# Patient Record
Sex: MALE | Race: WHITE | NOT HISPANIC OR LATINO | URBAN - METROPOLITAN AREA
[De-identification: names, ages, dates, MRNs, and addresses within clinical notes are randomized per-mention and may not be internally consistent; named-entity substitution may affect disease eponyms.]

---

## 2019-07-03 ENCOUNTER — EMERGENCY (EMERGENCY)
Facility: HOSPITAL | Age: 74
LOS: 1 days | Discharge: HOME | End: 2019-07-03
Attending: STUDENT IN AN ORGANIZED HEALTH CARE EDUCATION/TRAINING PROGRAM
Payer: MEDICARE

## 2019-07-03 VITALS
RESPIRATION RATE: 17 BRPM | TEMPERATURE: 98 F | WEIGHT: 195.99 LBS | DIASTOLIC BLOOD PRESSURE: 95 MMHG | OXYGEN SATURATION: 99 % | SYSTOLIC BLOOD PRESSURE: 173 MMHG | HEIGHT: 64 IN | HEART RATE: 51 BPM

## 2019-07-03 DIAGNOSIS — Z98.890 OTHER SPECIFIED POSTPROCEDURAL STATES: Chronic | ICD-10-CM

## 2019-07-03 LAB
ALBUMIN SERPL ELPH-MCNC: 4.8 G/DL — SIGNIFICANT CHANGE UP (ref 3.3–5)
ALP SERPL-CCNC: 74 U/L — SIGNIFICANT CHANGE UP (ref 40–120)
ALT FLD-CCNC: 12 U/L — SIGNIFICANT CHANGE UP (ref 10–45)
ANION GAP SERPL CALC-SCNC: 12 MMOL/L — SIGNIFICANT CHANGE UP (ref 5–17)
ANION GAP SERPL CALC-SCNC: 16 MMOL/L — SIGNIFICANT CHANGE UP (ref 5–17)
APPEARANCE UR: CLEAR — SIGNIFICANT CHANGE UP
AST SERPL-CCNC: 17 U/L — SIGNIFICANT CHANGE UP (ref 10–40)
BACTERIA # UR AUTO: NEGATIVE — SIGNIFICANT CHANGE UP
BASE EXCESS BLDV CALC-SCNC: -0.6 MMOL/L — SIGNIFICANT CHANGE UP (ref -2–2)
BASOPHILS # BLD AUTO: 0 K/UL — SIGNIFICANT CHANGE UP (ref 0–0.2)
BASOPHILS NFR BLD AUTO: 0.3 % — SIGNIFICANT CHANGE UP (ref 0–2)
BILIRUB SERPL-MCNC: 0.4 MG/DL — SIGNIFICANT CHANGE UP (ref 0.2–1.2)
BILIRUB UR-MCNC: NEGATIVE — SIGNIFICANT CHANGE UP
BUN SERPL-MCNC: 23 MG/DL — SIGNIFICANT CHANGE UP (ref 7–23)
BUN SERPL-MCNC: 26 MG/DL — HIGH (ref 7–23)
CA-I SERPL-SCNC: 1.2 MMOL/L — SIGNIFICANT CHANGE UP (ref 1.12–1.3)
CALCIUM SERPL-MCNC: 11 MG/DL — HIGH (ref 8.4–10.5)
CALCIUM SERPL-MCNC: 9.2 MG/DL — SIGNIFICANT CHANGE UP (ref 8.4–10.5)
CHLORIDE BLDV-SCNC: 101 MMOL/L — SIGNIFICANT CHANGE UP (ref 96–108)
CHLORIDE SERPL-SCNC: 94 MMOL/L — LOW (ref 96–108)
CHLORIDE SERPL-SCNC: 99 MMOL/L — SIGNIFICANT CHANGE UP (ref 96–108)
CO2 BLDV-SCNC: 26 MMOL/L — SIGNIFICANT CHANGE UP (ref 22–30)
CO2 SERPL-SCNC: 22 MMOL/L — SIGNIFICANT CHANGE UP (ref 22–31)
CO2 SERPL-SCNC: 26 MMOL/L — SIGNIFICANT CHANGE UP (ref 22–31)
COLOR SPEC: SIGNIFICANT CHANGE UP
CREAT SERPL-MCNC: 1.27 MG/DL — SIGNIFICANT CHANGE UP (ref 0.5–1.3)
CREAT SERPL-MCNC: 1.33 MG/DL — HIGH (ref 0.5–1.3)
DIFF PNL FLD: ABNORMAL
EOSINOPHIL # BLD AUTO: 0 K/UL — SIGNIFICANT CHANGE UP (ref 0–0.5)
EOSINOPHIL NFR BLD AUTO: 0.3 % — SIGNIFICANT CHANGE UP (ref 0–6)
EPI CELLS # UR: 0 /HPF — SIGNIFICANT CHANGE UP
GAS PNL BLDV: 132 MMOL/L — LOW (ref 135–145)
GAS PNL BLDV: SIGNIFICANT CHANGE UP
GLUCOSE BLDC GLUCOMTR-MCNC: 183 MG/DL — HIGH (ref 70–99)
GLUCOSE BLDV-MCNC: 257 MG/DL — HIGH (ref 70–99)
GLUCOSE SERPL-MCNC: 270 MG/DL — HIGH (ref 70–99)
GLUCOSE SERPL-MCNC: 429 MG/DL — HIGH (ref 70–99)
GLUCOSE UR QL: ABNORMAL
HCO3 BLDV-SCNC: 25 MMOL/L — SIGNIFICANT CHANGE UP (ref 21–29)
HCT VFR BLD CALC: 49.6 % — SIGNIFICANT CHANGE UP (ref 39–50)
HCT VFR BLDA CALC: 46 % — SIGNIFICANT CHANGE UP (ref 39–50)
HGB BLD CALC-MCNC: 14.9 G/DL — SIGNIFICANT CHANGE UP (ref 13–17)
HGB BLD-MCNC: 15.1 G/DL — SIGNIFICANT CHANGE UP (ref 13–17)
HYALINE CASTS # UR AUTO: 0 /LPF — SIGNIFICANT CHANGE UP (ref 0–2)
KETONES UR-MCNC: NEGATIVE — SIGNIFICANT CHANGE UP
LACTATE BLDV-MCNC: 2.4 MMOL/L — HIGH (ref 0.7–2)
LEUKOCYTE ESTERASE UR-ACNC: NEGATIVE — SIGNIFICANT CHANGE UP
LIDOCAIN IGE QN: 80 U/L — HIGH (ref 7–60)
LYMPHOCYTES # BLD AUTO: 0.9 K/UL — LOW (ref 1–3.3)
LYMPHOCYTES # BLD AUTO: 9.1 % — LOW (ref 13–44)
MCHC RBC-ENTMCNC: 26.2 PG — LOW (ref 27–34)
MCHC RBC-ENTMCNC: 30.4 GM/DL — LOW (ref 32–36)
MCV RBC AUTO: 86.2 FL — SIGNIFICANT CHANGE UP (ref 80–100)
MONOCYTES # BLD AUTO: 0.5 K/UL — SIGNIFICANT CHANGE UP (ref 0–0.9)
MONOCYTES NFR BLD AUTO: 4.9 % — SIGNIFICANT CHANGE UP (ref 2–14)
NEUTROPHILS # BLD AUTO: 8.6 K/UL — HIGH (ref 1.8–7.4)
NEUTROPHILS NFR BLD AUTO: 85.4 % — HIGH (ref 43–77)
NITRITE UR-MCNC: NEGATIVE — SIGNIFICANT CHANGE UP
PCO2 BLDV: 46 MMHG — SIGNIFICANT CHANGE UP (ref 35–50)
PH BLDV: 7.35 — SIGNIFICANT CHANGE UP (ref 7.35–7.45)
PH UR: 6.5 — SIGNIFICANT CHANGE UP (ref 5–8)
PLATELET # BLD AUTO: 210 K/UL — SIGNIFICANT CHANGE UP (ref 150–400)
PO2 BLDV: 29 MMHG — SIGNIFICANT CHANGE UP (ref 25–45)
POTASSIUM BLDV-SCNC: 5.2 MMOL/L — SIGNIFICANT CHANGE UP (ref 3.5–5.3)
POTASSIUM SERPL-MCNC: 5.1 MMOL/L — SIGNIFICANT CHANGE UP (ref 3.5–5.3)
POTASSIUM SERPL-MCNC: 5.6 MMOL/L — HIGH (ref 3.5–5.3)
POTASSIUM SERPL-SCNC: 5.1 MMOL/L — SIGNIFICANT CHANGE UP (ref 3.5–5.3)
POTASSIUM SERPL-SCNC: 5.6 MMOL/L — HIGH (ref 3.5–5.3)
PROT SERPL-MCNC: 8.1 G/DL — SIGNIFICANT CHANGE UP (ref 6–8.3)
PROT UR-MCNC: ABNORMAL
RBC # BLD: 5.76 M/UL — SIGNIFICANT CHANGE UP (ref 4.2–5.8)
RBC # FLD: 12.3 % — SIGNIFICANT CHANGE UP (ref 10.3–14.5)
RBC CASTS # UR COMP ASSIST: 1 /HPF — SIGNIFICANT CHANGE UP (ref 0–4)
SAO2 % BLDV: 50 % — LOW (ref 67–88)
SODIUM SERPL-SCNC: 133 MMOL/L — LOW (ref 135–145)
SODIUM SERPL-SCNC: 136 MMOL/L — SIGNIFICANT CHANGE UP (ref 135–145)
SP GR SPEC: 1.02 — SIGNIFICANT CHANGE UP (ref 1.01–1.02)
UROBILINOGEN FLD QL: NEGATIVE — SIGNIFICANT CHANGE UP
WBC # BLD: 10.1 K/UL — SIGNIFICANT CHANGE UP (ref 3.8–10.5)
WBC # FLD AUTO: 10.1 K/UL — SIGNIFICANT CHANGE UP (ref 3.8–10.5)
WBC UR QL: 0 /HPF — SIGNIFICANT CHANGE UP (ref 0–5)

## 2019-07-03 PROCEDURE — 74177 CT ABD & PELVIS W/CONTRAST: CPT | Mod: 26

## 2019-07-03 PROCEDURE — 99285 EMERGENCY DEPT VISIT HI MDM: CPT | Mod: GC

## 2019-07-03 PROCEDURE — 99220: CPT

## 2019-07-03 RX ORDER — ACETAMINOPHEN 500 MG
975 TABLET ORAL ONCE
Refills: 0 | Status: COMPLETED | OUTPATIENT
Start: 2019-07-03 | End: 2019-07-03

## 2019-07-03 RX ORDER — INSULIN GLARGINE 100 [IU]/ML
16 INJECTION, SOLUTION SUBCUTANEOUS AT BEDTIME
Refills: 0 | Status: DISCONTINUED | OUTPATIENT
Start: 2019-07-03 | End: 2019-07-07

## 2019-07-03 RX ORDER — SODIUM CHLORIDE 9 MG/ML
1000 INJECTION INTRAMUSCULAR; INTRAVENOUS; SUBCUTANEOUS
Refills: 0 | Status: DISCONTINUED | OUTPATIENT
Start: 2019-07-03 | End: 2019-07-07

## 2019-07-03 RX ORDER — SODIUM CHLORIDE 9 MG/ML
1000 INJECTION INTRAMUSCULAR; INTRAVENOUS; SUBCUTANEOUS ONCE
Refills: 0 | Status: COMPLETED | OUTPATIENT
Start: 2019-07-03 | End: 2019-07-03

## 2019-07-03 RX ORDER — ONDANSETRON 8 MG/1
4 TABLET, FILM COATED ORAL ONCE
Refills: 0 | Status: COMPLETED | OUTPATIENT
Start: 2019-07-03 | End: 2019-07-03

## 2019-07-03 RX ORDER — DEXTROSE 50 % IN WATER 50 %
25 SYRINGE (ML) INTRAVENOUS ONCE
Refills: 0 | Status: DISCONTINUED | OUTPATIENT
Start: 2019-07-03 | End: 2019-07-07

## 2019-07-03 RX ORDER — INSULIN LISPRO 100/ML
5 VIAL (ML) SUBCUTANEOUS
Refills: 0 | Status: DISCONTINUED | OUTPATIENT
Start: 2019-07-03 | End: 2019-07-07

## 2019-07-03 RX ORDER — ONDANSETRON 8 MG/1
4 TABLET, FILM COATED ORAL EVERY 6 HOURS
Refills: 0 | Status: DISCONTINUED | OUTPATIENT
Start: 2019-07-03 | End: 2019-07-07

## 2019-07-03 RX ORDER — INSULIN LISPRO 100/ML
VIAL (ML) SUBCUTANEOUS
Refills: 0 | Status: DISCONTINUED | OUTPATIENT
Start: 2019-07-03 | End: 2019-07-07

## 2019-07-03 RX ORDER — SODIUM CHLORIDE 9 MG/ML
1000 INJECTION, SOLUTION INTRAVENOUS
Refills: 0 | Status: DISCONTINUED | OUTPATIENT
Start: 2019-07-03 | End: 2019-07-07

## 2019-07-03 RX ORDER — GLUCAGON INJECTION, SOLUTION 0.5 MG/.1ML
1 INJECTION, SOLUTION SUBCUTANEOUS ONCE
Refills: 0 | Status: DISCONTINUED | OUTPATIENT
Start: 2019-07-03 | End: 2019-07-07

## 2019-07-03 RX ORDER — DEXTROSE 50 % IN WATER 50 %
12.5 SYRINGE (ML) INTRAVENOUS ONCE
Refills: 0 | Status: DISCONTINUED | OUTPATIENT
Start: 2019-07-03 | End: 2019-07-07

## 2019-07-03 RX ORDER — DEXTROSE 50 % IN WATER 50 %
15 SYRINGE (ML) INTRAVENOUS ONCE
Refills: 0 | Status: DISCONTINUED | OUTPATIENT
Start: 2019-07-03 | End: 2019-07-07

## 2019-07-03 RX ORDER — FENOFIBRATE,MICRONIZED 130 MG
145 CAPSULE ORAL DAILY
Refills: 0 | Status: DISCONTINUED | OUTPATIENT
Start: 2019-07-03 | End: 2019-07-07

## 2019-07-03 RX ORDER — ATORVASTATIN CALCIUM 80 MG/1
80 TABLET, FILM COATED ORAL AT BEDTIME
Refills: 0 | Status: DISCONTINUED | OUTPATIENT
Start: 2019-07-03 | End: 2019-07-07

## 2019-07-03 RX ORDER — INSULIN LISPRO 100/ML
VIAL (ML) SUBCUTANEOUS AT BEDTIME
Refills: 0 | Status: DISCONTINUED | OUTPATIENT
Start: 2019-07-03 | End: 2019-07-07

## 2019-07-03 RX ORDER — LISINOPRIL 2.5 MG/1
20 TABLET ORAL DAILY
Refills: 0 | Status: DISCONTINUED | OUTPATIENT
Start: 2019-07-03 | End: 2019-07-07

## 2019-07-03 RX ORDER — TAMSULOSIN HYDROCHLORIDE 0.4 MG/1
0.4 CAPSULE ORAL DAILY
Refills: 0 | Status: DISCONTINUED | OUTPATIENT
Start: 2019-07-03 | End: 2019-07-07

## 2019-07-03 RX ORDER — FAMOTIDINE 10 MG/ML
20 INJECTION INTRAVENOUS ONCE
Refills: 0 | Status: COMPLETED | OUTPATIENT
Start: 2019-07-03 | End: 2019-07-03

## 2019-07-03 RX ADMIN — Medication 2: at 18:16

## 2019-07-03 RX ADMIN — SODIUM CHLORIDE 1000 MILLILITER(S): 9 INJECTION INTRAMUSCULAR; INTRAVENOUS; SUBCUTANEOUS at 13:37

## 2019-07-03 RX ADMIN — LISINOPRIL 20 MILLIGRAM(S): 2.5 TABLET ORAL at 18:19

## 2019-07-03 RX ADMIN — ATORVASTATIN CALCIUM 80 MILLIGRAM(S): 80 TABLET, FILM COATED ORAL at 22:11

## 2019-07-03 RX ADMIN — ONDANSETRON 4 MILLIGRAM(S): 8 TABLET, FILM COATED ORAL at 10:56

## 2019-07-03 RX ADMIN — INSULIN GLARGINE 16 UNIT(S): 100 INJECTION, SOLUTION SUBCUTANEOUS at 22:10

## 2019-07-03 RX ADMIN — SODIUM CHLORIDE 100 MILLILITER(S): 9 INJECTION INTRAMUSCULAR; INTRAVENOUS; SUBCUTANEOUS at 17:22

## 2019-07-03 RX ADMIN — Medication 975 MILLIGRAM(S): at 10:56

## 2019-07-03 RX ADMIN — FAMOTIDINE 20 MILLIGRAM(S): 10 INJECTION INTRAVENOUS at 10:56

## 2019-07-03 RX ADMIN — Medication 5 UNIT(S): at 18:16

## 2019-07-03 RX ADMIN — TAMSULOSIN HYDROCHLORIDE 0.4 MILLIGRAM(S): 0.4 CAPSULE ORAL at 17:21

## 2019-07-03 RX ADMIN — SODIUM CHLORIDE 1000 MILLILITER(S): 9 INJECTION INTRAMUSCULAR; INTRAVENOUS; SUBCUTANEOUS at 10:55

## 2019-07-03 NOTE — ED CDU PROVIDER INITIAL DAY NOTE - OBJECTIVE STATEMENT
75yo M with PMH CAD s/p CABG, pancreatitis x 2 (last 2 months ago), HTN, HLD, DM (on metformin), PSH hernia repair with mesh, presenting with nausea and sensation that he has to have a BM since this morning. Pt reports he felt well yesterday, woke up at 2AM and had large BM, normal color but stool was harder than usual. Since then has felt like he has to have another BM but can not. pt also Reports had mid lower back pain, felt he couldn't get into a comfortable position on the sofa. No abdominal pain. + dysuria noted today. Did not eat or drink today. pt does not check glucose regularly. right now feels well.    Denies any fever/chills, CP, SOB, vomiting, melena, BRBPR, hematuria, numbness/tingling, weakness.

## 2019-07-03 NOTE — ED PROVIDER NOTE - ATTENDING CONTRIBUTION TO CARE
74 YOM with PMH CAD s/p CABG, pancreatitis x 2 (last 2 months ago), HTN, HLD, DM (on metformin), PSH hernia repair with mesh, presenting with sensation that he has to have a BM and some lower back pain since this morning. Woke up at 2AM and had large BM, normal color but stool was harder than usual. Since then has felt like he has to have another BM but can not. No abdominal pain. + dysuria noted today. Denies any fever/chills, CP, SOB, vomiting, melena, BRBPR, hematuria, numbness/tingling, weakness.    PE: HD stable, well appearing, s1s2 normal, lungs clear, abd soft obese ntnd, no midline spinal tenderness, no CVAT, no obvious groin hernia, no LE swelling.    AP: evaluate for obstruction given multiple surgeries, evaluate for intraabdominal pathology. lower concern for 74 YOM with PMH CAD s/p CABG, pancreatitis x 2 (last 2 months ago), HTN, HLD, DM (on metformin), PSH hernia repair with mesh, presenting with sensation that he has to have a BM and some lower back pain since this morning. Woke up at 2AM and had large BM, normal color but stool was harder than usual. Since then has felt like he has to have another BM but can not. No abdominal pain. + dysuria noted today. Denies any fever/chills, CP, SOB, vomiting, melena, BRBPR, hematuria, numbness/tingling, weakness.    PE: HD stable, well appearing, s1s2 normal, lungs clear, abd soft obese ntnd, no midline spinal tenderness, no CVAT, no obvious groin hernia, no LE swelling.    AP: evaluate for obstruction given multiple surgeries and renal colic, evaluate for intraabdominal pathology. fsg elevated, will r/o DKA. fluids to hydrate. dispo pending

## 2019-07-03 NOTE — ED PROVIDER NOTE - RECTAL
non-tender/chaperoned by ED suzanne Driscoll; + nontender hemorrhoids, minimal brown stool in rectum

## 2019-07-03 NOTE — ED CDU PROVIDER INITIAL DAY NOTE - ATTENDING CONTRIBUTION TO CARE
75yo M with PMH CAD s/p CABG, pancreatitis x 2 (last 2 months ago), HTN, HLD, DM (on metformin), PSH hernia repair with mesh, presenting with mild lower back pain and sensation to have BM. Patient found to have a 3mm kidney stone and hyperglycemia without AG. HD stable. admit to CDU for pain control, IVF and endocrine consult for management of hyperglycemia.

## 2019-07-03 NOTE — ED ADULT NURSE NOTE - OBJECTIVE STATEMENT
75 y/o male with pmhx of pancreatitis c/o no stool with increased urge to move bowels post bm associated with nausea x 0200 today.  per pt, he had a bm and then felt like he had to go again but nothing has come out and he feels discomfort bc of the urge.  pt also c/o burining upon urination but denies any hematuria or melena at this time.  pt is awake, alert and responsive to all stimuli.  no sob or respiratory distress noted.  vss.  safety precautions in place.  family at bedside.  will continue to monitor.

## 2019-07-03 NOTE — ED PROVIDER NOTE - OBJECTIVE STATEMENT
73yo M with PMH CAD s/p CABG, pancreatitis x 2 (last 2 months ago), HTN, HLD, DM (on metformin), PSH hernia repair with mesh, presenting with nausea and sensation that he has to have a BM since this morning. Pt reports he felt well yesterday, woke up at 2AM and had large BM, normal color but stool was harder than usual. Since then has felt like he has to have another BM but can not. Reports he does not think he has passed gas since. Reports mid lower back pain. No abdominal pain. + dysuria noted today. Reports his stools were hard when he had pancreatitis in the past, but also had severe abd pain at that time. Did not eat or drink today. Denies any 73yo M with PMH CAD s/p CABG, pancreatitis x 2 (last 2 months ago), HTN, HLD, DM (on metformin), PSH hernia repair with mesh, presenting with nausea and sensation that he has to have a BM since this morning. Pt reports he felt well yesterday, woke up at 2AM and had large BM, normal color but stool was harder than usual. Since then has felt like he has to have another BM but can not. Reports he does not think he has passed gas since. Reports mid lower back pain. No abdominal pain. + dysuria noted today. Reports his stools were hard when he had pancreatitis in the past, but also had severe abd pain at that time. Did not eat or drink today. Denies any fever/chills, CP, SOB, vomiting, melena, BRBPR, hematuria, numbness/tingling, weakness.

## 2019-07-03 NOTE — ED CDU PROVIDER INITIAL DAY NOTE - PMH
Acute pancreatitis, unspecified complication status, unspecified pancreatitis type    Coronary artery disease involving coronary bypass graft without angina pectoris, unspecified whether native or transplanted heart    Essential hypertension    Hypercholesterolemia

## 2019-07-03 NOTE — ED ADULT NURSE REASSESSMENT NOTE - NS ED NURSE REASSESS COMMENT FT1
18.00  Received the p from main ED for the management of Kidney stones & hyperglycemia  .Pt is A&OX 4 Pt is oriented to CDU & plan of care was discussed.  Pt denies CP  SOB  N/V/D fever chills Renal colic dizziness  has stable vitals Neuro mentation Intact .Safety & comfort measures maintained. IV site looks clean & dry  no signs of infiltration noted pt denies pain @ IV site  . Pt advised to call for help if needed Call bell  with in  the  reach. medicated as per order for  blood sugar . meals provided Pt verbalized the understanding  Will continue to monitor.
pt provided with po contrast for ct abdomen per md's orders.  will continue to monitor.
Pt received from JESUS Thurman. Pt oriented to CDU & plan of care was discussed. Pt A&O x 4. Pt in CDU for IVF, pain control, flomax, anti-emetic PRN, and endocrine consult. Pt denies any burning with urination, abdominal pain, flank pain, nausea, vomiting, frequency or urgency. Pt aware to use strainer. Pt denies any tenderness when palpating abdominal or flank area. Pt IV fluids running as per MAR. Safety & comfort measures maintained. Call bell in reach. Will continue to monitor.

## 2019-07-04 VITALS
OXYGEN SATURATION: 99 % | RESPIRATION RATE: 18 BRPM | SYSTOLIC BLOOD PRESSURE: 148 MMHG | HEART RATE: 92 BPM | DIASTOLIC BLOOD PRESSURE: 75 MMHG | TEMPERATURE: 98 F

## 2019-07-04 DIAGNOSIS — E78.00 PURE HYPERCHOLESTEROLEMIA, UNSPECIFIED: ICD-10-CM

## 2019-07-04 DIAGNOSIS — I10 ESSENTIAL (PRIMARY) HYPERTENSION: ICD-10-CM

## 2019-07-04 DIAGNOSIS — E11.65 TYPE 2 DIABETES MELLITUS WITH HYPERGLYCEMIA: ICD-10-CM

## 2019-07-04 LAB
ANION GAP SERPL CALC-SCNC: 11 MMOL/L — SIGNIFICANT CHANGE UP (ref 5–17)
BASOPHILS # BLD AUTO: 0 K/UL — SIGNIFICANT CHANGE UP (ref 0–0.2)
BASOPHILS NFR BLD AUTO: 0.5 % — SIGNIFICANT CHANGE UP (ref 0–2)
BUN SERPL-MCNC: 23 MG/DL — SIGNIFICANT CHANGE UP (ref 7–23)
CALCIUM SERPL-MCNC: 9.2 MG/DL — SIGNIFICANT CHANGE UP (ref 8.4–10.5)
CHLORIDE SERPL-SCNC: 102 MMOL/L — SIGNIFICANT CHANGE UP (ref 96–108)
CO2 SERPL-SCNC: 24 MMOL/L — SIGNIFICANT CHANGE UP (ref 22–31)
CREAT SERPL-MCNC: 1.19 MG/DL — SIGNIFICANT CHANGE UP (ref 0.5–1.3)
CULTURE RESULTS: SIGNIFICANT CHANGE UP
EOSINOPHIL # BLD AUTO: 0.2 K/UL — SIGNIFICANT CHANGE UP (ref 0–0.5)
EOSINOPHIL NFR BLD AUTO: 4.1 % — SIGNIFICANT CHANGE UP (ref 0–6)
GAS PNL BLDV: SIGNIFICANT CHANGE UP
GLUCOSE BLDC GLUCOMTR-MCNC: 222 MG/DL — HIGH (ref 70–99)
GLUCOSE SERPL-MCNC: 225 MG/DL — HIGH (ref 70–99)
HCT VFR BLD CALC: 39.7 % — SIGNIFICANT CHANGE UP (ref 39–50)
HGB BLD-MCNC: 14 G/DL — SIGNIFICANT CHANGE UP (ref 13–17)
LYMPHOCYTES # BLD AUTO: 1.5 K/UL — SIGNIFICANT CHANGE UP (ref 1–3.3)
LYMPHOCYTES # BLD AUTO: 27.9 % — SIGNIFICANT CHANGE UP (ref 13–44)
MCHC RBC-ENTMCNC: 30.4 PG — SIGNIFICANT CHANGE UP (ref 27–34)
MCHC RBC-ENTMCNC: 35.3 GM/DL — SIGNIFICANT CHANGE UP (ref 32–36)
MCV RBC AUTO: 86.3 FL — SIGNIFICANT CHANGE UP (ref 80–100)
MONOCYTES # BLD AUTO: 0.4 K/UL — SIGNIFICANT CHANGE UP (ref 0–0.9)
MONOCYTES NFR BLD AUTO: 7.2 % — SIGNIFICANT CHANGE UP (ref 2–14)
NEUTROPHILS # BLD AUTO: 3.2 K/UL — SIGNIFICANT CHANGE UP (ref 1.8–7.4)
NEUTROPHILS NFR BLD AUTO: 60.3 % — SIGNIFICANT CHANGE UP (ref 43–77)
PLATELET # BLD AUTO: 157 K/UL — SIGNIFICANT CHANGE UP (ref 150–400)
POTASSIUM SERPL-MCNC: 4.2 MMOL/L — SIGNIFICANT CHANGE UP (ref 3.5–5.3)
POTASSIUM SERPL-SCNC: 4.2 MMOL/L — SIGNIFICANT CHANGE UP (ref 3.5–5.3)
RBC # BLD: 4.6 M/UL — SIGNIFICANT CHANGE UP (ref 4.2–5.8)
RBC # FLD: 12.4 % — SIGNIFICANT CHANGE UP (ref 10.3–14.5)
SODIUM SERPL-SCNC: 137 MMOL/L — SIGNIFICANT CHANGE UP (ref 135–145)
SPECIMEN SOURCE: SIGNIFICANT CHANGE UP
WBC # BLD: 5.4 K/UL — SIGNIFICANT CHANGE UP (ref 3.8–10.5)
WBC # FLD AUTO: 5.4 K/UL — SIGNIFICANT CHANGE UP (ref 3.8–10.5)

## 2019-07-04 PROCEDURE — 82435 ASSAY OF BLOOD CHLORIDE: CPT

## 2019-07-04 PROCEDURE — 84132 ASSAY OF SERUM POTASSIUM: CPT

## 2019-07-04 PROCEDURE — 82962 GLUCOSE BLOOD TEST: CPT

## 2019-07-04 PROCEDURE — 87086 URINE CULTURE/COLONY COUNT: CPT

## 2019-07-04 PROCEDURE — 99284 EMERGENCY DEPT VISIT MOD MDM: CPT | Mod: 25

## 2019-07-04 PROCEDURE — 82947 ASSAY GLUCOSE BLOOD QUANT: CPT

## 2019-07-04 PROCEDURE — 96375 TX/PRO/DX INJ NEW DRUG ADDON: CPT

## 2019-07-04 PROCEDURE — 83036 HEMOGLOBIN GLYCOSYLATED A1C: CPT

## 2019-07-04 PROCEDURE — 80048 BASIC METABOLIC PNL TOTAL CA: CPT

## 2019-07-04 PROCEDURE — 82330 ASSAY OF CALCIUM: CPT

## 2019-07-04 PROCEDURE — 85027 COMPLETE CBC AUTOMATED: CPT

## 2019-07-04 PROCEDURE — 83690 ASSAY OF LIPASE: CPT

## 2019-07-04 PROCEDURE — G0378: CPT

## 2019-07-04 PROCEDURE — 74177 CT ABD & PELVIS W/CONTRAST: CPT

## 2019-07-04 PROCEDURE — 99217: CPT

## 2019-07-04 PROCEDURE — 85014 HEMATOCRIT: CPT

## 2019-07-04 PROCEDURE — 84295 ASSAY OF SERUM SODIUM: CPT

## 2019-07-04 PROCEDURE — 81001 URINALYSIS AUTO W/SCOPE: CPT

## 2019-07-04 PROCEDURE — 83605 ASSAY OF LACTIC ACID: CPT

## 2019-07-04 PROCEDURE — 96374 THER/PROPH/DIAG INJ IV PUSH: CPT | Mod: XU

## 2019-07-04 PROCEDURE — 80053 COMPREHEN METABOLIC PANEL: CPT

## 2019-07-04 PROCEDURE — 82803 BLOOD GASES ANY COMBINATION: CPT

## 2019-07-04 RX ORDER — OXYCODONE HYDROCHLORIDE 5 MG/1
1 TABLET ORAL
Qty: 6 | Refills: 0
Start: 2019-07-04 | End: 2019-07-05

## 2019-07-04 RX ORDER — ENOXAPARIN SODIUM 100 MG/ML
20 INJECTION SUBCUTANEOUS
Qty: 2 | Refills: 0
Start: 2019-07-04

## 2019-07-04 RX ORDER — METFORMIN HYDROCHLORIDE 850 MG/1
1 TABLET ORAL
Qty: 60 | Refills: 0
Start: 2019-07-04 | End: 2019-08-02

## 2019-07-04 RX ORDER — ONDANSETRON 8 MG/1
1 TABLET, FILM COATED ORAL
Qty: 10 | Refills: 0
Start: 2019-07-04 | End: 2019-07-06

## 2019-07-04 RX ORDER — TAMSULOSIN HYDROCHLORIDE 0.4 MG/1
1 CAPSULE ORAL
Qty: 14 | Refills: 0
Start: 2019-07-04

## 2019-07-04 RX ORDER — ROSUVASTATIN CALCIUM 5 MG/1
1 TABLET ORAL
Qty: 30 | Refills: 0
Start: 2019-07-04

## 2019-07-04 RX ADMIN — Medication 5 UNIT(S): at 08:41

## 2019-07-04 RX ADMIN — LISINOPRIL 20 MILLIGRAM(S): 2.5 TABLET ORAL at 08:43

## 2019-07-04 RX ADMIN — Medication 2: at 08:45

## 2019-07-04 RX ADMIN — Medication 145 MILLIGRAM(S): at 08:44

## 2019-07-04 NOTE — ED CDU PROVIDER DISPOSITION NOTE - CARE PROVIDER_API CALL
Kirk Medel (MD)  EndocrinologyMetabDiabetes; Internal Medicine  [address] [facility], [address] [place] [address]  Phone: [phone]  Fax: [phone]  Follow Up Time:

## 2019-07-04 NOTE — ED CDU PROVIDER SUBSEQUENT DAY NOTE - PROGRESS NOTE DETAILS
CDU progress note DANI Singh: Patient sleeping comfortably. NAD. VSS. will continue monitoring. Patient seen at bedside in NAD.  VSS.  Patient resting comfortably without complaints. Patient reports that he is feeling much better.  Denies pain, nausea/vomiting.  Awaiting endocrine recs. -Garfield Barksdale PA-C Patient seen at bedside in NAD.  VSS.  Patient resting comfortably without complaints. Endo consult recommending Metformin 1000 BID and Lantus 20units at bedtime.  Will DC with pain medications and urology and endocrinology follow up. Strict return precautions provided.  -Garfield Barksdale PA-C

## 2019-07-04 NOTE — CONSULT NOTE ADULT - SUBJECTIVE AND OBJECTIVE BOX
HPI:   75yo M with PMH CAD s/p CABG, pancreatitis x 2 (last 2 months ago), HTN, HLD, DM2 A1C 10.9 (on metformin), PSH hernia repair with mesh, presenting with nausea and sensation that he has to have a BM since this morning. Pt reports he felt well yesterday, woke up at 2AM and had large BM, normal color but stool was harder than usual. Since then has felt like he has to have another BM but can not. Reports he does not think he has passed gas since. Reports mid lower back pain. No abdominal pain. + dysuria noted today. Reports his stools were hard when he had pancreatitis in the past, but also had severe abd pain at that time. Did not eat or drink today. Denies any fever/chills, CP, SOB, vomiting, melena, BRBPR, hematuria, numbness/tingling, weakness    Endocrine History:  Patient with Type 2 DM for > 10 years. Only on metformin 500mg BID which patient reports being adherent to. Has a glucometer but reports to checking BG 1X every few weeks. Last time checked in AM and BG was 300s-400s in AM. Admits to recent polyuria, polydipsia. Reports diet is poor and eats a lot of bread and pasta. Drinks only water. Denies any retinopathy, but has not followed with optho recently. Denies neuropathy. Of note, patient reports had 2 episodes of pancreatitis past few months. Reports doctors never told him what was etiology. No recent med changes. Has never been on any other meds, including insulin for his DM.      PAST MEDICAL & SURGICAL HISTORY:  Hypercholesterolemia  Essential hypertension  Acute pancreatitis, unspecified complication status, unspecified pancreatitis type  Coronary artery disease involving coronary bypass graft without angina pectoris, unspecified whether native or transplanted heart  H/O hernia repair      FAMILY HISTORY:  No family hx of DM      Social History: denies smoking. social alcohol use    Outpatient Medications:  · 	metFORMIN 500 mg oral tablet, extended release: Last Dose Taken:  , 1 tab(s) orally 2 times a day  · 	ramipril 5 mg oral capsule: Last Dose Taken:  , 1 cap(s) orally once a day  · 	fenofibrate 160 mg oral tablet: Last Dose Taken:  , 1 tab(s) orally once a day  · 	rosuvastatin 20 mg oral tablet: Last Dose Taken:  , 1 tab(s) orally once a day  · 	Ecotrin Adult Low Strength 81 mg oral delayed release tablet: Last Dose Taken:  , 1 tab(s) orally once a day      MEDICATIONS  (STANDING):  atorvastatin 80 milliGRAM(s) Oral at bedtime  dextrose 5%. 1000 milliLiter(s) (50 mL/Hr) IV Continuous <Continuous>  dextrose 50% Injectable 12.5 Gram(s) IV Push once  dextrose 50% Injectable 25 Gram(s) IV Push once  dextrose 50% Injectable 25 Gram(s) IV Push once  fenofibrate Tablet 145 milliGRAM(s) Oral daily  insulin glargine Injectable (LANTUS) 16 Unit(s) SubCutaneous at bedtime  insulin lispro (HumaLOG) corrective regimen sliding scale   SubCutaneous three times a day before meals  insulin lispro (HumaLOG) corrective regimen sliding scale   SubCutaneous at bedtime  insulin lispro Injectable (HumaLOG) 5 Unit(s) SubCutaneous three times a day before meals  lisinopril 20 milliGRAM(s) Oral daily  sodium chloride 0.9%. 1000 milliLiter(s) (100 mL/Hr) IV Continuous <Continuous>  tamsulosin 0.4 milliGRAM(s) Oral daily    MEDICATIONS  (PRN):  dextrose 40% Gel 15 Gram(s) Oral once PRN Blood Glucose LESS THAN 70 milliGRAM(s)/deciliter  glucagon  Injectable 1 milliGRAM(s) IntraMuscular once PRN Glucose LESS THAN 70 milligrams/deciliter  ondansetron Injectable 4 milliGRAM(s) IV Push every 6 hours PRN Nausea and/or Vomiting      Allergies    aspirin (Rash)  phenothiazines (Hives)  Plavix (Hives)    Intolerances      Review of Systems:  Constitutional: No fever  Eyes: No blurry vision  Neuro: No tremors  HEENT: No pain  Cardiovascular: No chest pain, palpitations  Respiratory: No SOB, no cough  GI: No nausea, vomiting, +abdominal pain  : No dysuria  Skin: no rash  Endocrine: + polyuria, polydipsia  Hem/lymph: no swelling  Osteoporosis: no fractures    ALL OTHER SYSTEMS REVIEWED AND NEGATIVE      PHYSICAL EXAM:  VITALS: T(C): 36.8 (07-04-19 @ 08:06)  T(F): 98.3 (07-04-19 @ 08:06), Max: 98.3 (07-04-19 @ 08:06)  HR: 70 (07-04-19 @ 08:06) (70 - 76)  BP: 106/65 (07-04-19 @ 08:06) (106/65 - 142/75)  RR:  (18 - 18)  SpO2:  (94% - 98%)  Wt(kg): --  GENERAL: NAD, well-groomed, well-developed, +overweight  EYES: No proptosis, no lid lag, anicteric  HEENT:  Atraumatic, Normocephalic, moist mucous membranes  THYROID: Normal size, no palpable nodules  RESPIRATORY: Clear to auscultation bilaterally; No rales, rhonchi, wheezing  CARDIOVASCULAR: Regular rate and rhythm; No murmurs; no peripheral edema  GI: Soft, nontender, non distended, normal bowel sounds  SKIN: Dry, intact, No rashes or lesions  MUSCULOSKELETAL: Full range of motion, normal strength  NEURO: sensation intact, extraocular movements intact, no tremor  PSYCH: Alert and oriented x 3, reactive affect  CUSHING'S SIGNS: no striae    POCT Blood Glucose.: 222 mg/dL (07-04-19 @ 08:38)  POCT Blood Glucose.: 183 mg/dL (07-03-19 @ 22:09)  POCT Blood Glucose.: 214 mg/dL (07-03-19 @ 18:10)                            14.0   5.4   )-----------( 157      ( 04 Jul 2019 05:42 )             39.7       07-04    137  |  102  |  23  ----------------------------<  225<H>  4.2   |  24  |  1.19    EGFR if : 70  EGFR if non : 61    Ca    9.2      07-04    TPro  8.1  /  Alb  4.8  /  TBili  0.4  /  DBili  x   /  AST  17  /  ALT  12  /  AlkPhos  74  07-03        Hemoglobin A1C, Whole Blood: 10.9 % <H> [4.0 - 5.6] (07-03-19 @ 21:44) HPI:   73yo M with PMH CAD s/p CABG, pancreatitis x 2 (last 2 months ago), HTN, HLD, DM2 A1C 10.9 (on metformin), PSH hernia repair with mesh, presenting with nausea and sensation that he has to have a BM since this morning. Pt reports he felt well yesterday, woke up at 2AM and had large BM, normal color but stool was harder than usual. Since then has felt like he has to have another BM but can not. Reports he does not think he has passed gas since. Reports mid lower back pain. No abdominal pain. + dysuria noted today. Reports his stools were hard when he had pancreatitis in the past, but also had severe abd pain at that time. Did not eat or drink today. Denies any fever/chills, CP, SOB, vomiting, melena, BRBPR, hematuria, numbness/tingling, weakness    Endocrine History:  Patient with Type 2 DM for > 10 years. Only on metformin 500mg BID which patient reports being adherent to. Has a glucometer but reports to checking BG 1X every few weeks. Last time checked in AM and BG was 300s-400s in AM. Admits to recent polyuria, polydipsia. Reports diet is poor and eats a lot of bread and pasta. Drinks only water. Denies any retinopathy, but has not followed with optho recently. Denies neuropathy. Of note, patient reports had 2 episodes of pancreatitis past few months. Reports doctors never told him what was etiology. No recent med changes. Has never been on any other meds, including insulin for his DM.      PAST MEDICAL & SURGICAL HISTORY:  Hypercholesterolemia  Essential hypertension  Acute pancreatitis, unspecified complication status, unspecified pancreatitis type  Coronary artery disease involving coronary bypass graft without angina pectoris, unspecified whether native or transplanted heart  H/O hernia repair      FAMILY HISTORY:  No family hx of DM      Social History: denies smoking. social alcohol use    Outpatient Medications:  · 	metFORMIN 500 mg oral tablet, extended release: Last Dose Taken:  , 1 tab(s) orally 2 times a day  · 	ramipril 5 mg oral capsule: Last Dose Taken:  , 1 cap(s) orally once a day  · 	fenofibrate 160 mg oral tablet: Last Dose Taken:  , 1 tab(s) orally once a day  · 	rosuvastatin 20 mg oral tablet: Last Dose Taken:  , 1 tab(s) orally once a day  · 	Ecotrin Adult Low Strength 81 mg oral delayed release tablet: Last Dose Taken:  , 1 tab(s) orally once a day      MEDICATIONS  (STANDING):  atorvastatin 80 milliGRAM(s) Oral at bedtime  dextrose 5%. 1000 milliLiter(s) (50 mL/Hr) IV Continuous <Continuous>  dextrose 50% Injectable 12.5 Gram(s) IV Push once  dextrose 50% Injectable 25 Gram(s) IV Push once  dextrose 50% Injectable 25 Gram(s) IV Push once  fenofibrate Tablet 145 milliGRAM(s) Oral daily  insulin glargine Injectable (LANTUS) 16 Unit(s) SubCutaneous at bedtime  insulin lispro (HumaLOG) corrective regimen sliding scale   SubCutaneous three times a day before meals  insulin lispro (HumaLOG) corrective regimen sliding scale   SubCutaneous at bedtime  insulin lispro Injectable (HumaLOG) 5 Unit(s) SubCutaneous three times a day before meals  lisinopril 20 milliGRAM(s) Oral daily  sodium chloride 0.9%. 1000 milliLiter(s) (100 mL/Hr) IV Continuous <Continuous>  tamsulosin 0.4 milliGRAM(s) Oral daily    MEDICATIONS  (PRN):  dextrose 40% Gel 15 Gram(s) Oral once PRN Blood Glucose LESS THAN 70 milliGRAM(s)/deciliter  glucagon  Injectable 1 milliGRAM(s) IntraMuscular once PRN Glucose LESS THAN 70 milligrams/deciliter  ondansetron Injectable 4 milliGRAM(s) IV Push every 6 hours PRN Nausea and/or Vomiting      Allergies    aspirin (Rash)  phenothiazines (Hives)  Plavix (Hives)        Review of Systems:  Constitutional: No fever  Eyes: No blurry vision  Neuro: No tremors  HEENT: No pain  Cardiovascular: No chest pain, palpitations  Respiratory: No SOB, no cough  GI: No nausea, vomiting, +abdominal pain  : No dysuria  Skin: no rash  Endocrine: + polyuria, polydipsia  ALL OTHER SYSTEMS REVIEWED AND NEGATIVE      PHYSICAL EXAM:  VITALS: T(C): 36.8 (07-04-19 @ 08:06)  T(F): 98.3 (07-04-19 @ 08:06), Max: 98.3 (07-04-19 @ 08:06)  HR: 70 (07-04-19 @ 08:06) (70 - 76)  BP: 106/65 (07-04-19 @ 08:06) (106/65 - 142/75)  RR:  (18 - 18)  SpO2:  (94% - 98%)  Wt(kg): --  GENERAL: NAD, well-groomed, well-developed, +overweight  EYES: No proptosis, no lid lag, anicteric  HEENT:  Atraumatic, Normocephalic, moist mucous membranes  THYROID: Normal size, no palpable nodules  RESPIRATORY: Clear to auscultation bilaterally; No rales, rhonchi, wheezing  CARDIOVASCULAR: Regular rate and rhythm; No murmurs; no peripheral edema  GI: Soft, nontender, non distended, normal bowel sounds  SKIN: Dry, intact, No rashes or lesions on feet b/l other than intact callus  PSYCH: Alert and oriented x 3, reactive affect      POCT Blood Glucose.: 222 mg/dL (07-04-19 @ 08:38)  POCT Blood Glucose.: 183 mg/dL (07-03-19 @ 22:09)  POCT Blood Glucose.: 214 mg/dL (07-03-19 @ 18:10)                            14.0   5.4   )-----------( 157      ( 04 Jul 2019 05:42 )             39.7       07-04    137  |  102  |  23  ----------------------------<  225<H>  4.2   |  24  |  1.19    EGFR if : 70  EGFR if non : 61    Ca    9.2      07-04    TPro  8.1  /  Alb  4.8  /  TBili  0.4  /  DBili  x   /  AST  17  /  ALT  12  /  AlkPhos  74  07-03        Hemoglobin A1C, Whole Blood: 10.9 % <H> [4.0 - 5.6] (07-03-19 @ 21:44)

## 2019-07-04 NOTE — CONSULT NOTE ADULT - PROBLEM SELECTOR RECOMMENDATION 9
-Patient with uncontrolled Type 2 DM, A1C 10.9. Only on metformin 500mg BID at home.  -Presented with BG 400s, no AG. Of note, also had increased lactate of 3.9 on admission, now resolved s/p IVF. Started on weight based regimen of lantus 16 units and humalog 5 units TID with meals.   -With pt's recent hx of pancreatitis and unclear if pt with component of pancreatic insufficiency now as well, would recommend basal insulin + PO meds.  -Patient needs education by bedside nurse on insulin pen and glucometer use.  -RD consult  -Extensive discussion with pt on insulin pen use, adminstration, as well as necessary dietary changes.  -For dc can do lantus 20 units qhs and metformin 1000mg ER BID.  -Patient can f/u with endocrinology at [address] [place] [address] [phone]. -Patient with uncontrolled Type 2 DM, A1C 10.9. Only on metformin 500mg BID at home.  -Presented with BG 400s, no AG. Of note, also had increased lactate of 3.9 on admission, now resolved s/p IVF. Started on weight based regimen of lantus 16 units and humalog 5 units TID with meals.   -With pt's recent hx of pancreatitis and unclear if pt with component of pancreatic insufficiency now as well, would recommend basal insulin + PO meds.  -Patient needs education by bedside nurse on insulin pen and glucometer use.  -RD consult  -Extensive discussion with pt on insulin pen use, adminstration, as well as necessary dietary changes.  -For dc can do lantus 20 units qhs and metformin 1000mg ER BID.  -Patient can f/u with endocrinologist Dr. Kirk Medel near pt's home. can call [phone]  D/W CDU team

## 2019-07-04 NOTE — ED CDU PROVIDER SUBSEQUENT DAY NOTE - MEDICAL DECISION MAKING DETAILS
Edie: Patient with kidney stone with flank pain, pain controlled. also to see endocrine. will reassess.

## 2019-07-04 NOTE — ED CDU PROVIDER DISPOSITION NOTE - CLINICAL COURSE
73 y/o M with PMH CAD s/p CABG, pancreatitis, HTN, HLD, DM, PSH hernia repair with mesh, presenting with nausea and sensation that he has to have a BM since this morning. Pt reports he felt well yesterday, woke up at 2AM and had large BM, normal color but stool was harder than usual. Since then has felt like he has to have another BM but can not. pt also Reports had mid lower back pain, felt he couldn't get into a comfortable position on the sofa. No abdominal pain. + dysuria noted today. Did not eat or drink today. pt does not check glucose regularly. right now feels well.   In ED, K5.6, Cl 94, glu 429, lipase 89, lactate 3.9, other labs unremarkable. pt given IVFs. repeat K and Cl normal, glu decreased 270, lactate 2.4, Na 133, lipase normal. CT a/p showed mild L hydro with 3mm L distal ureter stone and bladder stone. pt sent to CDU for continued monitoring and treatment.   In CDU, endo c/s and recs given. pain resolved. 73 y/o M with PMH CAD s/p CABG, pancreatitis, HTN, HLD, DM, PSH hernia repair with mesh, presenting with nausea and sensation that he has to have a BM since this morning. Pt reports he felt well yesterday, woke up at 2AM and had large BM, normal color but stool was harder than usual. Since then has felt like he has to have another BM but can not. pt also Reports had mid lower back pain, felt he couldn't get into a comfortable position on the sofa. No abdominal pain. + dysuria noted today. Did not eat or drink today. pt does not check glucose regularly. right now feels well.   In ED, K5.6, Cl 94, glu 429, lipase 89, lactate 3.9, other labs unremarkable. pt given IVFs. repeat K and Cl normal, glu decreased 270, lactate 2.4, Na 133, lipase normal. CT a/p showed mild L hydro with 3mm L distal ureter stone and bladder stone. pt sent to CDU for continued monitoring and treatment.   In CDU, endo c/s and recs given. pain resolved.  Endocrine recommending lantus 20mgs at bedtime and metformin 1000mgs BID.  Patient's pain resolved and he will follow up with urology and endocrinology upon discharge.  Strict return precautions provided.

## 2019-07-04 NOTE — ED CDU PROVIDER SUBSEQUENT DAY NOTE - ATTENDING CONTRIBUTION TO CARE
I have personally performed a face to face diagnostic evaluation on this patient.  I have reviewed the ACP note and agree with the history, exam, and plan of care, except as noted.  History and Exam by me shows  Patient with flank pain, pain controlled. endocrine followed up on patient and recommend metformin and lantus. will d/c home.

## 2019-07-04 NOTE — ED CDU PROVIDER SUBSEQUENT DAY NOTE - HISTORY
No interval changes since initial CDU provider note. Pt sleeping comfortably. NAD, VSS. will continue FS monitoring, ISS, pain control prn, repeat labs in AM, and monitoring. - DANI Singh

## 2019-07-04 NOTE — ED CDU PROVIDER DISPOSITION NOTE - NSFOLLOWUPINSTRUCTIONS_ED_ALL_ED_FT
1.  Stay well hydrated  2.  Start taking Metformin 1000mgs every 12 hrs       Start taking Lantus 20 units at bedtime  3.  Take Tylenol 1000mgs every 6 hrs as needed for mild to moderate pain       Take Oxycodone 5mgs every 6 hrs as needed for severe pain       Take Flomax 0.4mgs daily at bedtime       Take Zofran 4mgs every 6 hrs as needed for nausea  4.  Follow up with Endocrinology, Dr. Medel upon discharge       Follow up with urology at the [facility] of urology [address] [place] [address] [phone]  5.  Return to the ER for worsening pain, fevers/chills, persistent nausea/vomiting or any other concerning symptoms

## 2019-07-04 NOTE — CONSULT NOTE ADULT - ATTENDING COMMENTS
Patient seen and examined. Agree with note as above with addendum: patient with poor glycemic control in the setting of recent pancreatitis. Based on his fibrate use, perhaps due to hyper-TG. If that it the case then he should be on some insulin to improve both his bs and TG. His atorvastatin should be 40mg or less to avoid drug-drug interaction between statin and fibrate. F/u with endo closer to home: Dr. Kirk Meedl, -[address] [facility] as the patient lives in [place].   Angella Estrada MD  Pager: [phone]  Nights and Weekends: [phone]

## 2019-07-04 NOTE — CONSULT NOTE ADULT - PROBLEM SELECTOR RECOMMENDATION 3
-pt on atorvastatin 80mg and fenofibrate 145mg. Can c/w current regimen but cautious that can increase risks of myopathy and rhabdo with combination therapy. Pt currently tolerating well.

## 2019-07-04 NOTE — CONSULT NOTE ADULT - ASSESSMENT
73yo M with PMH CAD s/p CABG, pancreatitis x 2 (last 2 months ago), HTN, HLD, DM2 A1C 10.9 (on metformin), PSH hernia repair with mesh, presenting with nausea and abd pain. Found with kidney stones. Endocrine consulted for uncontrolled Type 2 DM.

## 2019-07-04 NOTE — ED POST DISCHARGE NOTE - ADDITIONAL DOCUMENTATION
contacted by Dr. Estrada who would like to decrease patients rosuvastatin to 10mgs.  Contacted patient and made him aware of the change.  Instructed him to stop taking 20mg tabs and to start taking 10mg tabs.  new Prescription was sent and patient expressed understanding.  -Garfield Barksdale PA-C

## 2019-07-08 PROBLEM — E78.00 PURE HYPERCHOLESTEROLEMIA, UNSPECIFIED: Chronic | Status: ACTIVE | Noted: 2019-07-03

## 2019-07-08 PROBLEM — I10 ESSENTIAL (PRIMARY) HYPERTENSION: Chronic | Status: ACTIVE | Noted: 2019-07-03

## 2019-07-08 RX ORDER — INSULIN DETEMIR 100/ML (3)
20 INSULIN PEN (ML) SUBCUTANEOUS
Qty: 2 | Refills: 0
Start: 2019-07-08

## 2019-11-04 ENCOUNTER — APPOINTMENT (OUTPATIENT)
Dept: ENDOCRINOLOGY | Facility: CLINIC | Age: 74
End: 2019-11-04

## 2020-01-02 NOTE — ED PROVIDER NOTE - CROS ED CARDIOVAS ALL NEG
76 yo male with pmhx of CABG 11/2019, c/b pleural effusion s/p CT drainage in 12/2019 presenting with SOB and cough. States he is having exertional dyspnea for past 2-3 days with productive yellow cough. States that it feels similar to when he previously had the pleural effusion which got drained one month ago. Endorses good PO.    Denies sick contacts, fevers, CP, LOC
negative...

## 2020-01-06 ENCOUNTER — APPOINTMENT (OUTPATIENT)
Dept: UROLOGY | Facility: CLINIC | Age: 75
End: 2020-01-06
Payer: MEDICARE

## 2020-01-06 DIAGNOSIS — E78.5 HYPERLIPIDEMIA, UNSPECIFIED: ICD-10-CM

## 2020-01-06 DIAGNOSIS — I25.10 ATHEROSCLEROTIC HEART DISEASE OF NATIVE CORONARY ARTERY W/OUT ANGINA PECTORIS: ICD-10-CM

## 2020-01-06 DIAGNOSIS — Z00.00 ENCOUNTER FOR GENERAL ADULT MEDICAL EXAMINATION W/OUT ABNORMAL FINDINGS: ICD-10-CM

## 2020-01-06 PROCEDURE — 99204 OFFICE O/P NEW MOD 45 MIN: CPT | Mod: 25

## 2020-01-06 PROCEDURE — 51798 US URINE CAPACITY MEASURE: CPT

## 2020-01-06 NOTE — LETTER BODY
[FreeTextEntry1] : Reason for visit: Urinary frequency. Incontinence.\par \par This is a 74 year-old man with history of arteriosclerosis s/p triple bypass surgery presenting with urinary frequency and urgency referred for evaluation. Patient reports urinary frequency, urgency every 1-2 hours, and urinary incontinence. He reports undergoing urodynamic testing about a year ago that demonstrated overactive bladder but cannot recall the performing physician. He reports previously taking Myrbetriq 50 mg without any improvement. He denies any gross hematuria, dysuria. The patient does not smoke. His symptoms are aggravated by hydration. He has occasional nocturia. The patient denies any relieving factors. The patient denies any interference of function. The patient is entirely asymptomatic. He denies any history of glaucoma. All other review of systems are negative. He has no cancer in his family medical history. He has no previous surgical history. Past medical history, family history and social history were inquired and were noncontributory to current condition. The patient does not use tobacco or drink alcohol. Medications and allergies were reviewed. He has no known allergies to medication. He is taking Metformin.\par \par On examination, the patient is a healthy-appearing man in no acute distress. He is alert and oriented follows commands. He  has normal mood and affect. He is normocephalic. Neck is supple. Respirations are unlabored. Abdomen is soft and nontender. Bladder is nonpalpable. No CVA tenderness. Neurologically he is grossly intact. No peripheral edema. Skin without gross abnormality.\par \par Post-void residual on bladder scan today was 22 cc. \par \par ASSESSMENT:  Urinary frequency. Urinary incontinence.\par \par I counseled the patient. I discussed the various etiologies of urinary frequency and incontinence. I discussed the management options of the will to the patient. Given no improvement on Myrbetriq 50 mg, I recommend the patient repeat UDS testing w/ cystoscopy to further evaluate.  I discussed the potential side effects of the medication. I counseled the patient on its use and side effects. If the patient develops any side effects, the patient will discontinue the medication and contact me. Patient will also obtain a PSA profile today to further evaluate. Risks and alternatives were discussed. I answered the patient questions. The patient will follow-up as directed and will contact me with any questions or concerns. Thank you for the opportunity to participate in the care of Mr. EAGLE. I will keep you updated on his progress. \par \par PLAN: UDS w/ cystoscopy. PSA. Followup as directed.

## 2020-01-06 NOTE — ADDENDUM
[FreeTextEntry1] : Entered by Galo Grullon, acting as scribe for Dr. Mason Fuentes.\par \par The documentation recorded by the scribe accurately reflects the service I personally performed and the decisions made by me.

## 2020-01-06 NOTE — PHYSICAL EXAM
[General Appearance - Well Developed] : well developed [General Appearance - Well Nourished] : well nourished [Normal Appearance] : normal appearance [Well Groomed] : well groomed [General Appearance - In No Acute Distress] : no acute distress [Edema] : no peripheral edema [Respiration, Rhythm And Depth] : normal respiratory rhythm and effort [Exaggerated Use Of Accessory Muscles For Inspiration] : no accessory muscle use [Abdomen Soft] : soft [Abdomen Tenderness] : non-tender [Costovertebral Angle Tenderness] : no ~M costovertebral angle tenderness [Urethral Meatus] : meatus normal [Urinary Bladder Findings] : the bladder was normal on palpation [Testes Mass (___cm)] : there were no testicular masses [Scrotum] : the scrotum was normal [No Prostate Nodules] : no prostate nodules [] : no rash [Normal Station and Gait] : the gait and station were normal for the patient's age [No Focal Deficits] : no focal deficits [Oriented To Time, Place, And Person] : oriented to person, place, and time [Mood] : the mood was normal [Affect] : the affect was normal [No Palpable Adenopathy] : no palpable adenopathy [Not Anxious] : not anxious

## 2020-01-07 LAB
ANION GAP SERPL CALC-SCNC: 16 MMOL/L
APPEARANCE: CLEAR
BACTERIA: NEGATIVE
BILIRUBIN URINE: NEGATIVE
BLOOD URINE: NEGATIVE
BUN SERPL-MCNC: 19 MG/DL
CALCIUM SERPL-MCNC: 10.2 MG/DL
CHLORIDE SERPL-SCNC: 98 MMOL/L
CO2 SERPL-SCNC: 25 MMOL/L
COLOR: YELLOW
CREAT SERPL-MCNC: 0.98 MG/DL
GLUCOSE QUALITATIVE U: ABNORMAL
GLUCOSE SERPL-MCNC: 253 MG/DL
HYALINE CASTS: 0 /LPF
KETONES URINE: NEGATIVE
LEUKOCYTE ESTERASE URINE: NEGATIVE
MICROSCOPIC-UA: NORMAL
NITRITE URINE: NEGATIVE
PH URINE: 6.5
POTASSIUM SERPL-SCNC: 4.8 MMOL/L
PROTEIN URINE: NORMAL
PSA FREE FLD-MCNC: 39 %
PSA FREE SERPL-MCNC: 0.07 NG/ML
PSA SERPL-MCNC: 0.19 NG/ML
RED BLOOD CELLS URINE: 2 /HPF
SODIUM SERPL-SCNC: 139 MMOL/L
SPECIFIC GRAVITY URINE: 1.02
SQUAMOUS EPITHELIAL CELLS: 0 /HPF
UROBILINOGEN URINE: NORMAL
WHITE BLOOD CELLS URINE: 0 /HPF

## 2020-02-06 ENCOUNTER — OUTPATIENT (OUTPATIENT)
Dept: OUTPATIENT SERVICES | Facility: HOSPITAL | Age: 75
LOS: 1 days | End: 2020-02-06
Payer: MEDICARE

## 2020-02-06 ENCOUNTER — APPOINTMENT (OUTPATIENT)
Dept: UROLOGY | Facility: CLINIC | Age: 75
End: 2020-02-06
Payer: MEDICARE

## 2020-02-06 DIAGNOSIS — R35.0 FREQUENCY OF MICTURITION: ICD-10-CM

## 2020-02-06 DIAGNOSIS — N40.1 BENIGN PROSTATIC HYPERPLASIA WITH LOWER URINARY TRACT SYMPTOMS: ICD-10-CM

## 2020-02-06 DIAGNOSIS — Z98.890 OTHER SPECIFIED POSTPROCEDURAL STATES: Chronic | ICD-10-CM

## 2020-02-06 PROCEDURE — 52000 CYSTOURETHROSCOPY: CPT

## 2020-02-06 PROCEDURE — 51798 US URINE CAPACITY MEASURE: CPT | Mod: 59

## 2020-02-06 PROCEDURE — 51784 ANAL/URINARY MUSCLE STUDY: CPT | Mod: 26

## 2020-02-06 PROCEDURE — 51728 CYSTOMETROGRAM W/VP: CPT | Mod: 26

## 2020-02-06 PROCEDURE — 51741 ELECTRO-UROFLOWMETRY FIRST: CPT

## 2020-02-06 PROCEDURE — 51797 INTRAABDOMINAL PRESSURE TEST: CPT | Mod: 26

## 2020-02-06 PROCEDURE — 99213 OFFICE O/P EST LOW 20 MIN: CPT | Mod: 25

## 2020-02-06 PROCEDURE — 51797 INTRAABDOMINAL PRESSURE TEST: CPT

## 2020-02-06 PROCEDURE — 51784 ANAL/URINARY MUSCLE STUDY: CPT

## 2020-02-06 PROCEDURE — 51728 CYSTOMETROGRAM W/VP: CPT

## 2020-02-06 PROCEDURE — 51741 ELECTRO-UROFLOWMETRY FIRST: CPT | Mod: 26

## 2020-02-06 NOTE — ADDENDUM
[FreeTextEntry1] : Entered by Jareth Reeves, acting as scribe for Dr. Mason Fuentes.\par \par The documentation recorded by the scribe accurately reflects the service I personally performed and the decisions made by me.

## 2020-02-06 NOTE — LETTER BODY
[FreeTextEntry1] : Rick Henley MD\par [address]\par [place] [address]\par [phone]\par \par Dear Dr. Henley,\par \par Reason for visit: Urinary frequency. Incontinence.\par \par This is a 75 year-old man with history of arteriosclerosis s/p triple bypass surgery presenting with urinary frequency and urgency. He previously underwent urodynamic testing about a year ago that demonstrated overactive bladder but cannot recall the performing physician. He reports previously taking Myrbetriq 50 mg without any improvement in his symptoms. Patient returns today for urodynamics testing with cystoscopy. He is accompanied by his wife. Since his last visit, the patient reports he has considered Botox injections and inquires about the treatment. He denies any gross hematuria or dysuria. The past medical history, family history and social history are unchanged. All other review of systems are negative. Patient denies any changes in medications. Medication list was reconciled. \par \par On examination, the patient is an older-appearing man in no acute distress. He is alert and oriented follows commands. He has normal mood and affect. He is normocephalic. Neck is supple. Respirations are unlabored. Abdomen is soft and nontender. Bladder is nonpalpable. No CVA tenderness. Neurologically he is grossly intact. No peripheral edema. Skin without gross abnormality.\par \par His cystoscopy today demonstrated mild bilobar hypertrophy. His urodynamics testing demonstrated decreased bladder capacity, detrusor instability, and incomplete bladder emptying.\par \par ASSESSMENT: Urinary frequency. Urinary incontinence.\par \par I counseled the patient. I reassured him and his wife. His UDS test with cystoscopy today demonstrated evidence of an overactive bladder. Given no improvement on Myrbetriq 50 mg, I recommend the patient first begin a trial of Oxybutynin 10 mg. He denies any history of glaucoma. If medical therapy with Oxybutynin fails, then he may consider Botox injections with my colleague, Dr Devonte Russell. I discussed the potential side effects of the medication. I counseled the patient on its use and side effects. If the patient develops any side effects, the patient will discontinue the medication and contact me. Risks and alternatives were discussed. I answered the patient questions. The patient will follow-up as directed and will contact me with any questions or concerns. Thank you for the opportunity to participate in the care of Mr. EAGLE. I will keep you updated on his progress. \par \par PLAN: Trial of Oxybutynin 10 mg. Botox injections with Dr. Russell.

## 2020-03-02 ENCOUNTER — APPOINTMENT (OUTPATIENT)
Dept: UROLOGY | Facility: CLINIC | Age: 75
End: 2020-03-02
Payer: MEDICARE

## 2020-03-02 DIAGNOSIS — R35.1 NOCTURIA: ICD-10-CM

## 2020-03-02 PROCEDURE — 99214 OFFICE O/P EST MOD 30 MIN: CPT

## 2020-03-02 NOTE — HISTORY OF PRESENT ILLNESS
[FreeTextEntry1] : 75 y.o gentleman with OAB - wet, refractory to medications- currently on Oxybutynin ER 10 mg , tried and failed Myrbetriq 50 mg po in the past. Voids with irritative urinary ss of frequency and urgency q 1 h day time , nocturia X 3-4 times .Reports UUI - wears protective pull ups when travelling out and uses  depends, or  changing undergarments at home.  Fluids : 6  glasses of water, 2-3 cups of espresso.\par Options for management of the patient's overactive bladder and incontinence discussed at length. These included medical therapy, behavioral modification, bladder retraining, and surgical options. Surgical options reviewed included injection of botulinum toxin versus sacral nerve stimulation therapy. The patient has decided to move forward  with SNS.  The patient is aware of these risks and would like to move forward with the procedure.\par \par

## 2020-03-02 NOTE — PHYSICAL EXAM
[General Appearance - Well Developed] : well developed [General Appearance - Well Nourished] : well nourished [Normal Appearance] : normal appearance [Well Groomed] : well groomed [General Appearance - In No Acute Distress] : no acute distress [Abdomen Soft] : soft [Abdomen Tenderness] : non-tender [Costovertebral Angle Tenderness] : no ~M costovertebral angle tenderness [Edema] : no peripheral edema [] : no respiratory distress [Oriented To Time, Place, And Person] : oriented to person, place, and time [Exaggerated Use Of Accessory Muscles For Inspiration] : no accessory muscle use [Respiration, Rhythm And Depth] : normal respiratory rhythm and effort [Mood] : the mood was normal [Affect] : the affect was normal [Not Anxious] : not anxious [No Focal Deficits] : no focal deficits [Normal Station and Gait] : the gait and station were normal for the patient's age [No Palpable Adenopathy] : no palpable adenopathy

## 2020-03-02 NOTE — ASSESSMENT
[FreeTextEntry1] : \par Impression/Plan:75 y.o gentleman with OAB - wet, refractory to medications- currently on Oxybutynin ER 10 mg , tried and failed Myrbetriq 50 mg po in the past. Voids with irritative urinary ss of frequency and urgency q 1 h day time , nocturia X 3-4 times .Reports UUI - wears protective pull ups when travelling out and uses  depends, or  changing undergarments at home. Fluids : 6  glasses of water, 2-3 cups of espresso.\par 1.Options for management of the patient's overactive bladder and incontinence discussed at length. These included medical therapy, behavioral modification, bladder retraining, and surgical options. Surgical options reviewed included injection of botulinum toxin versus sacral nerve stimulation therapy. The patient has decided to move forward  with SNS.  The patient is aware of these risks and would like to move forward with the procedure.\par

## 2020-04-14 ENCOUNTER — APPOINTMENT (OUTPATIENT)
Dept: UROLOGY | Facility: AMBULATORY SURGERY CENTER | Age: 75
End: 2020-04-14

## 2020-04-21 ENCOUNTER — APPOINTMENT (OUTPATIENT)
Dept: UROLOGY | Facility: AMBULATORY SURGERY CENTER | Age: 75
End: 2020-04-21

## 2020-04-28 ENCOUNTER — RX RENEWAL (OUTPATIENT)
Age: 75
End: 2020-04-28

## 2020-05-30 ENCOUNTER — RX RENEWAL (OUTPATIENT)
Age: 75
End: 2020-05-30

## 2020-05-30 RX ORDER — OXYBUTYNIN CHLORIDE 10 MG/1
10 TABLET, EXTENDED RELEASE ORAL DAILY
Qty: 30 | Refills: 3 | Status: ACTIVE | COMMUNITY
Start: 2020-02-06 | End: 1900-01-01

## 2020-11-23 ENCOUNTER — APPOINTMENT (OUTPATIENT)
Dept: UROLOGY | Facility: CLINIC | Age: 75
End: 2020-11-23
Payer: MEDICARE

## 2020-11-23 VITALS
TEMPERATURE: 96.3 F | BODY MASS INDEX: 33.46 KG/M2 | HEART RATE: 65 BPM | HEIGHT: 64 IN | SYSTOLIC BLOOD PRESSURE: 131 MMHG | WEIGHT: 196 LBS | DIASTOLIC BLOOD PRESSURE: 75 MMHG

## 2020-11-23 DIAGNOSIS — N32.81 OVERACTIVE BLADDER: ICD-10-CM

## 2020-11-23 DIAGNOSIS — N39.41 URGE INCONTINENCE: ICD-10-CM

## 2020-11-23 DIAGNOSIS — N40.1 BENIGN PROSTATIC HYPERPLASIA WITH LOWER URINARY TRACT SYMPMS: ICD-10-CM

## 2020-11-23 DIAGNOSIS — E11.9 TYPE 2 DIABETES MELLITUS W/OUT COMPLICATIONS: ICD-10-CM

## 2020-11-23 PROCEDURE — 99215 OFFICE O/P EST HI 40 MIN: CPT

## 2020-11-23 NOTE — PHYSICAL EXAM
[General Appearance - Well Developed] : well developed [General Appearance - Well Nourished] : well nourished [Normal Appearance] : normal appearance [Well Groomed] : well groomed [General Appearance - In No Acute Distress] : no acute distress [Abdomen Soft] : soft [Abdomen Tenderness] : non-tender [Costovertebral Angle Tenderness] : no ~M costovertebral angle tenderness [Urinary Bladder Findings] : the bladder was normal on palpation [Edema] : no peripheral edema [] : no respiratory distress [Respiration, Rhythm And Depth] : normal respiratory rhythm and effort [Exaggerated Use Of Accessory Muscles For Inspiration] : no accessory muscle use [Affect] : the affect was normal [Mood] : the mood was normal [Not Anxious] : not anxious [Normal Station and Gait] : the gait and station were normal for the patient's age [No Focal Deficits] : no focal deficits [No Palpable Adenopathy] : no palpable adenopathy

## 2020-11-23 NOTE — ASSESSMENT
[FreeTextEntry1] : 75 year old M w OAB wet with UDS findings of no SADLER.  Diabetes without recent eval by endocrinology\par \par PLAN\par \par Hold all surgery until patient sees Endocrinology - sent message to Dr Felicitas Richardson\par \par We discussed once optimized from a diabetic standpoint, can consider SNM\par \par We discussed the r/b/a of Sacral Neuromodulation. The patient understands the procedure is done in 2 procedures or stages, and will only get the implanted battery if there is 50% improvement of the most bothersome symptom. The patient understands the non-rechargeable battery has an estimated life of 5-8 years, and the rechargeable battery has a life of 15 years but needs to be charged 1x/week for 20 minutes.  Risks including infection, bleeding, and rare allergy may lead to device removal. Need for revision surgery may be required.\par \par The patient understands that they won't be able to scuba dive with the device.\par \par RTC after endo eval\par

## 2020-11-23 NOTE — HISTORY OF PRESENT ILLNESS
[FreeTextEntry1] : 75 year old M w hx CAD, DM II (glucosuria Jan 2020), and severe urinary frequency and urgency incontinence. He trialed Myrbetriq 50 in past without success and oxybutynin.  He reports worsening urinary frequency q15 minutes and nocturia 3-4. He has severe urgency with leakage requiring pullups and depends.  He was scheduled for SNS in March 2020 but then COVID-19.\par \par Reports a strong normal urinary stream.  \par \par Denies constipation. \par \par RE his diabetes, he does not have an endocrinologist.  He does not check his blood sugars. He had glucosuria in Jan 2020.  Unsure of last HgBA1c\par \par Cysto (Feb 2020): mild BPH, 2 cm prostatic length\par UDS (Feb 2020): DO, capacity 253 ml, void qmax 10.4, pdet 33

## 2020-11-24 LAB
APPEARANCE: CLEAR
BACTERIA: NEGATIVE
BILIRUBIN URINE: NEGATIVE
BLOOD URINE: NEGATIVE
COLOR: YELLOW
GLUCOSE QUALITATIVE U: ABNORMAL
HYALINE CASTS: 1 /LPF
KETONES URINE: NEGATIVE
LEUKOCYTE ESTERASE URINE: NEGATIVE
MICROSCOPIC-UA: NORMAL
NITRITE URINE: NEGATIVE
PH URINE: 7.5
PROTEIN URINE: NORMAL
RED BLOOD CELLS URINE: 2 /HPF
SPECIFIC GRAVITY URINE: 1.02
SQUAMOUS EPITHELIAL CELLS: 1 /HPF
UROBILINOGEN URINE: NORMAL
WHITE BLOOD CELLS URINE: 0 /HPF

## 2020-11-25 LAB — BACTERIA UR CULT: NORMAL

## 2021-03-03 ENCOUNTER — APPOINTMENT (OUTPATIENT)
Dept: CARDIOLOGY | Facility: CLINIC | Age: 76
End: 2021-03-03

## 2021-03-06 ENCOUNTER — APPOINTMENT (OUTPATIENT)
Dept: DISASTER EMERGENCY | Facility: CLINIC | Age: 76
End: 2021-03-06

## 2021-03-09 ENCOUNTER — APPOINTMENT (OUTPATIENT)
Dept: UROLOGY | Facility: AMBULATORY SURGERY CENTER | Age: 76
End: 2021-03-09

## 2021-03-13 ENCOUNTER — APPOINTMENT (OUTPATIENT)
Dept: DISASTER EMERGENCY | Facility: CLINIC | Age: 76
End: 2021-03-13

## 2021-03-16 ENCOUNTER — APPOINTMENT (OUTPATIENT)
Dept: UROLOGY | Facility: AMBULATORY SURGERY CENTER | Age: 76
End: 2021-03-16

## 2021-12-03 NOTE — ED POST DISCHARGE NOTE - DETAILS
7/8/19: pt called stating lantus was too expensive. called pharm and lantus was $100 therefore changed Rx to levemir which cost $47 and made pt aware of above -Ruby Stevenson PA-C stretcher

## 2022-08-22 ENCOUNTER — OUTPATIENT (OUTPATIENT)
Dept: OUTPATIENT SERVICES | Facility: HOSPITAL | Age: 77
LOS: 1 days | End: 2022-08-22
Payer: MEDICARE

## 2022-08-22 ENCOUNTER — OUTPATIENT (OUTPATIENT)
Dept: OUTPATIENT SERVICES | Facility: HOSPITAL | Age: 77
LOS: 1 days | End: 2022-08-22

## 2022-08-22 VITALS
TEMPERATURE: 98 F | HEIGHT: 64 IN | HEART RATE: 85 BPM | DIASTOLIC BLOOD PRESSURE: 72 MMHG | RESPIRATION RATE: 17 BRPM | OXYGEN SATURATION: 97 % | SYSTOLIC BLOOD PRESSURE: 150 MMHG | WEIGHT: 186.07 LBS

## 2022-08-22 DIAGNOSIS — Z98.890 OTHER SPECIFIED POSTPROCEDURAL STATES: Chronic | ICD-10-CM

## 2022-08-22 DIAGNOSIS — Z95.1 PRESENCE OF AORTOCORONARY BYPASS GRAFT: Chronic | ICD-10-CM

## 2022-08-22 DIAGNOSIS — Z95.5 PRESENCE OF CORONARY ANGIOPLASTY IMPLANT AND GRAFT: Chronic | ICD-10-CM

## 2022-08-22 DIAGNOSIS — M20.12 HALLUX VALGUS (ACQUIRED), LEFT FOOT: ICD-10-CM

## 2022-08-22 DIAGNOSIS — Z11.52 ENCOUNTER FOR SCREENING FOR COVID-19: ICD-10-CM

## 2022-08-22 DIAGNOSIS — E11.9 TYPE 2 DIABETES MELLITUS WITHOUT COMPLICATIONS: ICD-10-CM

## 2022-08-22 DIAGNOSIS — Z01.818 ENCOUNTER FOR OTHER PREPROCEDURAL EXAMINATION: ICD-10-CM

## 2022-08-22 LAB — SARS-COV-2 RNA SPEC QL NAA+PROBE: SIGNIFICANT CHANGE UP

## 2022-08-22 PROCEDURE — C9803: CPT

## 2022-08-22 PROCEDURE — U0005: CPT

## 2022-08-22 PROCEDURE — 85027 COMPLETE CBC AUTOMATED: CPT

## 2022-08-22 PROCEDURE — 36415 COLL VENOUS BLD VENIPUNCTURE: CPT

## 2022-08-22 PROCEDURE — 83036 HEMOGLOBIN GLYCOSYLATED A1C: CPT

## 2022-08-22 PROCEDURE — 80048 BASIC METABOLIC PNL TOTAL CA: CPT

## 2022-08-22 PROCEDURE — G0463: CPT

## 2022-08-22 PROCEDURE — U0003: CPT

## 2022-08-22 RX ORDER — METFORMIN HYDROCHLORIDE 850 MG/1
1 TABLET ORAL
Qty: 0 | Refills: 0 | DISCHARGE

## 2022-08-22 RX ORDER — SODIUM CHLORIDE 9 MG/ML
3 INJECTION INTRAMUSCULAR; INTRAVENOUS; SUBCUTANEOUS EVERY 8 HOURS
Refills: 0 | Status: DISCONTINUED | OUTPATIENT
Start: 2022-08-25 | End: 2022-09-08

## 2022-08-22 RX ORDER — ROSUVASTATIN CALCIUM 5 MG/1
1 TABLET ORAL
Qty: 0 | Refills: 0 | DISCHARGE

## 2022-08-22 RX ORDER — ASPIRIN/CALCIUM CARB/MAGNESIUM 324 MG
1 TABLET ORAL
Qty: 0 | Refills: 0 | DISCHARGE

## 2022-08-22 RX ORDER — RAMIPRIL 5 MG
1 CAPSULE ORAL
Qty: 0 | Refills: 0 | DISCHARGE

## 2022-08-22 RX ORDER — FENOFIBRATE,MICRONIZED 130 MG
1 CAPSULE ORAL
Qty: 0 | Refills: 0 | DISCHARGE

## 2022-08-22 NOTE — H&P PST ADULT - MUSCULOSKELETAL
details… normal/ROM intact/normal gait/strength 5/5 bilateral upper extremities/strength 5/5 bilateral lower extremities/back exam/extremities exam

## 2022-08-22 NOTE — H&P PST ADULT - NSICDXPASTSURGICALHX_GEN_ALL_CORE_FT
PAST SURGICAL HISTORY:  H/O hernia repair bilateral    History of bunionectomy of right great toe 4 years a go    S/P CABG (coronary artery bypass graft) x 3  15 years ago    S/P coronary artery stent placement x 4    20  years ago    S/P LASIK surgery

## 2022-08-22 NOTE — H&P PST ADULT - NSICDXPASTMEDICALHX_GEN_ALL_CORE_FT
PAST MEDICAL HISTORY:  CAD (coronary artery disease)     Essential hypertension     History of pancreatitis years ago    [place] (hard of hearing)     Hypercholesterolemia     Type 2 diabetes mellitus

## 2022-08-22 NOTE — H&P PST ADULT - FALL HARM RISK - UNIVERSAL INTERVENTIONS
Bed in lowest position, wheels locked, appropriate side rails in place/Call bell, personal items and telephone in reach/Instruct patient to call for assistance before getting out of bed or chair/Non-slip footwear when patient is out of bed/[place] to call system/Purposeful Proactive Rounding/Room/bathroom lighting operational, light cord in reach

## 2022-08-22 NOTE — H&P PST ADULT - HISTORY OF PRESENT ILLNESS
77 yr old male with hx of CAD, Hypertension, Diabetes Mellitus type2, Bunions. Has left foot pain work up need bunion surgery.    **Poor historian did not have complete med list or MD name to call back with  info.    **Diabetes Mellitus  Metformin last  8/24/22 am   fs on arrival    COVID  denies s/s  denies infection  denies known exposure  swab 8/22  vaccine Moderna 7/23/21 8/20/21

## 2022-08-24 ENCOUNTER — TRANSCRIPTION ENCOUNTER (OUTPATIENT)
Age: 77
End: 2022-08-24

## 2022-08-25 ENCOUNTER — TRANSCRIPTION ENCOUNTER (OUTPATIENT)
Age: 77
End: 2022-08-25

## 2022-08-25 ENCOUNTER — OUTPATIENT (OUTPATIENT)
Dept: OUTPATIENT SERVICES | Facility: HOSPITAL | Age: 77
LOS: 1 days | End: 2022-08-25
Payer: MEDICARE

## 2022-08-25 VITALS
DIASTOLIC BLOOD PRESSURE: 77 MMHG | TEMPERATURE: 97 F | HEIGHT: 64 IN | WEIGHT: 186.07 LBS | RESPIRATION RATE: 16 BRPM | HEART RATE: 75 BPM | SYSTOLIC BLOOD PRESSURE: 146 MMHG

## 2022-08-25 VITALS
SYSTOLIC BLOOD PRESSURE: 140 MMHG | DIASTOLIC BLOOD PRESSURE: 71 MMHG | RESPIRATION RATE: 17 BRPM | OXYGEN SATURATION: 96 % | HEART RATE: 55 BPM

## 2022-08-25 DIAGNOSIS — Z95.1 PRESENCE OF AORTOCORONARY BYPASS GRAFT: Chronic | ICD-10-CM

## 2022-08-25 DIAGNOSIS — M20.12 HALLUX VALGUS (ACQUIRED), LEFT FOOT: ICD-10-CM

## 2022-08-25 DIAGNOSIS — Z98.890 OTHER SPECIFIED POSTPROCEDURAL STATES: Chronic | ICD-10-CM

## 2022-08-25 DIAGNOSIS — Z95.5 PRESENCE OF CORONARY ANGIOPLASTY IMPLANT AND GRAFT: Chronic | ICD-10-CM

## 2022-08-25 LAB — GLUCOSE BLDC GLUCOMTR-MCNC: 162 MG/DL — HIGH (ref 70–99)

## 2022-08-25 PROCEDURE — 28308 INCISION OF METATARSAL: CPT | Mod: LT

## 2022-08-25 PROCEDURE — 76000 FLUOROSCOPY <1 HR PHYS/QHP: CPT

## 2022-08-25 PROCEDURE — 82962 GLUCOSE BLOOD TEST: CPT

## 2022-08-25 RX ORDER — ONDANSETRON 8 MG/1
4 TABLET, FILM COATED ORAL ONCE
Refills: 0 | Status: DISCONTINUED | OUTPATIENT
Start: 2022-08-25 | End: 2022-09-08

## 2022-08-25 RX ORDER — LIDOCAINE HCL 20 MG/ML
0.2 VIAL (ML) INJECTION ONCE
Refills: 0 | Status: COMPLETED | OUTPATIENT
Start: 2022-08-25 | End: 2022-08-25

## 2022-08-25 RX ORDER — SODIUM CHLORIDE 9 MG/ML
1000 INJECTION, SOLUTION INTRAVENOUS
Refills: 0 | Status: DISCONTINUED | OUTPATIENT
Start: 2022-08-25 | End: 2022-09-08

## 2022-08-25 RX ORDER — FENTANYL CITRATE 50 UG/ML
25 INJECTION INTRAVENOUS
Refills: 0 | Status: DISCONTINUED | OUTPATIENT
Start: 2022-08-25 | End: 2022-08-25

## 2022-08-25 RX ORDER — CHLORHEXIDINE GLUCONATE 213 G/1000ML
1 SOLUTION TOPICAL ONCE
Refills: 0 | Status: COMPLETED | OUTPATIENT
Start: 2022-08-25 | End: 2022-08-25

## 2022-08-25 RX ORDER — CEFAZOLIN SODIUM 1 G
2000 VIAL (EA) INJECTION ONCE
Refills: 0 | Status: COMPLETED | OUTPATIENT
Start: 2022-08-25 | End: 2022-08-25

## 2022-08-25 RX ADMIN — SODIUM CHLORIDE 3 MILLILITER(S): 9 INJECTION INTRAMUSCULAR; INTRAVENOUS; SUBCUTANEOUS at 13:05

## 2022-08-25 RX ADMIN — CHLORHEXIDINE GLUCONATE 1 APPLICATION(S): 213 SOLUTION TOPICAL at 13:04

## 2022-08-25 NOTE — ASU PATIENT PROFILE, ADULT - NSICDXPASTMEDICALHX_GEN_ALL_CORE_FT
PAST MEDICAL HISTORY:  CAD (coronary artery disease)     Essential hypertension     History of pancreatitis years ago    [place] (hard of hearing)     Hypercholesterolemia     Type 2 diabetes mellitus

## 2022-08-25 NOTE — ASU DISCHARGE PLAN (ADULT/PEDIATRIC) - CARE PROVIDER_API CALL
Mason Henson (DPM)  Surgery  [address] [facility]  [place] [address]  Phone: [phone]  Fax: [phone]  Follow Up Time:

## 2022-08-25 NOTE — ASU DISCHARGE PLAN (ADULT/PEDIATRIC) - ASU DC SPECIAL INSTRUCTIONSFT
keep dressing clean dry and intact  weightbearing as tolerated in a surgical shoe  follow up in 1 week

## 2022-08-25 NOTE — ASU DISCHARGE PLAN (ADULT/PEDIATRIC) - CARE COORDINATION DISCHARGE PLANNING
Progress Note      Patient Name: Jada Butler   Patient ID: [id number]   Sex: Female   YOB: 1953    Primary Care Provider: Ellis Mendoza MD   Referring Provider: Ellis Mendoza MD    Visit Date: July 14, 2020    Provider: Samira Barnett PA-C   Location: [facility]   Location Address: [address] [place] [address]  [id number]   Location Phone: [phone]          Chief Complaint  · Follow-up     post op visit       History Of Present Illness  The patient is a 66 year old /White female who is in the office for followup appointment.      Here for first post op visit s/p right L5/S1 MID on 6/26/20 by Dr. Cueva.  Right leg pain has not improved since surgery.  Pain mostly posterior thigh and above the knee.  Pain is constant.  She's a diabetic on metformin only.       Past Medical History  Allergic rhinitis; Anemia, Unspecified; Arthritis; Degeneration of lumbar intervertebral disc; Diabetes; Diabetes Mellitus, Type II; Displacement of lumbar intervertebral disc; GERD; History of arthroscopy of left knee; Hyperlipemia; Hyperlipidemia; Hypertension; Impaired fasting glucose; Insomnia; Lateral meniscus tear, current, left; Left Knee Osteoarthritis ; Low back pain; Lower abdominal pain; Lumbago/low back pain; Lumbar Spinal Stenosis; Microscopic hematuria; Mood swings; Radiculopathy, lumbosacral; Reflux; Seasonal allergies; Ulcer         Past Surgical History  Artificial Joints/Limbs; breast reduction; Colonoscopy; Cystoscopy; Hernia; Hernia Repair; Hysterectomy; Joint Surgery; Lumbar laminectomy; Metal implants; Minimally invasive Discectomy; Sinus Surgery; Tubal ligation; Wrist Surgery         Medication List  Claritin oral; cyclobenzaprine 10 mg oral tablet; dicyclomine 10 mg oral capsule; dicyclomine 20 mg oral tablet; fluoxetine 20 mg oral capsule; glimepiride 2 mg oral tablet; losartan 100 mg oral tablet; metformin 500 mg oral tablet extended release 24 hr;  omeprazole 40 mg oral capsule,delayed release(DR/EC); Percocet 5-325 mg oral tablet; pravastatin 20 mg oral tablet; Prilosec oral; propranolol 80 mg oral capsule,extended release 24 hr; Prozac 20 mg oral capsule; Vitamin D2 50,000 unit oral capsule         Allergy List  Augmentin; Mobic       Allergies Reconciled  Family Medical History  Stroke; Heart Disease; Cancer, Unspecified; Diabetes, unspecified type; Family history of certain chronic disabling diseases; arthritis; Family history of Arthritis         Reproductive History   0 Para 0 0 0 0 & Postmenopausal       Social History  Alcohol Use (Current some day); Homemaker.; lives with spouse; .; Recreational Drug Use (Never); Retired.; Tobacco (Former); Unemployed.         Review of Systems  · Constitutional  o Denies  o : chills, excessive sweating, fatigue, fever, sycope/passing out, weight gain, weight loss  · Eyes  o Denies  o : changes in vision, blurry vision, double vision  · HENT  o Denies  o : loss of hearing, ringing in the ears, ear aches, sore throat, nasal congestion, sinus pain, nose bleeds, seasonal allergies  · Cardiovascular  o Denies  o : blood clots, swollen legs, anemia, easy burising or bleeding, transfusions  · Respiratory  o Denies  o : shortness of breath, dry cough, productive cough, pneumonia, COPD  · Gastrointestinal  o Denies  o : difficulty swallowing, reflux  · Genitourinary  o Denies  o : incontinence  · Neurologic  o Denies  o : headache, seizure, stroke, tremor, loss of balance, falls, dizziness/vertigo, difficulty with sleep, numbness/tingling/paresthesia , difficulty with coordination, difficulty with dexterity, weakness  · Musculoskeletal  o Admits  o : low back pain, right leg pain  o Denies  o : neck stiffness/pain, swollen lymph nodes, muscle aches, joint pain, weakness, spasms, pain radiating in arm  · Endocrine  o Denies  o : diabetes, thyroid disorder  · Psychiatric  o Denies  o : anxiety, depression  · All  "Others Negative      Vitals  Date Time BP Position Site L\R Cuff Size HR RR TEMP (F) WT  HT  BMI kg/m2 BSA m2 O2 Sat        07/14/2020 03:50 PM        97.6 173lbs 0oz 5'  3\" 30.65 1.87           Physical Examination  · Constitutional  o Appearance  o : well-nourished, well developed, alert, in no acute distress  · Respiratory  o Respiratory Effort  o : breathing unlabored  · Cardiovascular  o Peripheral Vascular System  o :   § Extremities  § : no cyanosis, clubbing or edema  · Neurologic  o Mental Status Examination  o :   § Orientation  § : grossly oriented to person, place and time  o Motor Examination  o :   § RLE Strength  § : strength normal  § RLE Motor Function  § : tone normal, no atrophy, no abnormal movements noted  § LLE Strength  § : strength normal  § LLE Motor Function  § : tone normal, no atrophy, no abnormal movements noted  o Sensation  o :   § Light Touch  § : sensation intact to light touch in extremities  o Gait and Station  o :   § Gait Screening  § : gait within normal limits  · Psychiatric  o Mood and Affect  o : mood normal, affect appropriate     lumbar incision has healed and no signs of infection           Assessment  · Degeneration of lumbar intervertebral disc     722.52/M51.36  · Lumbago/low back pain     724.3/M54.40  · Lumbar Spinal Stenosis     724.02/M48.06  · Lumbosacral disc herniation, L5-S1     722.10/M51.27    Problems Reconciled  Plan  · Medications  o prednisone 20 mg oral tablet   SIG: take 1 tablet (20 mg) by oral route twice a day for 5 days then one po qd x 3 days   DISP: (13) tablets with 0 refills  Prescribed on 07/14/2020     o Medications have been Reconciled  o Transition of Care or Provider Policy  · Instructions  o The ROS and the PFSH were reviewed at today's visit.  o Call or return to office if symptoms worsen or persist.   o I will start her on prednisone and f/u in two weeks. If not improving in one week call and I will consider ordering post op MRI. "   · Associate Tasks  o Task ID [id number] Rx Request: pain medication refill request            Electronically Signed by: Samira Barnett PA-C -Author on July 14, 2020 04:27:13 PM   No

## 2022-08-25 NOTE — ASU DISCHARGE PLAN (ADULT/PEDIATRIC) - NS MD DC FALL RISK RISK
For information on Fall & Injury Prevention, visit: [email or web address] OR  [email or web address] OR  https://www.cdc.gov/steadi/patient.html

## 2023-01-19 NOTE — ED CDU PROVIDER INITIAL DAY NOTE - DETAILS
BMI Counseling: Body mass index is 32 19 kg/m²  The BMI is above normal  Nutrition recommendations include reducing portion sizes  frequent reeval, vitals q 4hrs, IVF, pain control, flomax, anti-emetic PRN, ISS, endocrine consult  d/w attending

## 2023-02-22 NOTE — H&P PST ADULT - EXTREMITIES EXAM
normal/no clubbing/no cyanosis Staging Info: By selecting yes to the question above you will include information on AJCC 8 tumor staging in your Mohs note. Information on tumor staging will be automatically added for SCCs on the head and neck. AJCC 8 includes tumor size, tumor depth, perineural involvement and bone invasion.

## 2023-02-27 ENCOUNTER — INPATIENT (INPATIENT)
Facility: HOSPITAL | Age: 78
LOS: 6 days | Discharge: NURSING, REHAB OR LONG-TERM CARE | DRG: 418 | End: 2023-03-06
Attending: STUDENT IN AN ORGANIZED HEALTH CARE EDUCATION/TRAINING PROGRAM | Admitting: STUDENT IN AN ORGANIZED HEALTH CARE EDUCATION/TRAINING PROGRAM
Payer: MEDICARE

## 2023-02-27 VITALS
TEMPERATURE: 98 F | OXYGEN SATURATION: 98 % | HEIGHT: 65 IN | RESPIRATION RATE: 18 BRPM | WEIGHT: 171.96 LBS | SYSTOLIC BLOOD PRESSURE: 169 MMHG | HEART RATE: 56 BPM | DIASTOLIC BLOOD PRESSURE: 86 MMHG

## 2023-02-27 DIAGNOSIS — Z95.1 PRESENCE OF AORTOCORONARY BYPASS GRAFT: Chronic | ICD-10-CM

## 2023-02-27 DIAGNOSIS — K81.0 ACUTE CHOLECYSTITIS: ICD-10-CM

## 2023-02-27 DIAGNOSIS — Z98.890 OTHER SPECIFIED POSTPROCEDURAL STATES: Chronic | ICD-10-CM

## 2023-02-27 DIAGNOSIS — Z95.5 PRESENCE OF CORONARY ANGIOPLASTY IMPLANT AND GRAFT: Chronic | ICD-10-CM

## 2023-02-27 PROBLEM — Z78.9 OTHER SPECIFIED HEALTH STATUS: Chronic | Status: ACTIVE | Noted: 2022-08-22

## 2023-02-27 PROBLEM — E11.9 TYPE 2 DIABETES MELLITUS WITHOUT COMPLICATIONS: Chronic | Status: ACTIVE | Noted: 2022-08-22

## 2023-02-27 PROBLEM — H91.90 UNSPECIFIED HEARING LOSS, UNSPECIFIED EAR: Chronic | Status: ACTIVE | Noted: 2022-08-22

## 2023-02-27 PROBLEM — Z87.19 PERSONAL HISTORY OF OTHER DISEASES OF THE DIGESTIVE SYSTEM: Chronic | Status: ACTIVE | Noted: 2022-08-22

## 2023-02-27 PROBLEM — I25.10 ATHEROSCLEROTIC HEART DISEASE OF NATIVE CORONARY ARTERY WITHOUT ANGINA PECTORIS: Chronic | Status: ACTIVE | Noted: 2022-08-22

## 2023-02-27 LAB
ALBUMIN SERPL ELPH-MCNC: 3.9 G/DL — SIGNIFICANT CHANGE UP (ref 3.3–5)
ALP SERPL-CCNC: 61 U/L — SIGNIFICANT CHANGE UP (ref 40–120)
ALT FLD-CCNC: 9 U/L — LOW (ref 10–45)
ANION GAP SERPL CALC-SCNC: 15 MMOL/L — SIGNIFICANT CHANGE UP (ref 5–17)
AST SERPL-CCNC: 14 U/L — SIGNIFICANT CHANGE UP (ref 10–40)
BASOPHILS # BLD AUTO: 0.03 K/UL — SIGNIFICANT CHANGE UP (ref 0–0.2)
BASOPHILS NFR BLD AUTO: 0.3 % — SIGNIFICANT CHANGE UP (ref 0–2)
BILIRUB SERPL-MCNC: 0.3 MG/DL — SIGNIFICANT CHANGE UP (ref 0.2–1.2)
BUN SERPL-MCNC: 16 MG/DL — SIGNIFICANT CHANGE UP (ref 7–23)
CALCIUM SERPL-MCNC: 9.8 MG/DL — SIGNIFICANT CHANGE UP (ref 8.4–10.5)
CHLORIDE SERPL-SCNC: 99 MMOL/L — SIGNIFICANT CHANGE UP (ref 96–108)
CO2 SERPL-SCNC: 22 MMOL/L — SIGNIFICANT CHANGE UP (ref 22–31)
CREAT SERPL-MCNC: 0.88 MG/DL — SIGNIFICANT CHANGE UP (ref 0.5–1.3)
EGFR: 89 ML/MIN/1.73M2 — SIGNIFICANT CHANGE UP
EOSINOPHIL # BLD AUTO: 0.03 K/UL — SIGNIFICANT CHANGE UP (ref 0–0.5)
EOSINOPHIL NFR BLD AUTO: 0.3 % — SIGNIFICANT CHANGE UP (ref 0–6)
FLUAV AG NPH QL: SIGNIFICANT CHANGE UP
FLUBV AG NPH QL: SIGNIFICANT CHANGE UP
GLUCOSE SERPL-MCNC: 169 MG/DL — HIGH (ref 70–99)
HCT VFR BLD CALC: 44.6 % — SIGNIFICANT CHANGE UP (ref 39–50)
HGB BLD-MCNC: 15.6 G/DL — SIGNIFICANT CHANGE UP (ref 13–17)
IMM GRANULOCYTES NFR BLD AUTO: 0.8 % — SIGNIFICANT CHANGE UP (ref 0–0.9)
LACTATE BLDV-MCNC: 1.2 MMOL/L — SIGNIFICANT CHANGE UP (ref 0.5–2)
LIDOCAIN IGE QN: 14 U/L — SIGNIFICANT CHANGE UP (ref 7–60)
LYMPHOCYTES # BLD AUTO: 0.98 K/UL — LOW (ref 1–3.3)
LYMPHOCYTES # BLD AUTO: 8.2 % — LOW (ref 13–44)
MCHC RBC-ENTMCNC: 29.3 PG — SIGNIFICANT CHANGE UP (ref 27–34)
MCHC RBC-ENTMCNC: 35 GM/DL — SIGNIFICANT CHANGE UP (ref 32–36)
MCV RBC AUTO: 83.7 FL — SIGNIFICANT CHANGE UP (ref 80–100)
MONOCYTES # BLD AUTO: 0.71 K/UL — SIGNIFICANT CHANGE UP (ref 0–0.9)
MONOCYTES NFR BLD AUTO: 6 % — SIGNIFICANT CHANGE UP (ref 2–14)
NEUTROPHILS # BLD AUTO: 10.06 K/UL — HIGH (ref 1.8–7.4)
NEUTROPHILS NFR BLD AUTO: 84.4 % — HIGH (ref 43–77)
NRBC # BLD: 0 /100 WBCS — SIGNIFICANT CHANGE UP (ref 0–0)
PLATELET # BLD AUTO: 275 K/UL — SIGNIFICANT CHANGE UP (ref 150–400)
POTASSIUM SERPL-MCNC: 3.7 MMOL/L — SIGNIFICANT CHANGE UP (ref 3.5–5.3)
POTASSIUM SERPL-SCNC: 3.7 MMOL/L — SIGNIFICANT CHANGE UP (ref 3.5–5.3)
PROT SERPL-MCNC: 7.5 G/DL — SIGNIFICANT CHANGE UP (ref 6–8.3)
RBC # BLD: 5.33 M/UL — SIGNIFICANT CHANGE UP (ref 4.2–5.8)
RBC # FLD: 12.5 % — SIGNIFICANT CHANGE UP (ref 10.3–14.5)
RSV RNA NPH QL NAA+NON-PROBE: SIGNIFICANT CHANGE UP
SARS-COV-2 RNA SPEC QL NAA+PROBE: SIGNIFICANT CHANGE UP
SODIUM SERPL-SCNC: 136 MMOL/L — SIGNIFICANT CHANGE UP (ref 135–145)
WBC # BLD: 11.91 K/UL — HIGH (ref 3.8–10.5)
WBC # FLD AUTO: 11.91 K/UL — HIGH (ref 3.8–10.5)

## 2023-02-27 PROCEDURE — 99285 EMERGENCY DEPT VISIT HI MDM: CPT | Mod: GC

## 2023-02-27 PROCEDURE — 76775 US EXAM ABDO BACK WALL LIM: CPT | Mod: 26

## 2023-02-27 PROCEDURE — 76705 ECHO EXAM OF ABDOMEN: CPT | Mod: 26

## 2023-02-27 PROCEDURE — 74177 CT ABD & PELVIS W/CONTRAST: CPT | Mod: 26,MA

## 2023-02-27 RX ORDER — ACETAMINOPHEN 500 MG
650 TABLET ORAL ONCE
Refills: 0 | Status: COMPLETED | OUTPATIENT
Start: 2023-02-27 | End: 2023-02-27

## 2023-02-27 RX ORDER — PIPERACILLIN AND TAZOBACTAM 4; .5 G/20ML; G/20ML
3.38 INJECTION, POWDER, LYOPHILIZED, FOR SOLUTION INTRAVENOUS ONCE
Refills: 0 | Status: COMPLETED | OUTPATIENT
Start: 2023-02-27 | End: 2023-02-27

## 2023-02-27 RX ADMIN — PIPERACILLIN AND TAZOBACTAM 200 GRAM(S): 4; .5 INJECTION, POWDER, LYOPHILIZED, FOR SOLUTION INTRAVENOUS at 18:42

## 2023-02-27 RX ADMIN — Medication 650 MILLIGRAM(S): at 12:28

## 2023-02-27 RX ADMIN — Medication 650 MILLIGRAM(S): at 13:57

## 2023-02-27 NOTE — ED ADULT NURSE NOTE - OBJECTIVE STATEMENT
pt 78 year old male  here with wife,  for lower ab pain, with  diarrhea  for 3d.ays  Pt has hx of kidney stones and  diverticulitis.  Pt with mild td lower abdomen.  IVL placed and bloods sent as ordered Marcos

## 2023-02-27 NOTE — ED PROVIDER NOTE - RAPID ASSESSMENT
Dr. Otto Note: pt here with wife, lower ab pain, preceding diarrhea 3d.  Prior h/o ks and diverticultis.  Pt with mild td lower abdomen.     Patient was rapidly assessed via a telemedicine and/or role of Quick Triage Doctor; a limited history, physical exam and assessment was performed. The patient will be seen and further evaluated in the main emergency department. The remainder of care and evaluation will be conducted by the primary emergency medicine team. Receiving team will follow up on labs, imaging and serially reassess patient as indicated. All further decisions regarding patient care, evaluation and disposition are at the discretion of the receiving primary emergency department team.

## 2023-02-27 NOTE — ED PROVIDER NOTE - OBJECTIVE STATEMENT
78 year old male with PMH CABG, DM, HTN, HLD presents to the ED with 3 days of abdominal pain and diarrhea preceding, 1 episode of vomiting this morning. Denies fevers, chest pain, shortness of breath. Had bowel movement today. Hasn't taken anything for symptoms. Has history of abdominal hernia repair.

## 2023-02-27 NOTE — ED ADULT NURSE NOTE - NSICDXPASTMEDICALHX_GEN_ALL_CORE_FT
PAST MEDICAL HISTORY:  CAD (coronary artery disease)     Essential hypertension     History of pancreatitis years ago    [place] (hard of hearing)     Hypercholesterolemia     Poor historian     Type 2 diabetes mellitus

## 2023-02-27 NOTE — ED ADULT NURSE NOTE - NSIMPLEMENTINTERV_GEN_ALL_ED
Implemented All Universal Safety Interventions:  [place] to call system. Call bell, personal items and telephone within reach. Instruct patient to call for assistance. Room bathroom lighting operational. Non-slip footwear when patient is off stretcher. Physically safe environment: no spills, clutter or unnecessary equipment. Stretcher in lowest position, wheels locked, appropriate side rails in place.

## 2023-02-27 NOTE — ED PROVIDER NOTE - CLINICAL SUMMARY MEDICAL DECISION MAKING FREE TEXT BOX
phoebe 78 m with ho nephrolithiasis present with 1 day of abd pain to back on exam no rebound no guarding no fever pt had loose nonbloody stool - -on exam no cvat - no hematuria or dysuria - pain constant - pocus to eval for AAA - if neg ct to eval for colitis- lipase pending to exclude pancreatitis- no cp no sob-

## 2023-02-27 NOTE — ED ADULT NURSE REASSESSMENT NOTE - NS ED NURSE REASSESS COMMENT FT1
report received from JESUS BELTRAN. VSS. pt resting comfortably and no acute distress or pain noted. pending surgical consult. daughter at bedside

## 2023-02-27 NOTE — ED ADULT TRIAGE NOTE - HEIGHT IN INCHES
-- Message is from the [facility]--    Reason for Call: PATIENT IS CALLING TO ASK THE DOCTOR IF SHE SHOULD STOP TAKING TYLENOL 3 CODEINE DUE TO SHE WILL BE HAVING A DENTAL PROCEDURE. PLEASE CALL TO ADVISE        Alternative phone number: [phone]    Turnaround time given to caller:   \"This message will be sent to [state Provider's name]. The clinical team will fulfill your request as soon as they review your message.\"    
No need to hold Tylenol #3 prior dental procedures  
Spoke with pt- discussed Dr. SAEZ 's recommendations, pt verbalizes understanding.  
Spoke with pt- she is scheduled to have 5 teeth pulled tomorrow- pt previously spoke with Dr. Shetty and is holding her warfarin for 4 days prior.    Pt wasn't to know if she has to hold the Tylenol 3 prior to dental procedure- she stated that she needs it in order to walk around.    Please advise  
5

## 2023-02-27 NOTE — ED PROVIDER NOTE - PROGRESS NOTE DETAILS
pocus no aaa no hydro Becki Hunter, DO PGY-2  cholecystitis on CT, starting zosyn and consulted surgery.

## 2023-02-27 NOTE — ED PROVIDER NOTE - PHYSICAL EXAMINATION
PHYSICAL EXAM:  CONSTITUTIONAL: Uncomfortable appearing, awake, alert, oriented to person, place, time/situation and in no apparent distress.  HEAD: Atraumatic  EYES: Clear bilaterally, pupils equal, round and reactive to light.  ENMT: Airway patent, Nasal mucosa clear. Mouth with normal mucosa. Uvula is midline.   CARDIAC: Normal rate, regular rhythm. +S1/S2. No murmurs, rubs or gallops.  RESPIRATORY: Breathing unlabored. Breath sounds clear and equal bilaterally.  ABDOMEN:  Low abdominal tenderness to palpation. Abdomen soft, nondistended. No rebound tenderness or guarding.  NEUROLOGICAL: Alert and oriented, no focal deficits, no motor or sensory deficits.  MSK: No clubbing, cyanosis, or edema. Full range of motion of all extremities.   SKIN: Skin warm and dry. No evidence of rashes or lesions.

## 2023-02-27 NOTE — ED PROVIDER NOTE - NSICDXPASTMEDICALHX_GEN_ALL_CORE_FT
PAST MEDICAL HISTORY:  CAD (coronary artery disease)     Essential hypertension     History of pancreatitis years ago    [place] (hard of hearing)     Hypercholesterolemia     Poor historian     Type 2 diabetes mellitus

## 2023-02-28 ENCOUNTER — TRANSCRIPTION ENCOUNTER (OUTPATIENT)
Age: 78
End: 2023-02-28

## 2023-02-28 DIAGNOSIS — E78.00 PURE HYPERCHOLESTEROLEMIA, UNSPECIFIED: ICD-10-CM

## 2023-02-28 DIAGNOSIS — I10 ESSENTIAL (PRIMARY) HYPERTENSION: ICD-10-CM

## 2023-02-28 DIAGNOSIS — Z01.818 ENCOUNTER FOR OTHER PREPROCEDURAL EXAMINATION: ICD-10-CM

## 2023-02-28 DIAGNOSIS — I25.10 ATHEROSCLEROTIC HEART DISEASE OF NATIVE CORONARY ARTERY WITHOUT ANGINA PECTORIS: ICD-10-CM

## 2023-02-28 DIAGNOSIS — E11.9 TYPE 2 DIABETES MELLITUS WITHOUT COMPLICATIONS: ICD-10-CM

## 2023-02-28 DIAGNOSIS — F03.90 UNSPECIFIED DEMENTIA, UNSPECIFIED SEVERITY, WITHOUT BEHAVIORAL DISTURBANCE, PSYCHOTIC DISTURBANCE, MOOD DISTURBANCE, AND ANXIETY: ICD-10-CM

## 2023-02-28 LAB
ALBUMIN SERPL ELPH-MCNC: 3.7 G/DL — SIGNIFICANT CHANGE UP (ref 3.3–5)
ALP SERPL-CCNC: 52 U/L — SIGNIFICANT CHANGE UP (ref 40–120)
ALT FLD-CCNC: 8 U/L — LOW (ref 10–45)
ANION GAP SERPL CALC-SCNC: 15 MMOL/L — SIGNIFICANT CHANGE UP (ref 5–17)
APPEARANCE UR: CLEAR — SIGNIFICANT CHANGE UP
AST SERPL-CCNC: 9 U/L — LOW (ref 10–40)
BACTERIA # UR AUTO: NEGATIVE — SIGNIFICANT CHANGE UP
BILIRUB DIRECT SERPL-MCNC: 0.1 MG/DL — SIGNIFICANT CHANGE UP (ref 0–0.3)
BILIRUB INDIRECT FLD-MCNC: 0.3 MG/DL — SIGNIFICANT CHANGE UP (ref 0.2–1)
BILIRUB SERPL-MCNC: 0.4 MG/DL — SIGNIFICANT CHANGE UP (ref 0.2–1.2)
BILIRUB UR-MCNC: NEGATIVE — SIGNIFICANT CHANGE UP
BUN SERPL-MCNC: 12 MG/DL — SIGNIFICANT CHANGE UP (ref 7–23)
CALCIUM SERPL-MCNC: 9.5 MG/DL — SIGNIFICANT CHANGE UP (ref 8.4–10.5)
CHLORIDE SERPL-SCNC: 97 MMOL/L — SIGNIFICANT CHANGE UP (ref 96–108)
CO2 SERPL-SCNC: 22 MMOL/L — SIGNIFICANT CHANGE UP (ref 22–31)
COLOR SPEC: YELLOW — SIGNIFICANT CHANGE UP
CREAT SERPL-MCNC: 0.78 MG/DL — SIGNIFICANT CHANGE UP (ref 0.5–1.3)
DIFF PNL FLD: NEGATIVE — SIGNIFICANT CHANGE UP
EGFR: 92 ML/MIN/1.73M2 — SIGNIFICANT CHANGE UP
EPI CELLS # UR: 1 /HPF — SIGNIFICANT CHANGE UP
GLUCOSE BLDC GLUCOMTR-MCNC: 158 MG/DL — HIGH (ref 70–99)
GLUCOSE BLDC GLUCOMTR-MCNC: 168 MG/DL — HIGH (ref 70–99)
GLUCOSE BLDC GLUCOMTR-MCNC: 178 MG/DL — HIGH (ref 70–99)
GLUCOSE SERPL-MCNC: 180 MG/DL — HIGH (ref 70–99)
GLUCOSE UR QL: ABNORMAL
HCT VFR BLD CALC: 42.9 % — SIGNIFICANT CHANGE UP (ref 39–50)
HGB BLD-MCNC: 14.9 G/DL — SIGNIFICANT CHANGE UP (ref 13–17)
HYALINE CASTS # UR AUTO: 1 /LPF — SIGNIFICANT CHANGE UP (ref 0–2)
KETONES UR-MCNC: SIGNIFICANT CHANGE UP
LEUKOCYTE ESTERASE UR-ACNC: NEGATIVE — SIGNIFICANT CHANGE UP
MAGNESIUM SERPL-MCNC: 1.2 MG/DL — LOW (ref 1.6–2.6)
MCHC RBC-ENTMCNC: 28.8 PG — SIGNIFICANT CHANGE UP (ref 27–34)
MCHC RBC-ENTMCNC: 34.7 GM/DL — SIGNIFICANT CHANGE UP (ref 32–36)
MCV RBC AUTO: 83 FL — SIGNIFICANT CHANGE UP (ref 80–100)
NITRITE UR-MCNC: NEGATIVE — SIGNIFICANT CHANGE UP
NRBC # BLD: 0 /100 WBCS — SIGNIFICANT CHANGE UP (ref 0–0)
PH UR: 6 — SIGNIFICANT CHANGE UP (ref 5–8)
PHOSPHATE SERPL-MCNC: 2.9 MG/DL — SIGNIFICANT CHANGE UP (ref 2.5–4.5)
PLATELET # BLD AUTO: 287 K/UL — SIGNIFICANT CHANGE UP (ref 150–400)
POTASSIUM SERPL-MCNC: 3.2 MMOL/L — LOW (ref 3.5–5.3)
POTASSIUM SERPL-SCNC: 3.2 MMOL/L — LOW (ref 3.5–5.3)
PROT SERPL-MCNC: 7.2 G/DL — SIGNIFICANT CHANGE UP (ref 6–8.3)
PROT UR-MCNC: ABNORMAL
RBC # BLD: 5.17 M/UL — SIGNIFICANT CHANGE UP (ref 4.2–5.8)
RBC # FLD: 12.9 % — SIGNIFICANT CHANGE UP (ref 10.3–14.5)
RBC CASTS # UR COMP ASSIST: 7 /HPF — HIGH (ref 0–4)
SODIUM SERPL-SCNC: 134 MMOL/L — LOW (ref 135–145)
SP GR SPEC: 1.04 — HIGH (ref 1.01–1.02)
UROBILINOGEN FLD QL: NEGATIVE — SIGNIFICANT CHANGE UP
WBC # BLD: 16.37 K/UL — HIGH (ref 3.8–10.5)
WBC # FLD AUTO: 16.37 K/UL — HIGH (ref 3.8–10.5)
WBC UR QL: 1 /HPF — SIGNIFICANT CHANGE UP (ref 0–5)

## 2023-02-28 PROCEDURE — 93306 TTE W/DOPPLER COMPLETE: CPT | Mod: 26

## 2023-02-28 PROCEDURE — 93010 ELECTROCARDIOGRAM REPORT: CPT

## 2023-02-28 RX ORDER — SODIUM CHLORIDE 9 MG/ML
1000 INJECTION, SOLUTION INTRAVENOUS
Refills: 0 | Status: DISCONTINUED | OUTPATIENT
Start: 2023-02-28 | End: 2023-03-01

## 2023-02-28 RX ORDER — DULAGLUTIDE 4.5 MG/.5ML
1 INJECTION, SOLUTION SUBCUTANEOUS
Qty: 0 | Refills: 0 | DISCHARGE

## 2023-02-28 RX ORDER — DEXTROSE 50 % IN WATER 50 %
12.5 SYRINGE (ML) INTRAVENOUS ONCE
Refills: 0 | Status: DISCONTINUED | OUTPATIENT
Start: 2023-02-28 | End: 2023-03-01

## 2023-02-28 RX ORDER — PIPERACILLIN AND TAZOBACTAM 4; .5 G/20ML; G/20ML
3.38 INJECTION, POWDER, LYOPHILIZED, FOR SOLUTION INTRAVENOUS ONCE
Refills: 0 | Status: COMPLETED | OUTPATIENT
Start: 2023-02-28 | End: 2023-02-28

## 2023-02-28 RX ORDER — MEMANTINE HYDROCHLORIDE 10 MG/1
1 TABLET ORAL
Qty: 0 | Refills: 0 | DISCHARGE

## 2023-02-28 RX ORDER — OXYCODONE HYDROCHLORIDE 5 MG/1
2.5 TABLET ORAL EVERY 4 HOURS
Refills: 0 | Status: DISCONTINUED | OUTPATIENT
Start: 2023-02-28 | End: 2023-03-01

## 2023-02-28 RX ORDER — ACETAMINOPHEN 500 MG
1000 TABLET ORAL EVERY 6 HOURS
Refills: 0 | Status: COMPLETED | OUTPATIENT
Start: 2023-02-28 | End: 2023-03-01

## 2023-02-28 RX ORDER — POTASSIUM CHLORIDE 20 MEQ
10 PACKET (EA) ORAL
Refills: 0 | Status: COMPLETED | OUTPATIENT
Start: 2023-02-28 | End: 2023-02-28

## 2023-02-28 RX ORDER — METRONIDAZOLE 500 MG
500 TABLET ORAL ONCE
Refills: 0 | Status: COMPLETED | OUTPATIENT
Start: 2023-02-28 | End: 2023-02-28

## 2023-02-28 RX ORDER — ENOXAPARIN SODIUM 100 MG/ML
40 INJECTION SUBCUTANEOUS EVERY 24 HOURS
Refills: 0 | Status: DISCONTINUED | OUTPATIENT
Start: 2023-02-28 | End: 2023-03-01

## 2023-02-28 RX ORDER — MAGNESIUM SULFATE 500 MG/ML
2 VIAL (ML) INJECTION ONCE
Refills: 0 | Status: COMPLETED | OUTPATIENT
Start: 2023-02-28 | End: 2023-02-28

## 2023-02-28 RX ORDER — FENOFIBRATE,MICRONIZED 130 MG
1 CAPSULE ORAL
Qty: 0 | Refills: 0 | DISCHARGE

## 2023-02-28 RX ORDER — ASPIRIN/CALCIUM CARB/MAGNESIUM 324 MG
81 TABLET ORAL DAILY
Refills: 0 | Status: DISCONTINUED | OUTPATIENT
Start: 2023-02-28 | End: 2023-03-01

## 2023-02-28 RX ORDER — DEXTROSE 50 % IN WATER 50 %
25 SYRINGE (ML) INTRAVENOUS ONCE
Refills: 0 | Status: DISCONTINUED | OUTPATIENT
Start: 2023-02-28 | End: 2023-03-01

## 2023-02-28 RX ORDER — OXYBUTYNIN CHLORIDE 5 MG
1 TABLET ORAL
Qty: 0 | Refills: 0 | DISCHARGE

## 2023-02-28 RX ORDER — PIPERACILLIN AND TAZOBACTAM 4; .5 G/20ML; G/20ML
3.38 INJECTION, POWDER, LYOPHILIZED, FOR SOLUTION INTRAVENOUS EVERY 8 HOURS
Refills: 0 | Status: DISCONTINUED | OUTPATIENT
Start: 2023-02-28 | End: 2023-03-01

## 2023-02-28 RX ORDER — METRONIDAZOLE 500 MG
TABLET ORAL
Refills: 0 | Status: DISCONTINUED | OUTPATIENT
Start: 2023-02-28 | End: 2023-02-28

## 2023-02-28 RX ORDER — ROSUVASTATIN CALCIUM 5 MG/1
1 TABLET ORAL
Qty: 0 | Refills: 0 | DISCHARGE

## 2023-02-28 RX ORDER — GLUCAGON INJECTION, SOLUTION 0.5 MG/.1ML
1 INJECTION, SOLUTION SUBCUTANEOUS ONCE
Refills: 0 | Status: DISCONTINUED | OUTPATIENT
Start: 2023-02-28 | End: 2023-03-01

## 2023-02-28 RX ORDER — METFORMIN HYDROCHLORIDE 850 MG/1
1 TABLET ORAL
Qty: 0 | Refills: 0 | DISCHARGE

## 2023-02-28 RX ORDER — RAMIPRIL 5 MG
1 CAPSULE ORAL
Qty: 0 | Refills: 0 | DISCHARGE

## 2023-02-28 RX ORDER — DEXTROSE 50 % IN WATER 50 %
15 SYRINGE (ML) INTRAVENOUS ONCE
Refills: 0 | Status: DISCONTINUED | OUTPATIENT
Start: 2023-02-28 | End: 2023-03-01

## 2023-02-28 RX ORDER — CEFTRIAXONE 500 MG/1
1000 INJECTION, POWDER, FOR SOLUTION INTRAMUSCULAR; INTRAVENOUS EVERY 24 HOURS
Refills: 0 | Status: DISCONTINUED | OUTPATIENT
Start: 2023-02-28 | End: 2023-02-28

## 2023-02-28 RX ORDER — INSULIN LISPRO 100/ML
VIAL (ML) SUBCUTANEOUS EVERY 6 HOURS
Refills: 0 | Status: DISCONTINUED | OUTPATIENT
Start: 2023-02-28 | End: 2023-03-01

## 2023-02-28 RX ORDER — OXYBUTYNIN CHLORIDE 5 MG
5 TABLET ORAL
Refills: 0 | Status: DISCONTINUED | OUTPATIENT
Start: 2023-02-28 | End: 2023-03-01

## 2023-02-28 RX ORDER — METRONIDAZOLE 500 MG
500 TABLET ORAL EVERY 8 HOURS
Refills: 0 | Status: DISCONTINUED | OUTPATIENT
Start: 2023-02-28 | End: 2023-02-28

## 2023-02-28 RX ORDER — OXYBUTYNIN CHLORIDE 5 MG
10 TABLET ORAL DAILY
Refills: 0 | Status: DISCONTINUED | OUTPATIENT
Start: 2023-02-28 | End: 2023-02-28

## 2023-02-28 RX ORDER — ASPIRIN/CALCIUM CARB/MAGNESIUM 324 MG
1 TABLET ORAL
Qty: 0 | Refills: 0 | DISCHARGE

## 2023-02-28 RX ORDER — MEMANTINE HYDROCHLORIDE 10 MG/1
5 TABLET ORAL DAILY
Refills: 0 | Status: DISCONTINUED | OUTPATIENT
Start: 2023-02-28 | End: 2023-03-01

## 2023-02-28 RX ORDER — ATORVASTATIN CALCIUM 80 MG/1
80 TABLET, FILM COATED ORAL AT BEDTIME
Refills: 0 | Status: DISCONTINUED | OUTPATIENT
Start: 2023-02-28 | End: 2023-03-01

## 2023-02-28 RX ORDER — LISINOPRIL 2.5 MG/1
10 TABLET ORAL DAILY
Refills: 0 | Status: DISCONTINUED | OUTPATIENT
Start: 2023-02-28 | End: 2023-02-28

## 2023-02-28 RX ADMIN — Medication 25 GRAM(S): at 16:19

## 2023-02-28 RX ADMIN — Medication 81 MILLIGRAM(S): at 13:17

## 2023-02-28 RX ADMIN — PIPERACILLIN AND TAZOBACTAM 25 GRAM(S): 4; .5 INJECTION, POWDER, LYOPHILIZED, FOR SOLUTION INTRAVENOUS at 22:11

## 2023-02-28 RX ADMIN — CEFTRIAXONE 100 MILLIGRAM(S): 500 INJECTION, POWDER, FOR SOLUTION INTRAMUSCULAR; INTRAVENOUS at 01:36

## 2023-02-28 RX ADMIN — Medication 100 MILLIEQUIVALENT(S): at 09:27

## 2023-02-28 RX ADMIN — Medication 100 MILLIEQUIVALENT(S): at 15:16

## 2023-02-28 RX ADMIN — Medication 1000 MILLIGRAM(S): at 06:11

## 2023-02-28 RX ADMIN — SODIUM CHLORIDE 75 MILLILITER(S): 9 INJECTION, SOLUTION INTRAVENOUS at 01:34

## 2023-02-28 RX ADMIN — PIPERACILLIN AND TAZOBACTAM 25 GRAM(S): 4; .5 INJECTION, POWDER, LYOPHILIZED, FOR SOLUTION INTRAVENOUS at 16:14

## 2023-02-28 RX ADMIN — Medication 1: at 05:44

## 2023-02-28 RX ADMIN — ATORVASTATIN CALCIUM 80 MILLIGRAM(S): 80 TABLET, FILM COATED ORAL at 22:11

## 2023-02-28 RX ADMIN — Medication 1000 MILLIGRAM(S): at 13:50

## 2023-02-28 RX ADMIN — Medication 400 MILLIGRAM(S): at 05:41

## 2023-02-28 RX ADMIN — PIPERACILLIN AND TAZOBACTAM 200 GRAM(S): 4; .5 INJECTION, POWDER, LYOPHILIZED, FOR SOLUTION INTRAVENOUS at 14:10

## 2023-02-28 RX ADMIN — Medication 400 MILLIGRAM(S): at 13:17

## 2023-02-28 RX ADMIN — SODIUM CHLORIDE 75 MILLILITER(S): 9 INJECTION, SOLUTION INTRAVENOUS at 22:11

## 2023-02-28 RX ADMIN — Medication 100 MILLIEQUIVALENT(S): at 12:41

## 2023-02-28 RX ADMIN — Medication 100 MILLIGRAM(S): at 01:39

## 2023-02-28 RX ADMIN — Medication 5 MILLIGRAM(S): at 05:58

## 2023-02-28 RX ADMIN — LISINOPRIL 10 MILLIGRAM(S): 2.5 TABLET ORAL at 05:30

## 2023-02-28 RX ADMIN — Medication 1: at 13:18

## 2023-02-28 RX ADMIN — ENOXAPARIN SODIUM 40 MILLIGRAM(S): 100 INJECTION SUBCUTANEOUS at 05:31

## 2023-02-28 NOTE — H&P ADULT - HISTORY OF PRESENT ILLNESS
78 year old male with PMH CABG, umbilical hernia repair with mesh, DM, HTN, HLD presents to the ED with 3 days of abdominal pain and diarrhea preceding, 1 episode of vomiting this morning. Denies fevers, chest pain, shortness of breath.     In ER, patient is HDS, abd ruq tenderness, WBC 11/9, Tbil/LFTs/lipase wnl, US showed cholelithiasis with wall thicken and perichole fluid collection.  78 year old male with PMH CABG, umbilical hernia repair with mesh, DM, HTN, HLD presents to the ED with 3 days of abdominal pain and diarrhea preceding, 1 episode of vomiting this morning. Denies fevers, chest pain, shortness of breath.     In ER, patient is HDS, abd ruq tenderness, WBC 11.9, Tbil/LFTs/lipase wnl, US showed cholelithiasis with wall thicken and perichole fluid collection.  78 year old male with PMH CABG, umbilical hernia repair with mesh, DM, HTN, HLD presents to the ED with 3 days of abdominal pain and diarrhea preceding, 1 episode of vomiting this morning. Denies fevers, chest pain, shortness of breath.     In ER, patient is HDS, abd ruq tenderness, WBC 11.9, Tbil/LFTs/lipase wnl, US showed cholelithiasis with wall thicken and perichole fluid.

## 2023-02-28 NOTE — H&P ADULT - ATTENDING COMMENTS
DATE OF SERVICE: 02-28-23 @ 13:40    Seen and examined. 78M with PMHx as listed and PSHx of ventral hernia w mesh who presents with abdominal pain for 3d. Pain is diffuse per the pt and on the right side. Had similar episode in the past. Known to have gallstones.    Vitals reviewed  WBC 12 on admission, now 16; LFTs WNL   US: distended GB with 5mm wall, 6mm cbd    Abdomen soft +RUQ TTP, no rebound/guarding    Admit  Plan for lap adams on Wed 3/1  NPO for now  Zosyn for abx  medical consult for pre op optimization

## 2023-02-28 NOTE — H&P ADULT - NSHPLABSRESULTS_GEN_ALL_CORE
< from: US Abdomen Upper Quadrant Right (02.27.23 @ 21:13) >    ACC: [id number] EXAM:  US ABDOMEN RT UPR QUADRANT   ORDERED BY: ANTONIA ATWOOD     PROCEDURE DATE:  02/27/2023          INTERPRETATION:  CLINICAL INFORMATION: Right upper quadrant pain, nausea,   and vomiting.    COMPARISON: Same day CT abdomen pelvis and plan of care abdominal   ultrasound    TECHNIQUE: Sonography of the right upper quadrant.    FINDINGS:  Liver: Within normal limits.  Bile ducts: Minimal intrahepatic biliary ductal dilatation. Common bile   duct measures 6 mm  Gallbladder:Distended gallbladder containing stones and echogenic sludge   along with wall thickening measuring 5 mm. Negative sonographic Pan   sign per technologist report.  Pancreas: Visualized portions are within normal limits.  Right kidney: 11.4 cm. No hydronephrosis. Right lower pole cyst measuring   1.9 cm. Nonobstructing right lower pole renal calculus measuring 0.4 cm.  Ascites: None.  IVC: Visualized portions are within normal limits.    IMPRESSION:    Distended gallbladder with wall thickeningand echogenic stones/sludge   concerning for acute cholecystitis.    Minimal intrahepatic biliary ductal dilatation. Normal common bile duct   diameter.    --- End of Report ---    < end of copied text >

## 2023-02-28 NOTE — H&P ADULT - NSICDXPASTMEDICALHX_GEN_ALL_CORE_FT
PAST MEDICAL HISTORY:  CAD (coronary artery disease)     Essential hypertension     History of pancreatitis years ago    [place] (hard of hearing)     Hypercholesterolemia     Poor historian     Type 2 diabetes mellitus

## 2023-02-28 NOTE — CONSULT NOTE ADULT - PROBLEM SELECTOR RECOMMENDATION 2
finger sticks acc  no oral meds  continue finger sticks with short acting insulin sliding scale  HbA1C in AM

## 2023-02-28 NOTE — DISCHARGE NOTE NURSING/CASE MANAGEMENT/SOCIAL WORK - PATIENT PORTAL LINK FT
You can access the FollowMyHealth Patient Portal offered by [facility] by registering at the following website: [email or web address]. By joining [facility]’s FollowMyHealth portal, you will also be able to view your health information using other applications (apps) compatible with our system.

## 2023-02-28 NOTE — CONSULT NOTE ADULT - PROBLEM SELECTOR RECOMMENDATION 9
the patient is euvolemic and as per family has absolutely no h/o exertional chest pain  he has no crackles or leg edema  his EKG is nonspecific with no significant abnormalities poor R wave progression but no acute changes NSR 75 BPM  echocardiogram done and shows normal LV function with no major valvular abnormalities  patient is optimized for proposed procedure

## 2023-02-28 NOTE — H&P ADULT - ASSESSMENT
78 year old male with PMH CABG, umbilical hernia repair with mesh, DM, HTN, HLD  w/ acute cholecystitis    Plan:  - admit to surgery under  , floor bed  - suggest urgent cholecystectomy, talked to patient and family, patient and family want to hold surgery and try conservative treatment first, if symptoms do not improve, will consider surgery   - pain control prn  - NPO  - IVF   - abx, CTX and flagyl   - plan discussed with Raymundo Bro PGY-2  [facility]  [id number]  78 year old male with PMH CABG, umbilical hernia repair with mesh, DM, HTN, HLD  w/ acute cholecystitis    Plan:  - admit to surgery under  , floor bed  - suggest cholecystectomy, talked to patient and family, patient and family want to hold surgery and try conservative treatment first, if symptoms do not improve, will consider surgery   - pain control prn  - NPO  - IVF   - abx, CTX and flagyl   - plan discussed with Raymundo Bro PGY-2  [facility]  [id number]

## 2023-02-28 NOTE — H&P ADULT - NSHPPHYSICALEXAM_GEN_ALL_CORE
PHYSICAL EXAM: ***  General: NAD, Lying in bed comfortably  Neuro: A+Ox3  HEENT: NC/AT, EOMI  Neck: Soft, supple  Cardio: RRR, nml S1/S2  Resp: Good effort, CTA b/l  Thorax: No chest wall tenderness  Breast: No lesions/masses, no drainage  GI/Abd: Soft, moderate RUQ tenderness, ND, no rebound/guarding, no masses palpated  Vascular: All 4 extremities warm.  Skin: Intact, no breakdown  Lymphatic/Nodes: No palpable lymphadenopathy  Musculoskeletal: All 4 extremities moving spontaneously, no limitations

## 2023-02-28 NOTE — PATIENT PROFILE ADULT - FALL HARM RISK - HARM RISK INTERVENTIONS
Assistance with ambulation/Assistance OOB with selected safe patient handling equipment/Communicate Risk of Fall with Harm to all staff/Discuss with provider need for PT consult/Monitor for mental status changes/Monitor gait and stability/Provide patient with walking aids - walker, cane, crutches/Reinforce activity limits and safety measures with patient and family/Tailored Fall Risk Interventions/Visual Cue: Yellow wristband and red socks/Bed in lowest position, wheels locked, appropriate side rails in place/Call bell, personal items and telephone in reach/Instruct patient to call for assistance before getting out of bed or chair/Non-slip footwear when patient is out of bed/[place] to call system/Physically safe environment - no spills, clutter or unnecessary equipment/Purposeful Proactive Rounding/Room/bathroom lighting operational, light cord in reach

## 2023-02-28 NOTE — CONSULT NOTE ADULT - SUBJECTIVE AND OBJECTIVE BOX
DATE OF SERVICE: 02-28-23 @ 12:29    Patient is a 78y old  Male who presents with a chief complaint of acute cholecystitis (28 Feb 2023 00:23)  history from daughter and kids at home     HPI:  78 year old male with PMH CABG, umbilical hernia repair with mesh, DM, HTN, HLD presents to the ED with 3 days of abdominal pain and diarrhea preceding, 1 episode of vomiting this morning. Denies fevers, chest pain, shortness of breath. The patient is of limited capacity to answer secondary to dementia per family at bedside. The patient initially opted for conservative management but now amenable for surgery       PAST MEDICAL & SURGICAL HISTORY:    Essential hypertension      Hypercholesterolemia      CAD (coronary artery disease)      History of pancreatitis  years ago      Type 2 diabetes mellitus      [place] (hard of hearing)      Poor historian      H/O hernia repair  bilateral      S/P CABG (coronary artery bypass graft)  x 3  15 years ago      History of bunionectomy of right great toe  4 years a go      S/P coronary artery stent placement  x 4    20  years ago      S/P LASIK surgery          Review of Systems:   CONSTITUTIONAL: No fever, weight loss, or fatigue  NECK: No pain or stiffness  RESPIRATORY: No cough, wheezing noted by family  CARDIOVASCULAR: No chest pain  GASTROINTESTINAL: see above HPI   GENITOURINARY: No dysuria, frequency, hematuria  NEUROLOGICAL: No headaches  MUSCULOSKELETAL: No joint pain or swelling    Allergies    phenothiazines (Hives)  Plavix (Hives)    Social History:  non smoker  no IVDA  no ETOH abuse   lives with family     FAMILY HISTORY:  No pertinent family history in first degree relatives        MEDICATIONS  (STANDING):  acetaminophen   IVPB .. 1000 milliGRAM(s) IV Intermittent every 6 hours  aspirin enteric coated 81 milliGRAM(s) Oral daily  atorvastatin 80 milliGRAM(s) Oral at bedtime  cefTRIAXone   IVPB 1000 milliGRAM(s) IV Intermittent every 24 hours  dextrose 5%. 1000 milliLiter(s) (50 mL/Hr) IV Continuous <Continuous>  dextrose 5%. 1000 milliLiter(s) (100 mL/Hr) IV Continuous <Continuous>  dextrose 50% Injectable 25 Gram(s) IV Push once  dextrose 50% Injectable 12.5 Gram(s) IV Push once  dextrose 50% Injectable 25 Gram(s) IV Push once  enoxaparin Injectable 40 milliGRAM(s) SubCutaneous every 24 hours  glucagon  Injectable 1 milliGRAM(s) IntraMuscular once  insulin lispro (ADMELOG) corrective regimen sliding scale   SubCutaneous every 6 hours  lactated ringers. 1000 milliLiter(s) (75 mL/Hr) IV Continuous <Continuous>  lisinopril 10 milliGRAM(s) Oral daily  magnesium sulfate  IVPB 2 Gram(s) IV Intermittent once  memantine 5 milliGRAM(s) Oral daily  metroNIDAZOLE  IVPB      metroNIDAZOLE  IVPB 500 milliGRAM(s) IV Intermittent every 8 hours  oxybutynin 5 milliGRAM(s) Oral two times a day  potassium chloride  10 mEq/100 mL IVPB 10 milliEquivalent(s) IV Intermittent every 1 hour    MEDICATIONS  (PRN):  dextrose Oral Gel 15 Gram(s) Oral once PRN Blood Glucose LESS THAN 70 milliGRAM(s)/deciliter  oxyCODONE    IR 2.5 milliGRAM(s) Oral every 4 hours PRN moderate and severe      CAPILLARY BLOOD GLUCOSE      POCT Blood Glucose.: 178 mg/dL (28 Feb 2023 05:29)    I&O's Summary    27 Feb 2023 07:01  -  28 Feb 2023 07:00  --------------------------------------------------------  IN: 525 mL / OUT: 150 mL / NET: 375 mL        24hrs Vital:  T(C): 36.9 (02-28-23 @ 10:00), Max: 37.2 (02-27-23 @ 14:31)  HR: 67 (02-28-23 @ 10:00) (67 - 85)  BP: 121/66 (02-28-23 @ 10:00) (121/66 - 187/89)  RR: 18 (02-28-23 @ 10:00) (18 - 20)  SpO2: 96% (02-28-23 @ 10:00) (92% - 100%)    PHYSICAL EXAM:  GENERAL: NAD, well-developed  HEAD:  Atraumatic, Normocephalic  EYES: EOMI, PERRLA,  NECK: Supple, No JVD  CHEST/LUNG: Clear No wheeze  HEART: S1S2; No rubs, or gallops, no murmurs  ABDOMEN: Soft, minimal RUQ tenderness   EXTREMITIES: no edema  PSYCH: alert at baseline   NEUROLOGY: no focal motor  SKIN: No rashes or lesions    LABS:                        14.9   16.37 )-----------( 287      ( 28 Feb 2023 07:06 )             42.9     02-28    134<L>  |  97  |  12  ----------------------------<  180<H>  3.2<L>   |  22  |  0.78    Ca    9.5      28 Feb 2023 07:07  Phos  2.9     02-28  Mg     1.2     02-28    TPro  7.2  /  Alb  3.7  /  TBili  0.4  /  DBili  0.1  /  AST  9<L>  /  ALT  8<L>  /  AlkPhos  52  02-28              RADIOLOGY & ADDITIONAL TESTS:    Consultant(s) Notes Reviewed:      Care Discussed with Consultants/Other Providers:

## 2023-03-01 ENCOUNTER — TRANSCRIPTION ENCOUNTER (OUTPATIENT)
Age: 78
End: 2023-03-01

## 2023-03-01 LAB
A1C WITH ESTIMATED AVERAGE GLUCOSE RESULT: 7.8 % — HIGH (ref 4–5.6)
ALBUMIN SERPL ELPH-MCNC: 3.4 G/DL — SIGNIFICANT CHANGE UP (ref 3.3–5)
ALP SERPL-CCNC: 63 U/L — SIGNIFICANT CHANGE UP (ref 40–120)
ALT FLD-CCNC: 11 U/L — SIGNIFICANT CHANGE UP (ref 10–45)
ANION GAP SERPL CALC-SCNC: 16 MMOL/L — SIGNIFICANT CHANGE UP (ref 5–17)
APTT BLD: 29.2 SEC — SIGNIFICANT CHANGE UP (ref 27.5–35.5)
AST SERPL-CCNC: 18 U/L — SIGNIFICANT CHANGE UP (ref 10–40)
BILIRUB SERPL-MCNC: 0.4 MG/DL — SIGNIFICANT CHANGE UP (ref 0.2–1.2)
BLD GP AB SCN SERPL QL: NEGATIVE — SIGNIFICANT CHANGE UP
BUN SERPL-MCNC: 14 MG/DL — SIGNIFICANT CHANGE UP (ref 7–23)
CALCIUM SERPL-MCNC: 9.6 MG/DL — SIGNIFICANT CHANGE UP (ref 8.4–10.5)
CHLORIDE SERPL-SCNC: 98 MMOL/L — SIGNIFICANT CHANGE UP (ref 96–108)
CO2 SERPL-SCNC: 21 MMOL/L — LOW (ref 22–31)
CREAT SERPL-MCNC: 0.87 MG/DL — SIGNIFICANT CHANGE UP (ref 0.5–1.3)
CULTURE RESULTS: SIGNIFICANT CHANGE UP
EGFR: 89 ML/MIN/1.73M2 — SIGNIFICANT CHANGE UP
ESTIMATED AVERAGE GLUCOSE: 177 MG/DL — HIGH (ref 68–114)
GLUCOSE BLDC GLUCOMTR-MCNC: 155 MG/DL — HIGH (ref 70–99)
GLUCOSE BLDC GLUCOMTR-MCNC: 156 MG/DL — HIGH (ref 70–99)
GLUCOSE BLDC GLUCOMTR-MCNC: 176 MG/DL — HIGH (ref 70–99)
GLUCOSE BLDC GLUCOMTR-MCNC: 188 MG/DL — HIGH (ref 70–99)
GLUCOSE SERPL-MCNC: 163 MG/DL — HIGH (ref 70–99)
HCT VFR BLD CALC: 43.3 % — SIGNIFICANT CHANGE UP (ref 39–50)
HCV AB S/CO SERPL IA: 0.11 S/CO — SIGNIFICANT CHANGE UP (ref 0–0.99)
HCV AB SERPL-IMP: SIGNIFICANT CHANGE UP
HGB BLD-MCNC: 14.9 G/DL — SIGNIFICANT CHANGE UP (ref 13–17)
INR BLD: 1.6 RATIO — HIGH (ref 0.88–1.16)
MAGNESIUM SERPL-MCNC: 1.8 MG/DL — SIGNIFICANT CHANGE UP (ref 1.6–2.6)
MCHC RBC-ENTMCNC: 28.5 PG — SIGNIFICANT CHANGE UP (ref 27–34)
MCHC RBC-ENTMCNC: 34.4 GM/DL — SIGNIFICANT CHANGE UP (ref 32–36)
MCV RBC AUTO: 82.8 FL — SIGNIFICANT CHANGE UP (ref 80–100)
NRBC # BLD: 0 /100 WBCS — SIGNIFICANT CHANGE UP (ref 0–0)
PHOSPHATE SERPL-MCNC: 2.6 MG/DL — SIGNIFICANT CHANGE UP (ref 2.5–4.5)
PLATELET # BLD AUTO: 281 K/UL — SIGNIFICANT CHANGE UP (ref 150–400)
POTASSIUM SERPL-MCNC: 3.8 MMOL/L — SIGNIFICANT CHANGE UP (ref 3.5–5.3)
POTASSIUM SERPL-SCNC: 3.8 MMOL/L — SIGNIFICANT CHANGE UP (ref 3.5–5.3)
PROT SERPL-MCNC: 7.3 G/DL — SIGNIFICANT CHANGE UP (ref 6–8.3)
PROTHROM AB SERPL-ACNC: 18.5 SEC — HIGH (ref 10.5–13.4)
RBC # BLD: 5.23 M/UL — SIGNIFICANT CHANGE UP (ref 4.2–5.8)
RBC # FLD: 13.1 % — SIGNIFICANT CHANGE UP (ref 10.3–14.5)
RH IG SCN BLD-IMP: POSITIVE — SIGNIFICANT CHANGE UP
SODIUM SERPL-SCNC: 135 MMOL/L — SIGNIFICANT CHANGE UP (ref 135–145)
SPECIMEN SOURCE: SIGNIFICANT CHANGE UP
WBC # BLD: 20.84 K/UL — HIGH (ref 3.8–10.5)
WBC # FLD AUTO: 20.84 K/UL — HIGH (ref 3.8–10.5)

## 2023-03-01 PROCEDURE — 99223 1ST HOSP IP/OBS HIGH 75: CPT

## 2023-03-01 PROCEDURE — 88304 TISSUE EXAM BY PATHOLOGIST: CPT | Mod: 26

## 2023-03-01 PROCEDURE — 71045 X-RAY EXAM CHEST 1 VIEW: CPT | Mod: 26,76

## 2023-03-01 DEVICE — ARISTA 3GR: Type: IMPLANTABLE DEVICE | Status: FUNCTIONAL

## 2023-03-01 DEVICE — CLIP APPLIER COVIDIEN ENDOCLIP II 10MM MED/LG: Type: IMPLANTABLE DEVICE | Status: FUNCTIONAL

## 2023-03-01 DEVICE — SURGICEL 2 X 14": Type: IMPLANTABLE DEVICE | Status: FUNCTIONAL

## 2023-03-01 RX ORDER — GLUCAGON INJECTION, SOLUTION 0.5 MG/.1ML
1 INJECTION, SOLUTION SUBCUTANEOUS ONCE
Refills: 0 | Status: DISCONTINUED | OUTPATIENT
Start: 2023-03-01 | End: 2023-03-06

## 2023-03-01 RX ORDER — HYDROMORPHONE HYDROCHLORIDE 2 MG/ML
0.5 INJECTION INTRAMUSCULAR; INTRAVENOUS; SUBCUTANEOUS
Refills: 0 | Status: DISCONTINUED | OUTPATIENT
Start: 2023-03-01 | End: 2023-03-01

## 2023-03-01 RX ORDER — DEXTROSE 50 % IN WATER 50 %
12.5 SYRINGE (ML) INTRAVENOUS ONCE
Refills: 0 | Status: DISCONTINUED | OUTPATIENT
Start: 2023-03-01 | End: 2023-03-06

## 2023-03-01 RX ORDER — INSULIN LISPRO 100/ML
VIAL (ML) SUBCUTANEOUS EVERY 6 HOURS
Refills: 0 | Status: DISCONTINUED | OUTPATIENT
Start: 2023-03-01 | End: 2023-03-01

## 2023-03-01 RX ORDER — DEXTROSE 50 % IN WATER 50 %
25 SYRINGE (ML) INTRAVENOUS ONCE
Refills: 0 | Status: DISCONTINUED | OUTPATIENT
Start: 2023-03-01 | End: 2023-03-06

## 2023-03-01 RX ORDER — MAGNESIUM SULFATE 500 MG/ML
2 VIAL (ML) INJECTION ONCE
Refills: 0 | Status: COMPLETED | OUTPATIENT
Start: 2023-03-01 | End: 2023-03-01

## 2023-03-01 RX ORDER — ENOXAPARIN SODIUM 100 MG/ML
40 INJECTION SUBCUTANEOUS EVERY 24 HOURS
Refills: 0 | Status: DISCONTINUED | OUTPATIENT
Start: 2023-03-02 | End: 2023-03-06

## 2023-03-01 RX ORDER — OXYBUTYNIN CHLORIDE 5 MG
5 TABLET ORAL
Refills: 0 | Status: DISCONTINUED | OUTPATIENT
Start: 2023-03-01 | End: 2023-03-06

## 2023-03-01 RX ORDER — MEMANTINE HYDROCHLORIDE 10 MG/1
5 TABLET ORAL DAILY
Refills: 0 | Status: DISCONTINUED | OUTPATIENT
Start: 2023-03-01 | End: 2023-03-06

## 2023-03-01 RX ORDER — INSULIN LISPRO 100/ML
VIAL (ML) SUBCUTANEOUS
Refills: 0 | Status: DISCONTINUED | OUTPATIENT
Start: 2023-03-01 | End: 2023-03-06

## 2023-03-01 RX ORDER — ATORVASTATIN CALCIUM 80 MG/1
80 TABLET, FILM COATED ORAL AT BEDTIME
Refills: 0 | Status: DISCONTINUED | OUTPATIENT
Start: 2023-03-01 | End: 2023-03-06

## 2023-03-01 RX ORDER — PIPERACILLIN AND TAZOBACTAM 4; .5 G/20ML; G/20ML
3.38 INJECTION, POWDER, LYOPHILIZED, FOR SOLUTION INTRAVENOUS EVERY 8 HOURS
Refills: 0 | Status: DISCONTINUED | OUTPATIENT
Start: 2023-03-01 | End: 2023-03-04

## 2023-03-01 RX ORDER — ASPIRIN/CALCIUM CARB/MAGNESIUM 324 MG
81 TABLET ORAL DAILY
Refills: 0 | Status: DISCONTINUED | OUTPATIENT
Start: 2023-03-02 | End: 2023-03-06

## 2023-03-01 RX ORDER — ACETAMINOPHEN 500 MG
975 TABLET ORAL EVERY 6 HOURS
Refills: 0 | Status: DISCONTINUED | OUTPATIENT
Start: 2023-03-01 | End: 2023-03-06

## 2023-03-01 RX ORDER — ONDANSETRON 8 MG/1
4 TABLET, FILM COATED ORAL ONCE
Refills: 0 | Status: DISCONTINUED | OUTPATIENT
Start: 2023-03-01 | End: 2023-03-01

## 2023-03-01 RX ORDER — DEXTROSE 50 % IN WATER 50 %
15 SYRINGE (ML) INTRAVENOUS ONCE
Refills: 0 | Status: DISCONTINUED | OUTPATIENT
Start: 2023-03-01 | End: 2023-03-06

## 2023-03-01 RX ADMIN — Medication 1: at 00:25

## 2023-03-01 RX ADMIN — MEMANTINE HYDROCHLORIDE 5 MILLIGRAM(S): 10 TABLET ORAL at 12:14

## 2023-03-01 RX ADMIN — Medication 1: at 21:13

## 2023-03-01 RX ADMIN — Medication 5 MILLIGRAM(S): at 06:56

## 2023-03-01 RX ADMIN — Medication 63.75 MILLIMOLE(S): at 15:01

## 2023-03-01 RX ADMIN — Medication 1: at 06:56

## 2023-03-01 RX ADMIN — Medication 25 GRAM(S): at 12:14

## 2023-03-01 RX ADMIN — PIPERACILLIN AND TAZOBACTAM 25 GRAM(S): 4; .5 INJECTION, POWDER, LYOPHILIZED, FOR SOLUTION INTRAVENOUS at 23:28

## 2023-03-01 RX ADMIN — Medication 975 MILLIGRAM(S): at 23:28

## 2023-03-01 RX ADMIN — ENOXAPARIN SODIUM 40 MILLIGRAM(S): 100 INJECTION SUBCUTANEOUS at 06:54

## 2023-03-01 RX ADMIN — PIPERACILLIN AND TAZOBACTAM 25 GRAM(S): 4; .5 INJECTION, POWDER, LYOPHILIZED, FOR SOLUTION INTRAVENOUS at 06:54

## 2023-03-01 RX ADMIN — ATORVASTATIN CALCIUM 80 MILLIGRAM(S): 80 TABLET, FILM COATED ORAL at 23:28

## 2023-03-01 RX ADMIN — Medication 81 MILLIGRAM(S): at 12:14

## 2023-03-01 NOTE — DISCHARGE NOTE PROVIDER - NSDCHC_MEDRECSTATUS_GEN_ALL_CORE
Admission Reconciliation is Not Complete  Discharge Reconciliation is Not Complete Admission Reconciliation is Not Complete  Discharge Reconciliation is Completed Complex Repair And Graft Additional Text (Will Appearing After The Standard Complex Repair Text): The complex repair was not sufficient to completely close the primary defect. The remaining additional defect was repaired with the graft mentioned below.

## 2023-03-01 NOTE — DISCHARGE NOTE PROVIDER - HOSPITAL COURSE
HPI:  78 year old male with PMH CABG, umbilical hernia repair with mesh, DM, HTN, HLD presents to the ED with 3 days of abdominal pain and diarrhea preceding, 1 episode of vomiting this morning. Denies fevers, chest pain, shortness of breath.     In ER, patient is HDS, abd ruq tenderness, WBC 11.9, Tbil/LFTs/lipase wnl, US showed cholelithiasis with wall thicken and perichole fluid.  (28 Feb 2023 00:23)      Imaging (CT and US) show distended gallbladder with stones and wall thickening concerning for acute adams. Admitted to surgery and started on IV antibiotics. Kept NPO. Internal medicine was consulted for optimization and clearance for possible OR. Patient's family was concerned about surgery but eventually decided to move forward. TTE was obtained prior and showed Moderate concentric left ventricular hypertrophy, normal left ventricular systolic function, No segmental wall motion abnormalities, Normal right ventricular size and function.     Underwent _______ and was transferred to the PACU, then floor for observation. Postop, pain well controlled. Diet advanced as tolerated. Labs trended. DVT ppx with Lovenox. Incentive spirometry and ambulation encouraged.                  HPI:  78 year old male with PMH CABG, umbilical hernia repair with mesh, DM, HTN, HLD presents to the ED with 3 days of abdominal pain and diarrhea preceding, 1 episode of vomiting this morning. Denies fevers, chest pain, shortness of breath.     In ER, patient is HDS, abd ruq tenderness, WBC 11.9, Tbil/LFTs/lipase wnl, US showed cholelithiasis with wall thicken and perichole fluid.  (28 Feb 2023 00:23)      Imaging (CT and US) show distended gallbladder with stones and wall thickening concerning for acute adams. Admitted to surgery and started on IV antibiotics. Kept NPO. Internal medicine was consulted for optimization and clearance for possible OR. Patient's family was concerned about surgery but eventually decided to move forward. TTE was obtained prior and showed Moderate concentric left ventricular hypertrophy, normal left ventricular systolic function, No segmental wall motion abnormalities, Normal right ventricular size and function.     Underwent laparoscopic cholecystectomy and was transferred to the PACU, then floor for observation. Postop, pain well controlled. Diet advanced as tolerated. ID consulted for antibiotic regimen and Zosyn was changed to Ceftriaxone and Flagyl when cultures resulted. PHILLIP drain output monitored and recorded. Home meds restarted. Labs trended with resolution of leukocytosis. DVT ppx with Lovenox. Incentive spirometry and ambulation encouraged. PT evaluated patient and determined BRISEIDA upon discharge. At time of d/c, patient hemodynamically stable, pain well controlled and diet tolerated.

## 2023-03-01 NOTE — DISCHARGE NOTE PROVIDER - NSDCMRMEDTOKEN_GEN_ALL_CORE_FT
Ecotrin Adult Low Strength 81 mg oral delayed release tablet: 1 tab(s) orally once a day  fenofibrate 160 mg oral tablet: 1 tab(s) orally once a day  memantine 5 mg oral tablet: 1 tab(s) orally once a day  metFORMIN 500 mg oral tablet, extended release: 1 tab(s) orally 2 times a day  oxybutynin 10 mg/24 hr oral tablet, extended release: 1 tab(s) orally once a day  ramipril 2.5 mg oral tablet: 1 tab(s) orally once a day  Rolling Walker: Rolling Walker (1)  rosuvastatin 20 mg oral tablet: 1 tab(s) orally once a day  Trulicity Pen: 1 day(s) subcutaneous once a week   Ceftin 500 mg oral tablet: 1 tab(s) orally every 12 hours  Ecotrin Adult Low Strength 81 mg oral delayed release tablet: 1 tab(s) orally once a day  fenofibrate 160 mg oral tablet: 1 tab(s) orally once a day  Flagyl 500 mg oral tablet: 1 tab(s) orally 3 times a day  memantine 5 mg oral tablet: 1 tab(s) orally once a day  metFORMIN 500 mg oral tablet, extended release: 1 tab(s) orally 2 times a day  oxybutynin 10 mg/24 hr oral tablet, extended release: 1 tab(s) orally once a day  ramipril 2.5 mg oral tablet: 1 tab(s) orally once a day  [place]y Walker: Rolling Walker (1)  rosuvastatin 20 mg oral tablet: 1 tab(s) orally once a day  Trulicity Pen: 1 day(s) subcutaneous once a week

## 2023-03-01 NOTE — PRE-ANESTHESIA EVALUATION ADULT - NSANTHPMHFT_GEN_ALL_CORE
78M w/PMH of HTN. HLD, DM2, CABG, previous umbilical hernia repair with mesh, p/w acute cholecystitis. WBC 12k on admission, most recently 16k; LFTs WNL; ultrasound demonstrating distended GB with 5mm wall, 6mm CBD. Patient awaiting OR for laparoscopic cholecystectomy.

## 2023-03-01 NOTE — DISCHARGE NOTE PROVIDER - CARE PROVIDER_API CALL
Paul Sanchez (DO)  Surgery  [address], [address]  [place] [address]  Phone: [phone]  Fax: [phone]  Follow Up Time: 1 week

## 2023-03-01 NOTE — DISCHARGE NOTE PROVIDER - NSDCCPCAREPLAN_GEN_ALL_CORE_FT
PRINCIPAL DISCHARGE DIAGNOSIS  Diagnosis: Acute cholecystitis  Assessment and Plan of Treatment: WOUND CARE: Please remove any outer dressings in 24 hours. DO NOT remove steri-strips - they will fall off on their own.  BATHING: Please do not submerge wound underwater. No swimming pools, no hot tubs, no tub baths. You may shower and/or sponge bathe in 24 hours. Let soapy water run over incisions. DO NOT scrub or soak incision sites. Pat dry.   ACTIVITY: No heavy lifting more than 10-15lbs or straining. Otherwise, you may return to your usual level of physical activity. You may complete your daily activities. Take frequent walks. If you are taking narcotic pain medication (such as Oxycodone), do NOT drive a car, operate machinery or make important decisions.  DIET: Regular diet as tolerated  NOTIFY YOUR SURGEON IF: You have any bleeding that does not stop, any pus draining from your wound, any fever (over 100.4 F) or chills, persistent nausea/vomiting with inability to tolerate food or liquids, persistent diarrhea, or if your pain is not controlled on your discharge pain medications.  FOLLOW-UP:  1. Please call to make a follow-up appointment with your surgeon  within one week of discharge.  2. Please follow up with your primary care physician in one week regarding your hospitalization.       PRINCIPAL DISCHARGE DIAGNOSIS  Diagnosis: Acute cholecystitis  Assessment and Plan of Treatment: WOUND CARE: Please remove any outer dressings in 24 hours. DO NOT remove steri-strips - they will fall off on their own. Change dressing around drain as needed. Monitor and record PHILLIP drain outputs. DO NOT pin PHILLIP drain to outer clothing. Drain to be assessed and removed as outpatient.   BATHING: Please do not submerge wound underwater. No swimming pools, no hot tubs, no tub baths. You may shower and/or sponge bathe in 24 hours. Let soapy water run over incisions. DO NOT scrub or soak incision sites. Pat dry.   ACTIVITY: No heavy lifting more than 10-15lbs or straining. Otherwise, you may return to your usual level of physical activity. You may complete your daily activities. Take frequent walks. If you are taking narcotic pain medication (such as Oxycodone), do NOT drive a car, operate machinery or make important decisions.  DIET: Regular diet as tolerated  NOTIFY YOUR SURGEON IF: You have any bleeding that does not stop, any pus draining from your wound, any fever (over 100.4 F) or chills, persistent nausea/vomiting with inability to tolerate food or liquids, persistent diarrhea, or if your pain is not controlled on your discharge pain medications.  FOLLOW-UP:  1. Please call to make a follow-up appointment with your surgeon  within one week of discharge.  2. Please follow up with your primary care physician in one week regarding your hospitalization.

## 2023-03-01 NOTE — PROGRESS NOTE ADULT - SUBJECTIVE AND OBJECTIVE BOX
RED TEAM SURGERY PROGRESS NOTE:    SUBJECTIVE: Patient seen and examined at bedside with surgical team, patient with continued RUQ pain. Denies fever, chills, CP, SOB, nausea, vomiting.    OBJECTIVE:  Vital Signs Last 24 Hrs  T(C): 36.4 (01 Mar 2023 06:39), Max: 36.9 (2023 10:00)  T(F): 97.5 (01 Mar 2023 06:39), Max: 98.5 (2023 10:00)  HR: 78 (01 Mar 2023 06:39) (67 - 78)  BP: 134/73 (01 Mar 2023 06:39) (121/66 - 134/73)  RR: 18 (01 Mar 2023 06:39) (18 - 18)  SpO2: 93% (01 Mar 2023 06:39) (93% - 96%)  Parameters below as of 01 Mar 2023 06:39  Patient On (Oxygen Delivery Method): room air    I&O's Detail  2023 07:01  -  01 Mar 2023 07:00  IN:    Lactated Ringers: 900 mL    Oral Fluid: 60 mL  Total IN: 960 mL  OUT:    Voided (mL): 550 mL  Total OUT: 550 mL  Total NET: 410 mL    PHYSICAL EXAM:  Constitutional: A&Ox3, NAD, cooperative to examination.  Respiratory: CTA b/l, unlabored breathing, b/l equal chest rise.  Abdomen: Soft, non-distended, but moderately tender to palpation of RUQ. No rebound or guarding.  Extremities: WWP, EL spontaneously    LABS:             14.9   16.37 )-----------( 287      ( 2023 07:06 )             42.9         134<L>  |  97  |  12  ----------------------------<  180<H>  3.2<L>   |  22  |  0.78    Ca    9.5      2023 07:07  Phos  2.9       Mg     1.2         TPro  7.2  /  Alb  3.7  /  TBili  0.4  /  DBili  0.1  /  AST  9<L>  /  ALT  8<L>  /  AlkPhos  52      LIVER FUNCTIONS - ( 2023 07:07 )  Alb: 3.7 g/dL / Pro: 7.2 g/dL / ALK PHOS: 52 U/L / ALT: 8 U/L / AST: 9 U/L / GGT: x           Urinalysis Basic - ( 2023 20:13 )    Color: Yellow / Appearance: Clear / S.039 / pH: x  Gluc: x / Ketone: Trace  / Bili: Negative / Urobili: Negative   Blood: x / Protein: 100 mg/dl / Nitrite: Negative   Leuk Esterase: Negative / RBC: 7 /hpf / WBC 1 /HPF   Sq Epi: x / Non Sq Epi: 1 /hpf / Bacteria: Negative

## 2023-03-01 NOTE — BRIEF OPERATIVE NOTE - OPERATION/FINDINGS
Inflamed, distended, gangrenous gallbladder. Decompressed with needle, drained thick murky bile, sent for culture. +bile spillage  Critical view obtained  Small puncture in peritoneum/diaphragm, closed with v-loc suture  Intraoperative CXR shows no PTX

## 2023-03-01 NOTE — PROGRESS NOTE ADULT - SUBJECTIVE AND OBJECTIVE BOX
Patient is a 78y old  Male who presents with a chief complaint of acute cholecystitis (01 Mar 2023 12:45)      DATE OF SERVICE: 23 @ 14:25    SUBJECTIVE / OVERNIGHT EVENTS: overnight events noted  "I feel fine'     ROS:  Resp: No cough no sputum production  CVS: No chest pain no palpitations no orthopnea  GI: no N/V/D  : no dysuria, no hematuria          MEDICATIONS  (STANDING):  aspirin enteric coated 81 milliGRAM(s) Oral daily  atorvastatin 80 milliGRAM(s) Oral at bedtime  dextrose 5%. 1000 milliLiter(s) (50 mL/Hr) IV Continuous <Continuous>  dextrose 5%. 1000 milliLiter(s) (100 mL/Hr) IV Continuous <Continuous>  dextrose 50% Injectable 25 Gram(s) IV Push once  dextrose 50% Injectable 12.5 Gram(s) IV Push once  dextrose 50% Injectable 25 Gram(s) IV Push once  enoxaparin Injectable 40 milliGRAM(s) SubCutaneous every 24 hours  glucagon  Injectable 1 milliGRAM(s) IntraMuscular once  insulin lispro (ADMELOG) corrective regimen sliding scale   SubCutaneous every 6 hours  lactated ringers. 1000 milliLiter(s) (75 mL/Hr) IV Continuous <Continuous>  memantine 5 milliGRAM(s) Oral daily  oxybutynin 5 milliGRAM(s) Oral two times a day  piperacillin/tazobactam IVPB.. 3.375 Gram(s) IV Intermittent every 8 hours  sodium phosphate 15 milliMole(s)/250 mL IVPB 15 milliMole(s) IV Intermittent once    MEDICATIONS  (PRN):  dextrose Oral Gel 15 Gram(s) Oral once PRN Blood Glucose LESS THAN 70 milliGRAM(s)/deciliter  oxyCODONE    IR 2.5 milliGRAM(s) Oral every 4 hours PRN moderate and severe        CAPILLARY BLOOD GLUCOSE      POCT Blood Glucose.: 155 mg/dL (01 Mar 2023 12:11)  POCT Blood Glucose.: 156 mg/dL (01 Mar 2023 06:43)  POCT Blood Glucose.: 176 mg/dL (01 Mar 2023 00:12)  POCT Blood Glucose.: 158 mg/dL (2023 18:14)    I&O's Summary    2023 07:01  -  01 Mar 2023 07:00  --------------------------------------------------------  IN: 1820 mL / OUT: 550 mL / NET: 1270 mL        Vital Signs Last 24 Hrs  T(C): 36.9 (01 Mar 2023 14:12), Max: 36.9 (01 Mar 2023 14:12)  T(F): 98.4 (01 Mar 2023 14:12), Max: 98.4 (01 Mar 2023 14:12)  HR: 75 (01 Mar 2023 14:12) (69 - 78)  BP: 131/69 (01 Mar 2023 14:12) (121/69 - 147/69)  BP(mean): --  RR: 18 (01 Mar 2023 14:12) (18 - 18)  SpO2: 96% (01 Mar 2023 14:12) (93% - 96%)      PHYSICAL EXAM:  GENERAL: NAD  NECK: Supple  CHEST/LUNG: Clear  HEART: S1S2; no murmurs  ABDOMEN: Soft, positive RUQ tenderness   EXTREMITIES: no edema  PSYCH: alert at baseline   NEUROLOGY: no focal motor  SKIN: No rashes or lesions    LABS:                        14.9   20.84 )-----------( 281      ( 01 Mar 2023 07:40 )             43.3     03-01    135  |  98  |  14  ----------------------------<  163<H>  3.8   |  21<L>  |  0.87    Ca    9.6      01 Mar 2023 07:41  Phos  2.6     03-01  Mg     1.8     03-01    TPro  7.3  /  Alb  3.4  /  TBili  0.4  /  DBili  x   /  AST  18  /  ALT  11  /  AlkPhos  63  03-01    PT/INR - ( 01 Mar 2023 07:40 )   PT: 18.5 sec;   INR: 1.60 ratio         PTT - ( 01 Mar 2023 07:40 )  PTT:29.2 sec      Urinalysis Basic - ( 2023 20:13 )    Color: Yellow / Appearance: Clear / S.039 / pH: x  Gluc: x / Ketone: Trace  / Bili: Negative / Urobili: Negative   Blood: x / Protein: 100 mg/dl / Nitrite: Negative   Leuk Esterase: Negative / RBC: 7 /hpf / WBC 1 /HPF   Sq Epi: x / Non Sq Epi: 1 /hpf / Bacteria: Negative          All consultant(s) notes reviewed and care discussed with other providers        Contact Number, Dr Contreras [phone]

## 2023-03-01 NOTE — CONSULT NOTE ADULT - SUBJECTIVE AND OBJECTIVE BOX
"HPI:  78 year old male with PMH CABG, umbilical hernia repair with mesh, DM, HTN, HLD presents to the ED with 3 days of abdominal pain and diarrhea preceding, 1 episode of vomiting this morning. Denies fevers, chest pain, shortness of breath.     In ER, patient is HDS, abd ruq tenderness, WBC 11.9, Tbil/LFTs/lipase wnl, US showed cholelithiasis with wall thicken and perichole fluid.  (2023 00:23)"    Above reviewed. 79 yo M CABG, hernia repair, abd pain, diarrhea  Patient with abd pain, somewhat diffuse, vomiting  Here found to have suspicion for cholecystitis, planned for OR  At bedside, patient generally well appearing  No chest pain, no cough  No dysuria, but complains of frequent urination--states has overactive bladder  ID called for further eval    PAST MEDICAL & SURGICAL HISTORY:  Essential hypertension      Hypercholesterolemia      CAD (coronary artery disease)      History of pancreatitis  years ago      Type 2 diabetes mellitus      [place] (hard of hearing)      Poor historian      H/O hernia repair  bilateral      S/P CABG (coronary artery bypass graft)  x 3  15 years ago      History of bunionectomy of right great toe  4 years a go      S/P coronary artery stent placement  x 4    20  years ago      S/P LASIK surgery    Allergies    phenothiazines (Hives)  Plavix (Hives)    Intolerances    ANTIMICROBIALS:  piperacillin/tazobactam IVPB.. 3.375 every 8 hours    OTHER MEDS:  aspirin enteric coated 81 milliGRAM(s) Oral daily  atorvastatin 80 milliGRAM(s) Oral at bedtime  dextrose 5%. 1000 milliLiter(s) IV Continuous <Continuous>  dextrose 5%. 1000 milliLiter(s) IV Continuous <Continuous>  dextrose 50% Injectable 25 Gram(s) IV Push once  dextrose 50% Injectable 12.5 Gram(s) IV Push once  dextrose 50% Injectable 25 Gram(s) IV Push once  dextrose Oral Gel 15 Gram(s) Oral once PRN  enoxaparin Injectable 40 milliGRAM(s) SubCutaneous every 24 hours  glucagon  Injectable 1 milliGRAM(s) IntraMuscular once  insulin lispro (ADMELOG) corrective regimen sliding scale   SubCutaneous every 6 hours  lactated ringers. 1000 milliLiter(s) IV Continuous <Continuous>  memantine 5 milliGRAM(s) Oral daily  oxybutynin 5 milliGRAM(s) Oral two times a day  oxyCODONE    IR 2.5 milliGRAM(s) Oral every 4 hours PRN  sodium phosphate 15 milliMole(s)/250 mL IVPB 15 milliMole(s) IV Intermittent once    SOCIAL HISTORY: No tobacco, no alcohol, no illicit drugs    FAMILY HISTORY:  No pertinent family history in first degree relatives relating to chief complaint    Drug Dosing Weight  Height (cm): 165.1 (2023 11:55)  Weight (kg): 78 (2023 11:55)  BMI (kg/m2): 28.6 (2023 11:55)  BSA (m2): 1.86 (2023 11:55)    PE:    Vital Signs Last 24 Hrs  T(C): 36.6 (01 Mar 2023 10:06), Max: 36.6 (2023 22:08)  T(F): 97.8 (01 Mar 2023 10:06), Max: 97.9 (2023 22:08)  HR: 72 (01 Mar 2023 10:06) (69 - 78)  BP: 147/69 (01 Mar 2023 10:06) (121/69 - 147/69)  RR: 18 (01 Mar 2023 10:06) (18 - 18)  SpO2: 93% (01 Mar 2023 10:06) (93% - 96%)    Gen: AOx3, NAD, non-toxic, pleasant  CV: S1+S2 normal, nontachycardic  Resp: Clear bilat, no resp distress, no crackles/wheezes  Abd: Soft, nontender, +BS  Ext: No LE edema, no wounds  : No [place]  IV/Skin: No thrombophlebitis  Msk: No low back pain, no arthralgias, no joint swelling  Neuro: No sensory deficits, no motor deficits    LABS:                        14.9   20.84 )-----------( 281      ( 01 Mar 2023 07:40 )             43.3     03-    135  |  98  |  14  ----------------------------<  163<H>  3.8   |  21<L>  |  0.87    Ca    9.6      01 Mar 2023 07:41  Phos  2.6     03-  Mg     1.8     03-    TPro  7.3  /  Alb  3.4  /  TBili  0.4  /  DBili  x   /  AST  18  /  ALT  11  /  AlkPhos  63      Urinalysis Basic - ( 2023 20:13 )    Color: Yellow / Appearance: Clear / S.039 / pH: x  Gluc: x / Ketone: Trace  / Bili: Negative / Urobili: Negative   Blood: x / Protein: 100 mg/dl / Nitrite: Negative   Leuk Esterase: Negative / RBC: 7 /hpf / WBC 1 /HPF   Sq Epi: x / Non Sq Epi: 1 /hpf / Bacteria: Negative    MICROBIOLOGY:    COVID neg    RADIOLOGY:        IMPRESSION:    Distended gallbladder with wall thickening and echogenic stones/sludge   concerning for acute cholecystitis.    Minimal intrahepatic biliary ductal dilatation. Normal common bile duct   diameter.     CT:    IMPRESSION:  Distended gallbladder with cholelithiasis and gallbladder wall   thickening, raising concern for cholecystitis in the appropriate clinical   setting. Correlate with ultrasound/HIDA scan as clinically indicated.

## 2023-03-01 NOTE — PRE-ANESTHESIA EVALUATION ADULT - NSANTHPEFT_GEN_ALL_CORE
Airway: MP III  Teeth: missing teeth, no loose teeth; dental implants  Neck: FROM  CV: RRR  Lungs: CTAB

## 2023-03-01 NOTE — CONSULT NOTE ADULT - ASSESSMENT
77 yo M CABG, hernia repair, abd pain, diarrhea  Leukocytosis, no fever  CT/USG concerning for acute cholecystitis  LFTs WNL  UA neg  Concern for cholecystitis, planned for OR  Rising WBC--suspect due to ongoing cholecystitis process, but rule out alternate process (e.g. occult bacteremia?)  Overall, Acute cholecystitis, leukocytosis, abd pain  - Zosyn 3.375g q 8  - Check BCXs x2  - F/U UCX  - F/U surgery, planned for lap cholecystectomy today  - Trend WBC to normal  - Monitor for alternate sources    Ashkan Cleveland MD  Contact on TEAMS messaging from 9am - 5pm  From 5pm-9am, on weekends, or if no response call [phone]
78 year old male with PMH CABG, umbilical hernia repair with mesh, DM, HTN, HLD presents to the ED with 3 days of abdominal pain, clinical presentation consistent with acute adams

## 2023-03-01 NOTE — PRE-ANESTHESIA EVALUATION ADULT - NSRADCARDRESULTSFT_GEN_ALL_CORE
Patient name: TIAGO EAGLE  YOB: 1945   Age: 78 (M)   MR#: [id number]  Study Date: 2/28/2023  Location: [address] [place], [place] [id number]: Jose Michel [facility]  Study quality: Technically fair  Referring Physician: Paul Sanchez MD  Blood Pressure: 121/66 mmHg  Height: 165 cm  Weight: 78 kg  BSA: 1.9 m2  ------------------------------------------------------------------------  PROCEDURE: Transthoracic echocardiogram with 2-D, M-Mode  and complete spectral and color flow Doppler.  INDICATION:Hypertensive heart disease without heart  failure (I11.9)  ------------------------------------------------------------------------  Dimensions:    Normal Values:  LA:     4.0    2.0 - 4.0 cm  Ao:     3.3    2.0 - 3.8 cm  SEPTUM: 1.5    0.6 - 1.2 cm  PWT:    1.4    0.6 - 1.1 cm  LVIDd:  4.1    3.0 - 5.6 cm  LVIDs:  2.7    1.8 - 4.0 cm  Derived variables:  LVMI: 123 g/m2  RWT: 0.68  Fractional short: 34 %  EF (Ceballos Rule): 64 %Doppler Peak Velocity (m/sec):  AoV=1.4  ------------------------------------------------------------------------  Observations:  Mitral Valve: Normal mitral valve. Mild mitral  regurgitation.  Aortic Valve/Aorta: Calcified aortic valve. Peak  transaortic valve gradient equals 8 mm Hg. Minimal aortic  regurgitation.  Peak left ventricular outflow tract  gradient equals 5 mm Hg, mean gradient is equal to 2 mm Hg,  LVOT velocity time integral equals 20 cm.  Aortic Root: 3.3 cm.  LVOT diameter: 2.1 cm.  Left Atrium: Normal left atrium.  LA volume index = 31  cc/m2.  Left Ventricle: Normal left ventricular systolic function.  No segmental wall motion abnormalities. Moderate concentric  left ventricular hypertrophy. Mild diastolic dysfunction  (Stage I).  Right Heart: Normal right atrium. Normal right ventricular  size and function. Normal tricuspid valve. Minimal  tricuspid regurgitation. Normal pulmonic valve. Minimal  pulmonic regurgitation.  Pericardium/Pleura: Normal pericardium with no pericardial  effusion.  Hemodynamic: Estimated right atrial pressure is8 mm Hg.  ------------------------------------------------------------------------  Conclusions:  1. Moderate concentric left ventricular hypertrophy.  2. Normal left ventricular systolic function. No segmental  wall motion abnormalities.  3. Normal right ventricular size and function.  *** No previous Echo exam.

## 2023-03-02 LAB
ALBUMIN SERPL ELPH-MCNC: 2.8 G/DL — LOW (ref 3.3–5)
ALP SERPL-CCNC: 74 U/L — SIGNIFICANT CHANGE UP (ref 40–120)
ALT FLD-CCNC: 36 U/L — SIGNIFICANT CHANGE UP (ref 10–45)
ANION GAP SERPL CALC-SCNC: 17 MMOL/L — SIGNIFICANT CHANGE UP (ref 5–17)
AST SERPL-CCNC: 57 U/L — HIGH (ref 10–40)
BILIRUB SERPL-MCNC: 0.2 MG/DL — SIGNIFICANT CHANGE UP (ref 0.2–1.2)
BUN SERPL-MCNC: 18 MG/DL — SIGNIFICANT CHANGE UP (ref 7–23)
CALCIUM SERPL-MCNC: 8.8 MG/DL — SIGNIFICANT CHANGE UP (ref 8.4–10.5)
CHLORIDE SERPL-SCNC: 98 MMOL/L — SIGNIFICANT CHANGE UP (ref 96–108)
CO2 SERPL-SCNC: 22 MMOL/L — SIGNIFICANT CHANGE UP (ref 22–31)
CREAT SERPL-MCNC: 0.93 MG/DL — SIGNIFICANT CHANGE UP (ref 0.5–1.3)
EGFR: 85 ML/MIN/1.73M2 — SIGNIFICANT CHANGE UP
GLUCOSE BLDC GLUCOMTR-MCNC: 169 MG/DL — HIGH (ref 70–99)
GLUCOSE BLDC GLUCOMTR-MCNC: 239 MG/DL — HIGH (ref 70–99)
GLUCOSE BLDC GLUCOMTR-MCNC: 249 MG/DL — HIGH (ref 70–99)
GLUCOSE BLDC GLUCOMTR-MCNC: 254 MG/DL — HIGH (ref 70–99)
GLUCOSE SERPL-MCNC: 200 MG/DL — HIGH (ref 70–99)
GRAM STN FLD: SIGNIFICANT CHANGE UP
HCT VFR BLD CALC: 41.6 % — SIGNIFICANT CHANGE UP (ref 39–50)
HGB BLD-MCNC: 14 G/DL — SIGNIFICANT CHANGE UP (ref 13–17)
MAGNESIUM SERPL-MCNC: 2.1 MG/DL — SIGNIFICANT CHANGE UP (ref 1.6–2.6)
MCHC RBC-ENTMCNC: 28.5 PG — SIGNIFICANT CHANGE UP (ref 27–34)
MCHC RBC-ENTMCNC: 33.7 GM/DL — SIGNIFICANT CHANGE UP (ref 32–36)
MCV RBC AUTO: 84.6 FL — SIGNIFICANT CHANGE UP (ref 80–100)
NRBC # BLD: 0 /100 WBCS — SIGNIFICANT CHANGE UP (ref 0–0)
PHOSPHATE SERPL-MCNC: 3.3 MG/DL — SIGNIFICANT CHANGE UP (ref 2.5–4.5)
PLATELET # BLD AUTO: 269 K/UL — SIGNIFICANT CHANGE UP (ref 150–400)
POTASSIUM SERPL-MCNC: 3.6 MMOL/L — SIGNIFICANT CHANGE UP (ref 3.5–5.3)
POTASSIUM SERPL-SCNC: 3.6 MMOL/L — SIGNIFICANT CHANGE UP (ref 3.5–5.3)
PROT SERPL-MCNC: 6.6 G/DL — SIGNIFICANT CHANGE UP (ref 6–8.3)
RBC # BLD: 4.92 M/UL — SIGNIFICANT CHANGE UP (ref 4.2–5.8)
RBC # FLD: 13.3 % — SIGNIFICANT CHANGE UP (ref 10.3–14.5)
SODIUM SERPL-SCNC: 137 MMOL/L — SIGNIFICANT CHANGE UP (ref 135–145)
SPECIMEN SOURCE: SIGNIFICANT CHANGE UP
WBC # BLD: 18.65 K/UL — HIGH (ref 3.8–10.5)
WBC # FLD AUTO: 18.65 K/UL — HIGH (ref 3.8–10.5)

## 2023-03-02 PROCEDURE — 99232 SBSQ HOSP IP/OBS MODERATE 35: CPT

## 2023-03-02 PROCEDURE — 71045 X-RAY EXAM CHEST 1 VIEW: CPT | Mod: 26

## 2023-03-02 RX ORDER — OXYCODONE HYDROCHLORIDE 5 MG/1
5 TABLET ORAL EVERY 4 HOURS
Refills: 0 | Status: DISCONTINUED | OUTPATIENT
Start: 2023-03-02 | End: 2023-03-02

## 2023-03-02 RX ORDER — TRAMADOL HYDROCHLORIDE 50 MG/1
25 TABLET ORAL EVERY 6 HOURS
Refills: 0 | Status: DISCONTINUED | OUTPATIENT
Start: 2023-03-02 | End: 2023-03-06

## 2023-03-02 RX ORDER — OXYCODONE HYDROCHLORIDE 5 MG/1
2.5 TABLET ORAL EVERY 4 HOURS
Refills: 0 | Status: DISCONTINUED | OUTPATIENT
Start: 2023-03-02 | End: 2023-03-02

## 2023-03-02 RX ORDER — TRAMADOL HYDROCHLORIDE 50 MG/1
50 TABLET ORAL EVERY 6 HOURS
Refills: 0 | Status: DISCONTINUED | OUTPATIENT
Start: 2023-03-02 | End: 2023-03-06

## 2023-03-02 RX ADMIN — Medication 81 MILLIGRAM(S): at 12:30

## 2023-03-02 RX ADMIN — Medication 975 MILLIGRAM(S): at 00:28

## 2023-03-02 RX ADMIN — Medication 2: at 14:36

## 2023-03-02 RX ADMIN — OXYCODONE HYDROCHLORIDE 5 MILLIGRAM(S): 5 TABLET ORAL at 06:20

## 2023-03-02 RX ADMIN — Medication 2: at 21:55

## 2023-03-02 RX ADMIN — Medication 975 MILLIGRAM(S): at 06:20

## 2023-03-02 RX ADMIN — Medication 975 MILLIGRAM(S): at 05:21

## 2023-03-02 RX ADMIN — PIPERACILLIN AND TAZOBACTAM 25 GRAM(S): 4; .5 INJECTION, POWDER, LYOPHILIZED, FOR SOLUTION INTRAVENOUS at 08:25

## 2023-03-02 RX ADMIN — ATORVASTATIN CALCIUM 80 MILLIGRAM(S): 80 TABLET, FILM COATED ORAL at 21:56

## 2023-03-02 RX ADMIN — OXYCODONE HYDROCHLORIDE 5 MILLIGRAM(S): 5 TABLET ORAL at 12:29

## 2023-03-02 RX ADMIN — Medication 975 MILLIGRAM(S): at 12:29

## 2023-03-02 RX ADMIN — Medication 5 MILLIGRAM(S): at 05:23

## 2023-03-02 RX ADMIN — PIPERACILLIN AND TAZOBACTAM 25 GRAM(S): 4; .5 INJECTION, POWDER, LYOPHILIZED, FOR SOLUTION INTRAVENOUS at 22:32

## 2023-03-02 RX ADMIN — MEMANTINE HYDROCHLORIDE 5 MILLIGRAM(S): 10 TABLET ORAL at 12:29

## 2023-03-02 RX ADMIN — PIPERACILLIN AND TAZOBACTAM 25 GRAM(S): 4; .5 INJECTION, POWDER, LYOPHILIZED, FOR SOLUTION INTRAVENOUS at 14:37

## 2023-03-02 RX ADMIN — Medication 1: at 08:24

## 2023-03-02 RX ADMIN — OXYCODONE HYDROCHLORIDE 5 MILLIGRAM(S): 5 TABLET ORAL at 05:20

## 2023-03-02 RX ADMIN — ENOXAPARIN SODIUM 40 MILLIGRAM(S): 100 INJECTION SUBCUTANEOUS at 05:22

## 2023-03-02 RX ADMIN — Medication 975 MILLIGRAM(S): at 18:03

## 2023-03-02 RX ADMIN — TRAMADOL HYDROCHLORIDE 25 MILLIGRAM(S): 50 TABLET ORAL at 23:10

## 2023-03-02 NOTE — PROGRESS NOTE ADULT - SUBJECTIVE AND OBJECTIVE BOX
Red Team (General Surgery) Daily Progress Note    SUBJECTIVE:  Pt seen and examined, and is resting comfortably in bed. Pt reports moderate RUQ pain. Pt denies nausea or emesis. Pt reports tolerating the procedure well.     OBJECTIVE:  Vital Signs Last 24 Hrs  T(C): 36.6 (02 Mar 2023 05:22), Max: 37.1 (02 Mar 2023 01:30)  T(F): 97.9 (02 Mar 2023 05:22), Max: 98.8 (02 Mar 2023 01:30)  HR: 78 (02 Mar 2023 05:22) (72 - 88)  BP: 139/72 (02 Mar 2023 05:22) (101/63 - 167/90)  BP(mean): 104 (01 Mar 2023 21:45) (92 - 120)  RR: 18 (02 Mar 2023 05:22) (18 - 20)  SpO2: 94% (02 Mar 2023 05:22) (93% - 100%)    Parameters below as of 02 Mar 2023 05:22  Patient On (Oxygen Delivery Method): room air        I&O's Detail    01 Mar 2023 07:01  -  02 Mar 2023 07:00  --------------------------------------------------------  IN:    Oral Fluid: 120 mL  Total IN: 120 mL    OUT:    Blood Loss (mL): 10 mL    Bulb (mL): 2 mL    Voided (mL): 100 mL  Total OUT: 112 mL    Total NET: 8 mL        Exam:  General: NAD, resting comfortably in bed  Neuro: A/O x 3, no focal deficits  Pulmonary: Nonlabored breathing  Abdominal: softly distended, port sites cdi, RUQ TTP  Incision: C/D/I   Drains: JPx1 c dark ss output in bulb  Extremities: WWP                        14.0   18.65 )-----------( 269      ( 02 Mar 2023 07:20 )             41.6       03-02    137  |  98  |  18  ----------------------------<  200<H>  3.6   |  22  |  0.93    Ca    8.8      02 Mar 2023 07:19  Phos  3.3     03-02  Mg     2.1     03-02    TPro  6.6  /  Alb  2.8<L>  /  TBili  0.2  /  DBili  x   /  AST  57<H>  /  ALT  36  /  AlkPhos  74  03-02      PT/INR - ( 01 Mar 2023 07:40 )   PT: 18.5 sec;   INR: 1.60 ratio         PTT - ( 01 Mar 2023 07:40 )  PTT:29.2 sec    ASSESSMENT:  78M w/PMH of HTN. HLD, DM2, CABG, previous umbilical hernia repair with mesh, p/w acute cholecystitis. WBC 12k on admission, most recently 16k; LFTs WNL; ultrasound demonstrating distended GB with 5mm wall, 6mm CBD. Pt is s/p laparoscopic cholecystectomy on 3/1.    PLAN:    - Diet: regular diet  - f/u AM CXR  - Drain Teaching  - Pain Control  - Activity: OOB/ambulation  - Incentive spirometry  - c/w Zosyn  - DVT prophylaxis  - Dispo planning        Red Surgery  [id number]

## 2023-03-02 NOTE — PHYSICAL THERAPY INITIAL EVALUATION ADULT - NSPTDMEREC_GEN_A_CORE
pt. will require RW 2/2 impaired static/dynamic balance/rolling walker
Pt will require R/W for discharge due to impaired balance while ambulating./rolling walker

## 2023-03-02 NOTE — PHYSICAL THERAPY INITIAL EVALUATION ADULT - DIAGNOSIS, PT EVAL
Decreased functional mobility/capacity secondary to impairments listed below
Decreased functional mobility/capacity

## 2023-03-02 NOTE — PHYSICAL THERAPY INITIAL EVALUATION ADULT - STRENGTHENING, PT EVAL
GOAL: Patient will demonstrate a 1/3 increase in strength where deficient to assist with performing functional mobility and ADLs.
GOAL: Patient will demonstrate a 1/3 increase in strength where deficient to assist with performing functional mobility and ADLs.

## 2023-03-02 NOTE — PHYSICAL THERAPY INITIAL EVALUATION ADULT - ADDITIONAL COMMENTS
Patient lives with his wife in a 2 story house w/5 steps w/BL HR to enter; pt. has chair lift to negotiate one flight of stairs to reach bedroom; pt. reports independence in ADLs using SC; as per family present at the bedside, pt. has hx of multiple fall 2/2 LOB
Patient lives with his wife in a 2 story house w/5 steps w/BL HR to enter; pt. has chair lift to negotiate one flight of stairs to reach bedroom; pt. reports independence in ADLs using SC; as per family present at the bedside, pt. has hx of multiple fall 2/2 LOB

## 2023-03-02 NOTE — PHYSICAL THERAPY INITIAL EVALUATION ADULT - TRANSFER TRAINING, PT EVAL
GOAL: Patient will perform sit to stand transfers independently at rolling walker with proper hand placement
GOAL: Patient will perform sit to stand transfers independently at rolling walker with proper hand placement

## 2023-03-02 NOTE — PHYSICAL THERAPY INITIAL EVALUATION ADULT - BALANCE TRAINING, PT EVAL
GOAL: Patient will demonstrate an increase in static/dynamic balance in sitting/standing where deficient by at least 1 grade to facilitate greater independence during functional mobility and ADL's.
GOAL: Patient will demonstrate an increase in static/dynamic balance in sitting/standing where deficient by at least 1 grade to facilitate greater independence during functional mobility and ADL's.

## 2023-03-02 NOTE — PHYSICAL THERAPY INITIAL EVALUATION ADULT - TRANSFER SAFETY CONCERNS NOTED: SIT/STAND, REHAB EVAL
decreased balance during turns/decreased safety awareness/losing balance/decreased sequencing ability/decreased step length/decreased weight-shifting ability
losing balance

## 2023-03-02 NOTE — PHYSICAL THERAPY INITIAL EVALUATION ADULT - PLANNED THERAPY INTERVENTIONS, PT EVAL
GOAL: Patient will be able to negotiate 5 steps in 2 weeks/balance training/bed mobility training/gait training/strengthening/transfer training
GOAL: Patient will be able to negotiate 5 steps in 2 weeks/balance training/bed mobility training/gait training/strengthening/transfer training

## 2023-03-02 NOTE — PHYSICAL THERAPY INITIAL EVALUATION ADULT - STANDING BALANCE: STATIC
with R/W/fair plus
unable to hold unsupported standing position w/feet [id number]; demonstrates postural sway w/LOB/fair balance

## 2023-03-02 NOTE — PROGRESS NOTE ADULT - SUBJECTIVE AND OBJECTIVE BOX
Patient is a 78y old  Male who presents with a chief complaint of acute cholecystitis (02 Mar 2023 08:13)      DATE OF SERVICE: 23 @ 13:45    SUBJECTIVE / OVERNIGHT EVENTS: overnight events noted    ROS:  Resp: No cough no sputum production  CVS: No chest pain no palpitations no orthopnea  GI: no N/V/D  : no dysuria, no hematuria  c/o pain right belly      MEDICATIONS  (STANDING):  acetaminophen     Tablet .. 975 milliGRAM(s) Oral every 6 hours  aspirin enteric coated 81 milliGRAM(s) Oral daily  atorvastatin 80 milliGRAM(s) Oral at bedtime  dextrose 50% Injectable 25 Gram(s) IV Push once  dextrose 50% Injectable 12.5 Gram(s) IV Push once  dextrose 50% Injectable 25 Gram(s) IV Push once  enoxaparin Injectable 40 milliGRAM(s) SubCutaneous every 24 hours  glucagon  Injectable 1 milliGRAM(s) IntraMuscular once  insulin lispro (ADMELOG) corrective regimen sliding scale   SubCutaneous Before meals and at bedtime  memantine 5 milliGRAM(s) Oral daily  oxybutynin 5 milliGRAM(s) Oral two times a day  piperacillin/tazobactam IVPB.. 3.375 Gram(s) IV Intermittent every 8 hours    MEDICATIONS  (PRN):  dextrose Oral Gel 15 Gram(s) Oral once PRN Blood Glucose LESS THAN 70 milliGRAM(s)/deciliter  oxyCODONE    IR 2.5 milliGRAM(s) Oral every 4 hours PRN Moderate Pain (4 - 6)  oxyCODONE    IR 5 milliGRAM(s) Oral every 4 hours PRN Severe Pain (7 - 10)        CAPILLARY BLOOD GLUCOSE      POCT Blood Glucose.: 169 mg/dL (02 Mar 2023 07:32)  POCT Blood Glucose.: 188 mg/dL (01 Mar 2023 20:31)    I&O's Summary    01 Mar 2023 07:  -  02 Mar 2023 07:00  --------------------------------------------------------  IN: 120 mL / OUT: 112 mL / NET: 8 mL    02 Mar 2023 07:01  -  02 Mar 2023 13:45  --------------------------------------------------------  IN: 180 mL / OUT: 125 mL / NET: 55 mL        Vital Signs Last 24 Hrs  T(C): 36.6 (02 Mar 2023 05:22), Max: 37.1 (02 Mar 2023 01:30)  T(F): 97.9 (02 Mar 2023 05:22), Max: 98.8 (02 Mar 2023 01:30)  HR: 78 (02 Mar 2023 05:22) (75 - 88)  BP: 139/72 (02 Mar 2023 05:22) (101/63 - 167/90)  BP(mean): 104 (01 Mar 2023 21:45) (92 - 120)  RR: 18 (02 Mar 2023 05:22) (18 - 20)  SpO2: 94% (02 Mar 2023 05:22) (93% - 100%)    PHYSICAL EXAM:  GENERAL: NAD  NECK: Supple  CHEST/LUNG: Clear  HEART: S1S2; no murmurs  ABDOMEN: Soft, appropriate tenderness   EXTREMITIES: no edema  PSYCH: alert at baseline   NEUROLOGY: no focal motor  SKIN: No rashes or lesions    LABS:                        14.0   18.65 )-----------( 269      ( 02 Mar 2023 07:20 )             41.6     03-02    137  |  98  |  18  ----------------------------<  200<H>  3.6   |  22  |  0.93    Ca    8.8      02 Mar 2023 07:19  Phos  3.3     03-02  Mg     2.1     03-02    TPro  6.6  /  Alb  2.8<L>  /  TBili  0.2  /  DBili  x   /  AST  57<H>  /  ALT  36  /  AlkPhos  74  03-02    PT/INR - ( 01 Mar 2023 07:40 )   PT: 18.5 sec;   INR: 1.60 ratio         PTT - ( 01 Mar 2023 07:40 )  PTT:29.2 sec      Urinalysis Basic - ( 2023 20:13 )    Color: Yellow / Appearance: Clear / S.039 / pH: x  Gluc: x / Ketone: Trace  / Bili: Negative / Urobili: Negative   Blood: x / Protein: 100 mg/dl / Nitrite: Negative   Leuk Esterase: Negative / RBC: 7 /hpf / WBC 1 /HPF   Sq Epi: x / Non Sq Epi: 1 /hpf / Bacteria: Negative          All consultant(s) notes reviewed and care discussed with other providers        Contact Number, Dr Contreras [phone]

## 2023-03-02 NOTE — PHYSICAL THERAPY INITIAL EVALUATION ADULT - GAIT TRAINING, PT EVAL
GOAL: Patient will ambulate 300 feet independently with RW
GOAL: Patient will ambulate 300 feet independently with RW

## 2023-03-02 NOTE — CHART NOTE - NSCHARTNOTEFT_GEN_A_CORE
Surgery Post-Op Note    Pre-Op Dx: Acute cholecystitis     Procedure: Laparoscopic cholecystectomy    Surgeon: Laura    SUBJECTIVE:  Pt seen and examined at the bedside. Endorses pain, denies n/v. Brit CLD    OBJECTIVE:  Vital Signs Last 24 Hrs  T(C): 36.8 (02 Mar 2023 00:38), Max: 36.9 (01 Mar 2023 14:12)  T(F): 98.3 (02 Mar 2023 00:38), Max: 98.4 (01 Mar 2023 14:12)  HR: 80 (02 Mar 2023 00:38) (72 - 88)  BP: 149/74 (02 Mar 2023 00:38) (101/63 - 167/90)  BP(mean): 104 (01 Mar 2023 21:45) (92 - 120)  RR: 18 (02 Mar 2023 00:38) (18 - 20)  SpO2: 93% (02 Mar 2023 00:38) (93% - 100%)    Parameters below as of 02 Mar 2023 00:38  Patient On (Oxygen Delivery Method): room air        Physical Exam:  General: NAD, resting comfortably in bed  Neuro: A/O x 3, no focal deficits  Pulmonary: Nonlabored breathing  Abdominal: softly distended, port sites cdi, RUQ TTP  Incision: C/D/I   Drains: JPx1 c dark ss output in bulb  Extremities: WWP    LABS:                        14.9   20.84 )-----------( 281      ( 01 Mar 2023 07:40 )             43.3     03-01    135  |  98  |  14  ----------------------------<  163<H>  3.8   |  21<L>  |  0.87    Ca    9.6      01 Mar 2023 07:41  Phos  2.6     03-01  Mg     1.8     03-01    TPro  7.3  /  Alb  3.4  /  TBili  0.4  /  DBili  x   /  AST  18  /  ALT  11  /  AlkPhos  63  03-01    PT/INR - ( 01 Mar 2023 07:40 )   PT: 18.5 sec;   INR: 1.60 ratio         PTT - ( 01 Mar 2023 07:40 )  PTT:29.2 sec  CAPILLARY BLOOD GLUCOSE      POCT Blood Glucose.: 188 mg/dL (01 Mar 2023 20:31)  POCT Blood Glucose.: 155 mg/dL (01 Mar 2023 12:11)  POCT Blood Glucose.: 156 mg/dL (01 Mar 2023 06:43)    Urinalysis Basic - ( 2023 20:13 )    Color: Yellow / Appearance: Clear / S.039 / pH: x  Gluc: x / Ketone: Trace  / Bili: Negative / Urobili: Negative   Blood: x / Protein: 100 mg/dl / Nitrite: Negative   Leuk Esterase: Negative / RBC: 7 /hpf / WBC 1 /HPF   Sq Epi: x / Non Sq Epi: 1 /hpf / Bacteria: Negative      LIVER FUNCTIONS - ( 01 Mar 2023 07:41 )  Alb: 3.4 g/dL / Pro: 7.3 g/dL / ALK PHOS: 63 U/L / ALT: 11 U/L / AST: 18 U/L / GGT: x           ABO Interpretation: O ( @ 08:33)      IMAGING:    ASSESSMENT:78y Male now 4hours s/p lap adams    PLAN:  - drain teaching  - pain control  - oob/amb  - adv diet Surgery Post-Op Note    Pre-Op Dx: Acute cholecystitis     Procedure: Laparoscopic cholecystectomy    Surgeon: Laura    SUBJECTIVE:  Pt seen and examined at the bedside. Endorses pain, denies n/v. Brit CLD    OBJECTIVE:  Vital Signs Last 24 Hrs  T(C): 36.8 (02 Mar 2023 00:38), Max: 36.9 (01 Mar 2023 14:12)  T(F): 98.3 (02 Mar 2023 00:38), Max: 98.4 (01 Mar 2023 14:12)  HR: 80 (02 Mar 2023 00:38) (72 - 88)  BP: 149/74 (02 Mar 2023 00:38) (101/63 - 167/90)  BP(mean): 104 (01 Mar 2023 21:45) (92 - 120)  RR: 18 (02 Mar 2023 00:38) (18 - 20)  SpO2: 93% (02 Mar 2023 00:38) (93% - 100%)    Parameters below as of 02 Mar 2023 00:38  Patient On (Oxygen Delivery Method): room air        Physical Exam:  General: NAD, resting comfortably in bed  Neuro: A/O x 3, no focal deficits  Pulmonary: Nonlabored breathing  Abdominal: softly distended, port sites cdi, RUQ TTP  Incision: C/D/I   Drains: JPx1 c dark ss output in bulb  Extremities: WWP    LABS:                        14.9   20.84 )-----------( 281      ( 01 Mar 2023 07:40 )             43.3     03-01    135  |  98  |  14  ----------------------------<  163<H>  3.8   |  21<L>  |  0.87    Ca    9.6      01 Mar 2023 07:41  Phos  2.6     03-01  Mg     1.8     03-01    TPro  7.3  /  Alb  3.4  /  TBili  0.4  /  DBili  x   /  AST  18  /  ALT  11  /  AlkPhos  63  03-01    PT/INR - ( 01 Mar 2023 07:40 )   PT: 18.5 sec;   INR: 1.60 ratio         PTT - ( 01 Mar 2023 07:40 )  PTT:29.2 sec  CAPILLARY BLOOD GLUCOSE      POCT Blood Glucose.: 188 mg/dL (01 Mar 2023 20:31)  POCT Blood Glucose.: 155 mg/dL (01 Mar 2023 12:11)  POCT Blood Glucose.: 156 mg/dL (01 Mar 2023 06:43)    Urinalysis Basic - ( 2023 20:13 )    Color: Yellow / Appearance: Clear / S.039 / pH: x  Gluc: x / Ketone: Trace  / Bili: Negative / Urobili: Negative   Blood: x / Protein: 100 mg/dl / Nitrite: Negative   Leuk Esterase: Negative / RBC: 7 /hpf / WBC 1 /HPF   Sq Epi: x / Non Sq Epi: 1 /hpf / Bacteria: Negative      LIVER FUNCTIONS - ( 01 Mar 2023 07:41 )  Alb: 3.4 g/dL / Pro: 7.3 g/dL / ALK PHOS: 63 U/L / ALT: 11 U/L / AST: 18 U/L / GGT: x           ABO Interpretation: O ( @ 08:33)      IMAGING:    ASSESSMENT:78y Male now 4hours s/p lap adams    PLAN:  - drain teaching  - pain control  - oob/amb  - adv diet  - cont IV zosyn

## 2023-03-02 NOTE — PHYSICAL THERAPY INITIAL EVALUATION ADULT - BALANCE DISTURBANCE, IDENTIFIED IMPAIRMENT CONTRIBUTE, REHAB EVAL
pain/impaired postural control/decreased strength
impaired coordination/impaired postural control/decreased strength

## 2023-03-02 NOTE — PHYSICAL THERAPY INITIAL EVALUATION ADULT - GAIT DEVIATIONS NOTED, PT EVAL
decreased temitope/increased time in double stance/decreased step length/decreased stride length/decreased weight-shifting ability

## 2023-03-02 NOTE — PHYSICAL THERAPY INITIAL EVALUATION ADULT - GENERAL OBSERVATIONS, REHAB EVAL
Pt. rec'd sitting in bedside chair in NAD, VSS, IV, family at b/s, agreeable to PT
Pt. rec'd in bed, NAD, +IV, family at the bedside; agreeable to PT heather.

## 2023-03-02 NOTE — PROGRESS NOTE ADULT - SUBJECTIVE AND OBJECTIVE BOX
CC: Cholecystitis    Saw/spoke to patient. No fevers, no chills. No new complaints. Doing well. S/p OR.    Allergies  phenothiazines (Hives)  Plavix (Hives)    ANTIMICROBIALS:  piperacillin/tazobactam IVPB.. 3.375 every 8 hours    PE:    Vital Signs Last 24 Hrs  T(C): 36.3 (02 Mar 2023 14:10), Max: 37.1 (02 Mar 2023 01:30)  T(F): 97.3 (02 Mar 2023 14:10), Max: 98.8 (02 Mar 2023 01:30)  HR: 76 (02 Mar 2023 14:10) (75 - 88)  BP: 110/69 (02 Mar 2023 14:10) (101/63 - 167/90)  BP(mean): 104 (01 Mar 2023 21:45) (92 - 120)  RR: 18 (02 Mar 2023 14:10) (18 - 20)  SpO2: 95% (02 Mar 2023 14:10) (93% - 100%)    Gen: AOx3, NAD, non-toxic  CV: Nontachycardic  Resp: Breathing comfortably, RA  Abd: Soft, nontender  IV/Skin: No thrombophlebitis    LABS:                        14.0   18.65 )-----------( 269      ( 02 Mar 2023 07:20 )             41.6     03-02    137  |  98  |  18  ----------------------------<  200<H>  3.6   |  22  |  0.93    Ca    8.8      02 Mar 2023 07:19  Phos  3.3     03-02  Mg     2.1     03-02    TPro  6.6  /  Alb  2.8<L>  /  TBili  0.2  /  DBili  x   /  AST  57<H>  /  ALT  36  /  AlkPhos  74  03-02    Urinalysis Basic - ( 2023 20:13 )    Color: Yellow / Appearance: Clear / S.039 / pH: x  Gluc: x / Ketone: Trace  / Bili: Negative / Urobili: Negative   Blood: x / Protein: 100 mg/dl / Nitrite: Negative   Leuk Esterase: Negative / RBC: 7 /hpf / WBC 1 /HPF   Sq Epi: x / Non Sq Epi: 1 /hpf / Bacteria: Negative    MICROBIOLOGY:    Bile Bile Fluid  23 --  --    polymorphonuclear leukocytes seen  Gram Variable Rods seen  by cytocentrifuge    Clean Catch Clean Catch (Midstream)  23   <10,000 CFU/mL Normal Urogenital Bela  --  --    RADIOLOGY:    3/2 XR:    IMPRESSION:  No pneumothorax.    Unchanged right basilar atelectasis.

## 2023-03-02 NOTE — PHYSICAL THERAPY INITIAL EVALUATION ADULT - NSPTDISCHREC_GEN_A_CORE
strength and balance training/Outpatient PT
Change in D/C recommendation to Subacute rehab. If D/C'd home will require assist with ALL functional mobility and Home PT services./Sub-acute Rehab

## 2023-03-02 NOTE — PHYSICAL THERAPY INITIAL EVALUATION ADULT - PERTINENT HX OF CURRENT PROBLEM, REHAB EVAL
78 year old male with PMH CABG, umbilical hernia repair with mesh, DM, HTN, HLD. HPI: presents to the ED with 3 days of abdominal pain and diarrhea preceding, 1 episode of vomiting this morning. Denies fevers, chest pain, shortness of breath. Hospital course: In ER, patient is HDS, abd ruq tenderness, WBC 11.9, Tbil/LFTs/lipase wnl, US showed cholelithiasis with wall thicken and perichole fluid collection. 2/27 POCUS retroperitoneal: Normal abdominal aorta. 2/27 CT Abd/pelvis: Distended gallbladder with cholelithiasis and gallbladder wall thickening, raising concernfor cholecystitis in the appropriate clinical   setting. 2/27 US abdomen RT upper quadrant: Distended gallbladder with wall thickening and echogenic stones/sludge concerning for acute cholecystitis.
78 year old male with PMH CABG, umbilical hernia repair with mesh, DM, HTN, HLD. HPI: presents to the ED with 3 days of abdominal pain and diarrhea preceding, 1 episode of vomiting this morning. Denies fevers, chest pain, shortness of breath. Hospital course: In ER, patient is HDS, abd ruq tenderness, WBC 11.9, Tbil/LFTs/lipase wnl, US showed cholelithiasis with wall thicken and perichole fluid collection. 2/27 POCUS retroperitoneal: Normal abdominal aorta. 2/27 CT Abd/pelvis: Distended gallbladder with cholelithiasis and gallbladder wall thickening, raising concernfor cholecystitis in the appropriate clinical   setting. 2/27 US abdomen RT upper quadrant: Distended gallbladder with wall thickening and echogenic stones/sludge concerning for acute cholecystitis. Pt now s/p Laparoscopic cholecystectomy on 3/1. CXR 3/1: No pneumothorax. Unchanged right basilar atelectasis.

## 2023-03-03 DIAGNOSIS — K81.0 ACUTE CHOLECYSTITIS: ICD-10-CM

## 2023-03-03 LAB
-  AMIKACIN: SIGNIFICANT CHANGE UP
-  AMOXICILLIN/CLAVULANIC ACID: SIGNIFICANT CHANGE UP
-  AMPICILLIN/SULBACTAM: SIGNIFICANT CHANGE UP
-  AMPICILLIN: SIGNIFICANT CHANGE UP
-  AZTREONAM: SIGNIFICANT CHANGE UP
-  CEFAZOLIN: SIGNIFICANT CHANGE UP
-  CEFEPIME: SIGNIFICANT CHANGE UP
-  CEFOXITIN: SIGNIFICANT CHANGE UP
-  CEFTRIAXONE: SIGNIFICANT CHANGE UP
-  CIPROFLOXACIN: SIGNIFICANT CHANGE UP
-  ERTAPENEM: SIGNIFICANT CHANGE UP
-  GENTAMICIN: SIGNIFICANT CHANGE UP
-  IMIPENEM: SIGNIFICANT CHANGE UP
-  LEVOFLOXACIN: SIGNIFICANT CHANGE UP
-  MEROPENEM: SIGNIFICANT CHANGE UP
-  PIPERACILLIN/TAZOBACTAM: SIGNIFICANT CHANGE UP
-  TOBRAMYCIN: SIGNIFICANT CHANGE UP
-  TRIMETHOPRIM/SULFAMETHOXAZOLE: SIGNIFICANT CHANGE UP
ALBUMIN SERPL ELPH-MCNC: 2.7 G/DL — LOW (ref 3.3–5)
ALP SERPL-CCNC: 82 U/L — SIGNIFICANT CHANGE UP (ref 40–120)
ALT FLD-CCNC: 19 U/L — SIGNIFICANT CHANGE UP (ref 10–45)
ANION GAP SERPL CALC-SCNC: 11 MMOL/L — SIGNIFICANT CHANGE UP (ref 5–17)
AST SERPL-CCNC: 13 U/L — SIGNIFICANT CHANGE UP (ref 10–40)
BILIRUB SERPL-MCNC: 0.2 MG/DL — SIGNIFICANT CHANGE UP (ref 0.2–1.2)
BUN SERPL-MCNC: 22 MG/DL — SIGNIFICANT CHANGE UP (ref 7–23)
CALCIUM SERPL-MCNC: 9 MG/DL — SIGNIFICANT CHANGE UP (ref 8.4–10.5)
CHLORIDE SERPL-SCNC: 97 MMOL/L — SIGNIFICANT CHANGE UP (ref 96–108)
CO2 SERPL-SCNC: 25 MMOL/L — SIGNIFICANT CHANGE UP (ref 22–31)
CREAT SERPL-MCNC: 0.91 MG/DL — SIGNIFICANT CHANGE UP (ref 0.5–1.3)
EGFR: 87 ML/MIN/1.73M2 — SIGNIFICANT CHANGE UP
GLUCOSE BLDC GLUCOMTR-MCNC: 159 MG/DL — HIGH (ref 70–99)
GLUCOSE BLDC GLUCOMTR-MCNC: 190 MG/DL — HIGH (ref 70–99)
GLUCOSE BLDC GLUCOMTR-MCNC: 202 MG/DL — HIGH (ref 70–99)
GLUCOSE BLDC GLUCOMTR-MCNC: 213 MG/DL — HIGH (ref 70–99)
GLUCOSE SERPL-MCNC: 220 MG/DL — HIGH (ref 70–99)
HCT VFR BLD CALC: 40.4 % — SIGNIFICANT CHANGE UP (ref 39–50)
HGB BLD-MCNC: 13.4 G/DL — SIGNIFICANT CHANGE UP (ref 13–17)
MAGNESIUM SERPL-MCNC: 2 MG/DL — SIGNIFICANT CHANGE UP (ref 1.6–2.6)
MCHC RBC-ENTMCNC: 28.4 PG — SIGNIFICANT CHANGE UP (ref 27–34)
MCHC RBC-ENTMCNC: 33.2 GM/DL — SIGNIFICANT CHANGE UP (ref 32–36)
MCV RBC AUTO: 85.6 FL — SIGNIFICANT CHANGE UP (ref 80–100)
METHOD TYPE: SIGNIFICANT CHANGE UP
NRBC # BLD: 0 /100 WBCS — SIGNIFICANT CHANGE UP (ref 0–0)
PHOSPHATE SERPL-MCNC: 1.7 MG/DL — LOW (ref 2.5–4.5)
PLATELET # BLD AUTO: 269 K/UL — SIGNIFICANT CHANGE UP (ref 150–400)
POTASSIUM SERPL-MCNC: 4.2 MMOL/L — SIGNIFICANT CHANGE UP (ref 3.5–5.3)
POTASSIUM SERPL-SCNC: 4.2 MMOL/L — SIGNIFICANT CHANGE UP (ref 3.5–5.3)
PROT SERPL-MCNC: 6.7 G/DL — SIGNIFICANT CHANGE UP (ref 6–8.3)
RBC # BLD: 4.72 M/UL — SIGNIFICANT CHANGE UP (ref 4.2–5.8)
RBC # FLD: 13.3 % — SIGNIFICANT CHANGE UP (ref 10.3–14.5)
SARS-COV-2 RNA SPEC QL NAA+PROBE: SIGNIFICANT CHANGE UP
SODIUM SERPL-SCNC: 133 MMOL/L — LOW (ref 135–145)
WBC # BLD: 15.43 K/UL — HIGH (ref 3.8–10.5)
WBC # FLD AUTO: 15.43 K/UL — HIGH (ref 3.8–10.5)

## 2023-03-03 PROCEDURE — 99232 SBSQ HOSP IP/OBS MODERATE 35: CPT

## 2023-03-03 RX ORDER — SODIUM,POTASSIUM PHOSPHATES 278-250MG
1 POWDER IN PACKET (EA) ORAL ONCE
Refills: 0 | Status: COMPLETED | OUTPATIENT
Start: 2023-03-03 | End: 2023-03-03

## 2023-03-03 RX ORDER — INSULIN GLARGINE 100 [IU]/ML
4 INJECTION, SOLUTION SUBCUTANEOUS AT BEDTIME
Refills: 0 | Status: DISCONTINUED | OUTPATIENT
Start: 2023-03-03 | End: 2023-03-06

## 2023-03-03 RX ORDER — POLYETHYLENE GLYCOL 3350 17 G/17G
17 POWDER, FOR SOLUTION ORAL DAILY
Refills: 0 | Status: DISCONTINUED | OUTPATIENT
Start: 2023-03-03 | End: 2023-03-06

## 2023-03-03 RX ORDER — SENNA PLUS 8.6 MG/1
1 TABLET ORAL AT BEDTIME
Refills: 0 | Status: DISCONTINUED | OUTPATIENT
Start: 2023-03-03 | End: 2023-03-06

## 2023-03-03 RX ADMIN — PIPERACILLIN AND TAZOBACTAM 25 GRAM(S): 4; .5 INJECTION, POWDER, LYOPHILIZED, FOR SOLUTION INTRAVENOUS at 15:01

## 2023-03-03 RX ADMIN — MEMANTINE HYDROCHLORIDE 5 MILLIGRAM(S): 10 TABLET ORAL at 12:18

## 2023-03-03 RX ADMIN — Medication 975 MILLIGRAM(S): at 12:18

## 2023-03-03 RX ADMIN — Medication 975 MILLIGRAM(S): at 20:42

## 2023-03-03 RX ADMIN — Medication 81 MILLIGRAM(S): at 12:19

## 2023-03-03 RX ADMIN — ENOXAPARIN SODIUM 40 MILLIGRAM(S): 100 INJECTION SUBCUTANEOUS at 05:47

## 2023-03-03 RX ADMIN — ATORVASTATIN CALCIUM 80 MILLIGRAM(S): 80 TABLET, FILM COATED ORAL at 21:27

## 2023-03-03 RX ADMIN — SENNA PLUS 1 TABLET(S): 8.6 TABLET ORAL at 21:27

## 2023-03-03 RX ADMIN — Medication 1 PACKET(S): at 20:46

## 2023-03-03 RX ADMIN — Medication 1: at 13:09

## 2023-03-03 RX ADMIN — PIPERACILLIN AND TAZOBACTAM 25 GRAM(S): 4; .5 INJECTION, POWDER, LYOPHILIZED, FOR SOLUTION INTRAVENOUS at 05:49

## 2023-03-03 RX ADMIN — Medication 2: at 22:29

## 2023-03-03 RX ADMIN — PIPERACILLIN AND TAZOBACTAM 25 GRAM(S): 4; .5 INJECTION, POWDER, LYOPHILIZED, FOR SOLUTION INTRAVENOUS at 22:30

## 2023-03-03 RX ADMIN — INSULIN GLARGINE 4 UNIT(S): 100 INJECTION, SOLUTION SUBCUTANEOUS at 22:29

## 2023-03-03 RX ADMIN — Medication 63.75 MILLIMOLE(S): at 09:21

## 2023-03-03 RX ADMIN — Medication 975 MILLIGRAM(S): at 05:51

## 2023-03-03 RX ADMIN — Medication 2: at 09:20

## 2023-03-03 RX ADMIN — Medication 5 MILLIGRAM(S): at 20:47

## 2023-03-03 RX ADMIN — Medication 5 MILLIGRAM(S): at 08:19

## 2023-03-03 NOTE — PROVIDER CONTACT NOTE (CHANGE IN STATUS NOTIFICATION) - ASSESSMENT
Pt was confused thinking he was home and wanted to go downstairs to his room. This RN reoriented patient couple of times but pt got combative trying to hit both the CNA and this RN. Called his son Kelby at [phone] and he reoriented patient. After about ten minutes, patient went back to his recliner. Pt was left in room, with bed alarm on, and call light within reach.

## 2023-03-03 NOTE — PROGRESS NOTE ADULT - SUBJECTIVE AND OBJECTIVE BOX
CC: F/U for Bacteremia    Saw/spoke to patient. No fevers, no chills. No new complaints.    Allergies  phenothiazines (Hives)  Plavix (Hives)    ANTIMICROBIALS:  piperacillin/tazobactam IVPB.. 3.375 every 8 hours    PE:    Vital Signs Last 24 Hrs  T(C): 36.6 (03 Mar 2023 09:28), Max: 36.8 (03 Mar 2023 01:08)  T(F): 97.9 (03 Mar 2023 09:28), Max: 98.2 (03 Mar 2023 01:08)  HR: 76 (03 Mar 2023 09:28) (76 - 85)  BP: 155/79 (03 Mar 2023 09:28) (100/63 - 155/79)  RR: 18 (03 Mar 2023 09:28) (18 - 18)  SpO2: 94% (03 Mar 2023 09:28) (94% - 96%)    Gen: AOx3, NAD, non-toxic  CV: Nontachycardic  Resp: Breathing comfortably, RA  Abd: Soft, nontender  IV/Skin: No thrombophlebitis    LABS:                        13.4   15.43 )-----------( 269      ( 03 Mar 2023 07:19 )             40.4     03-03    133<L>  |  97  |  22  ----------------------------<  220<H>  4.2   |  25  |  0.91    Ca    9.0      03 Mar 2023 07:19  Phos  1.7     03-03  Mg     2.0     03-03    TPro  6.7  /  Alb  2.7<L>  /  TBili  0.2  /  DBili  x   /  AST  13  /  ALT  19  /  AlkPhos  82  03-03    MICROBIOLOGY:    Bile Bile Fluid  03-01-23   Few Klebsiella pneumoniae  Few Escherichia coli  --    polymorphonuclear leukocytes seen  Gram Variable Rods seen  by cytocentrifuge    .Blood Blood-Venous  03-01-23   No growth to date.  --  --    .Blood Blood  03-01-23   No growth to date.  --  --    Clean Catch Clean Catch (Midstream)  02-28-23   <10,000 CFU/mL Normal Urogenital Bela  --  --    RADIOLOGY:    3/2 XR:    IMPRESSION:  No pneumothorax.    Unchanged right basilar atelectasis.

## 2023-03-03 NOTE — PROGRESS NOTE ADULT - SUBJECTIVE AND OBJECTIVE BOX
Red Team (General Surgery) Daily Progress Note    SUBJECTIVE:  Pt seen and examined, and is resting comfortably in bed. Pt reports moderate RUQ pain. Pt denies nausea/emesis.     OBJECTIVE:  Vital Signs Last 24 Hrs  T(C): 36.3 (03 Mar 2023 05:46), Max: 36.8 (03 Mar 2023 01:08)  T(F): 97.3 (03 Mar 2023 05:46), Max: 98.2 (03 Mar 2023 01:08)  HR: 85 (03 Mar 2023 05:46) (75 - 85)  BP: 154/79 (03 Mar 2023 05:46) (100/63 - 154/79)  BP(mean): --  RR: 18 (03 Mar 2023 05:46) (18 - 18)  SpO2: 96% (03 Mar 2023 05:46) (94% - 96%)    Parameters below as of 03 Mar 2023 05:46  Patient On (Oxygen Delivery Method): room air        I&O's Detail    02 Mar 2023 07:01  -  03 Mar 2023 07:00  --------------------------------------------------------  IN:    Oral Fluid: 480 mL  Total IN: 480 mL    OUT:    Bulb (mL): 67 mL    Voided (mL): 125 mL  Total OUT: 192 mL    Total NET: 288 mL        Exam:  General: NAD, resting comfortably in bed  Neuro: A/O x 3, no focal deficits  Pulmonary: Nonlabored breathing  Abdominal: softly distended, port dressings dry and intact with mild erythema and no drainage, RUQ TTP  Incision: C/D/I   Drains: JPx1 c dark ss output in bulb  Extremities: WWP                        13.4   15.43 )-----------( 269      ( 03 Mar 2023 07:19 )             40.4       03-02    137  |  98  |  18  ----------------------------<  200<H>  3.6   |  22  |  0.93    Ca    8.8      02 Mar 2023 07:19  Phos  3.3     03-02  Mg     2.1     03-02    TPro  6.6  /  Alb  2.8<L>  /  TBili  0.2  /  DBili  x   /  AST  57<H>  /  ALT  36  /  AlkPhos  74  03-02          ASSESSMENT:  78M w/PMH of HTN. HLD, DM2, CABG, previous umbilical hernia repair with mesh, p/w acute cholecystitis. WBC 12k on admission, most recently 16k; LFTs WNL; ultrasound demonstrating distended GB with 5mm wall, 6mm CBD. Pt is s/p laparoscopic cholecystectomy on 3/1.    PLAN:  - Diet: regular diet  - Drain Teaching  - Pain Control  - Activity: OOB/ambulation  - Incentive spirometry  - c/w Zosyn  - f/u ID re abx  - DVT prophylaxis  - Dispo planning        Red Surgery  [id number]       Red Team (General Surgery) Daily Progress Note    SUBJECTIVE:  Pt seen and examined, and is resting comfortably in bed. Pt reports moderate RUQ pain. Pt denies nausea/emesis.     OBJECTIVE:  Vital Signs Last 24 Hrs  T(C): 36.3 (03 Mar 2023 05:46), Max: 36.8 (03 Mar 2023 01:08)  T(F): 97.3 (03 Mar 2023 05:46), Max: 98.2 (03 Mar 2023 01:08)  HR: 85 (03 Mar 2023 05:46) (75 - 85)  BP: 154/79 (03 Mar 2023 05:46) (100/63 - 154/79)  BP(mean): --  RR: 18 (03 Mar 2023 05:46) (18 - 18)  SpO2: 96% (03 Mar 2023 05:46) (94% - 96%)    Parameters below as of 03 Mar 2023 05:46  Patient On (Oxygen Delivery Method): room air        I&O's Detail    02 Mar 2023 07:01  -  03 Mar 2023 07:00  --------------------------------------------------------  IN:    Oral Fluid: 480 mL  Total IN: 480 mL    OUT:    Bulb (mL): 67 mL    Voided (mL): 125 mL  Total OUT: 192 mL    Total NET: 288 mL        Exam:  General: NAD, resting comfortably in bed  Neuro: A/O x 3, no focal deficits  Pulmonary: Nonlabored breathing  Abdominal: softly distended, port dressings clean/dry/intact with mild erythema, RUQ TTP  Incision: C/D/I   Drains: JPx1 c dark ss output in bulb  Extremities: WWP                        13.4   15.43 )-----------( 269      ( 03 Mar 2023 07:19 )             40.4       03-02    137  |  98  |  18  ----------------------------<  200<H>  3.6   |  22  |  0.93    Ca    8.8      02 Mar 2023 07:19  Phos  3.3     03-02  Mg     2.1     03-02    TPro  6.6  /  Alb  2.8<L>  /  TBili  0.2  /  DBili  x   /  AST  57<H>  /  ALT  36  /  AlkPhos  74  03-02          ASSESSMENT:  78M w/PMH of HTN. HLD, DM2, CABG, previous umbilical hernia repair with mesh, p/w acute cholecystitis. WBC 12k on admission, most recently 16k; LFTs WNL; ultrasound demonstrating distended GB with 5mm wall, 6mm CBD. Pt is s/p laparoscopic cholecystectomy on 3/1.    PLAN:  - Diet: regular diet  - Drain Teaching  - Pain Control  - Activity: OOB/ambulation  - Incentive spirometry  - c/w Zosyn  - f/u ID re abx  - DVT prophylaxis  - Dispo planning        Red Surgery  [id number]

## 2023-03-03 NOTE — PROGRESS NOTE ADULT - SUBJECTIVE AND OBJECTIVE BOX
Patient is a 78y old  Male who presents with a chief complaint of acute cholecystitis (03 Mar 2023 07:54)      DATE OF SERVICE: 03-03-23 @ 13:46    SUBJECTIVE / OVERNIGHT EVENTS: overnight events noted    ROS:  Resp: No cough no sputum production  CVS: No chest pain no palpitations no orthopnea  GI: no N/V/D  : no dysuria, no hematuria  "I feel fine'       MEDICATIONS  (STANDING):  acetaminophen     Tablet .. 975 milliGRAM(s) Oral every 6 hours  aspirin enteric coated 81 milliGRAM(s) Oral daily  atorvastatin 80 milliGRAM(s) Oral at bedtime  dextrose 50% Injectable 25 Gram(s) IV Push once  dextrose 50% Injectable 12.5 Gram(s) IV Push once  dextrose 50% Injectable 25 Gram(s) IV Push once  enoxaparin Injectable 40 milliGRAM(s) SubCutaneous every 24 hours  glucagon  Injectable 1 milliGRAM(s) IntraMuscular once  insulin lispro (ADMELOG) corrective regimen sliding scale   SubCutaneous Before meals and at bedtime  memantine 5 milliGRAM(s) Oral daily  oxybutynin 5 milliGRAM(s) Oral two times a day  piperacillin/tazobactam IVPB.. 3.375 Gram(s) IV Intermittent every 8 hours  potassium phosphate / sodium phosphate Powder (PHOS-NaK) 1 Packet(s) Oral once    MEDICATIONS  (PRN):  dextrose Oral Gel 15 Gram(s) Oral once PRN Blood Glucose LESS THAN 70 milliGRAM(s)/deciliter  traMADol 25 milliGRAM(s) Oral every 6 hours PRN Moderate Pain (4 - 6)  traMADol 50 milliGRAM(s) Oral every 6 hours PRN Severe Pain (7 - 10)        CAPILLARY BLOOD GLUCOSE      POCT Blood Glucose.: 190 mg/dL (03 Mar 2023 12:47)  POCT Blood Glucose.: 202 mg/dL (03 Mar 2023 08:59)  POCT Blood Glucose.: 249 mg/dL (02 Mar 2023 21:42)  POCT Blood Glucose.: 254 mg/dL (02 Mar 2023 18:41)  POCT Blood Glucose.: 239 mg/dL (02 Mar 2023 14:02)    I&O's Summary    02 Mar 2023 07:01  -  03 Mar 2023 07:00  --------------------------------------------------------  IN: 480 mL / OUT: 192 mL / NET: 288 mL    03 Mar 2023 07:01  -  03 Mar 2023 13:46  --------------------------------------------------------  IN: 180 mL / OUT: 252 mL / NET: -72 mL        Vital Signs Last 24 Hrs  T(C): 36.6 (03 Mar 2023 09:28), Max: 36.8 (03 Mar 2023 01:08)  T(F): 97.9 (03 Mar 2023 09:28), Max: 98.2 (03 Mar 2023 01:08)  HR: 76 (03 Mar 2023 09:28) (76 - 85)  BP: 155/79 (03 Mar 2023 09:28) (100/63 - 155/79)  BP(mean): --  RR: 18 (03 Mar 2023 09:28) (18 - 18)  SpO2: 94% (03 Mar 2023 09:28) (94% - 96%)    PHYSICAL EXAM:  GENERAL: NAD  NECK: Supple  CHEST/LUNG: Clear  HEART: S1S2; no murmurs  ABDOMEN: Soft, appropriate tenderness   EXTREMITIES: no edema  PSYCH: alert at baseline   NEUROLOGY: no focal motor  SKIN: No rashes or lesions    LABS:                        13.4   15.43 )-----------( 269      ( 03 Mar 2023 07:19 )             40.4     03-03    133<L>  |  97  |  22  ----------------------------<  220<H>  4.2   |  25  |  0.91    Ca    9.0      03 Mar 2023 07:19  Phos  1.7     03-03  Mg     2.0     03-03    TPro  6.7  /  Alb  2.7<L>  /  TBili  0.2  /  DBili  x   /  AST  13  /  ALT  19  /  AlkPhos  82  03-03                All consultant(s) notes reviewed and care discussed with other providers        Contact Number, Dr Contreras [phone]

## 2023-03-03 NOTE — PROVIDER CONTACT NOTE (CHANGE IN STATUS NOTIFICATION) - BACKGROUND
Pt got off the recliner without using his call light and started walking towards the door saying he was "checking out."

## 2023-03-04 LAB
ALBUMIN SERPL ELPH-MCNC: 2.7 G/DL — LOW (ref 3.3–5)
ALP SERPL-CCNC: 92 U/L — SIGNIFICANT CHANGE UP (ref 40–120)
ALT FLD-CCNC: 15 U/L — SIGNIFICANT CHANGE UP (ref 10–45)
ANION GAP SERPL CALC-SCNC: 12 MMOL/L — SIGNIFICANT CHANGE UP (ref 5–17)
AST SERPL-CCNC: 8 U/L — LOW (ref 10–40)
BILIRUB DIRECT SERPL-MCNC: <0.1 MG/DL — SIGNIFICANT CHANGE UP (ref 0–0.3)
BILIRUB INDIRECT FLD-MCNC: >0.2 MG/DL — SIGNIFICANT CHANGE UP (ref 0.2–1)
BILIRUB SERPL-MCNC: 0.3 MG/DL — SIGNIFICANT CHANGE UP (ref 0.2–1.2)
BUN SERPL-MCNC: 22 MG/DL — SIGNIFICANT CHANGE UP (ref 7–23)
CALCIUM SERPL-MCNC: 9.4 MG/DL — SIGNIFICANT CHANGE UP (ref 8.4–10.5)
CHLORIDE SERPL-SCNC: 98 MMOL/L — SIGNIFICANT CHANGE UP (ref 96–108)
CO2 SERPL-SCNC: 24 MMOL/L — SIGNIFICANT CHANGE UP (ref 22–31)
CREAT SERPL-MCNC: 0.77 MG/DL — SIGNIFICANT CHANGE UP (ref 0.5–1.3)
EGFR: 93 ML/MIN/1.73M2 — SIGNIFICANT CHANGE UP
GLUCOSE BLDC GLUCOMTR-MCNC: 196 MG/DL — HIGH (ref 70–99)
GLUCOSE BLDC GLUCOMTR-MCNC: 212 MG/DL — HIGH (ref 70–99)
GLUCOSE BLDC GLUCOMTR-MCNC: 214 MG/DL — HIGH (ref 70–99)
GLUCOSE SERPL-MCNC: 209 MG/DL — HIGH (ref 70–99)
HCT VFR BLD CALC: 40.3 % — SIGNIFICANT CHANGE UP (ref 39–50)
HGB BLD-MCNC: 13.5 G/DL — SIGNIFICANT CHANGE UP (ref 13–17)
MAGNESIUM SERPL-MCNC: 2 MG/DL — SIGNIFICANT CHANGE UP (ref 1.6–2.6)
MCHC RBC-ENTMCNC: 28.6 PG — SIGNIFICANT CHANGE UP (ref 27–34)
MCHC RBC-ENTMCNC: 33.5 GM/DL — SIGNIFICANT CHANGE UP (ref 32–36)
MCV RBC AUTO: 85.4 FL — SIGNIFICANT CHANGE UP (ref 80–100)
NRBC # BLD: 0 /100 WBCS — SIGNIFICANT CHANGE UP (ref 0–0)
PHOSPHATE SERPL-MCNC: 2.6 MG/DL — SIGNIFICANT CHANGE UP (ref 2.5–4.5)
PLATELET # BLD AUTO: 284 K/UL — SIGNIFICANT CHANGE UP (ref 150–400)
POTASSIUM SERPL-MCNC: 4.1 MMOL/L — SIGNIFICANT CHANGE UP (ref 3.5–5.3)
POTASSIUM SERPL-SCNC: 4.1 MMOL/L — SIGNIFICANT CHANGE UP (ref 3.5–5.3)
PROT SERPL-MCNC: 7.1 G/DL — SIGNIFICANT CHANGE UP (ref 6–8.3)
RBC # BLD: 4.72 M/UL — SIGNIFICANT CHANGE UP (ref 4.2–5.8)
RBC # FLD: 13.3 % — SIGNIFICANT CHANGE UP (ref 10.3–14.5)
SODIUM SERPL-SCNC: 134 MMOL/L — LOW (ref 135–145)
WBC # BLD: 13.21 K/UL — HIGH (ref 3.8–10.5)
WBC # FLD AUTO: 13.21 K/UL — HIGH (ref 3.8–10.5)

## 2023-03-04 RX ORDER — METRONIDAZOLE 500 MG
500 TABLET ORAL EVERY 12 HOURS
Refills: 0 | Status: DISCONTINUED | OUTPATIENT
Start: 2023-03-04 | End: 2023-03-06

## 2023-03-04 RX ORDER — CEFTRIAXONE 500 MG/1
1000 INJECTION, POWDER, FOR SOLUTION INTRAMUSCULAR; INTRAVENOUS EVERY 24 HOURS
Refills: 0 | Status: DISCONTINUED | OUTPATIENT
Start: 2023-03-04 | End: 2023-03-06

## 2023-03-04 RX ADMIN — ATORVASTATIN CALCIUM 80 MILLIGRAM(S): 80 TABLET, FILM COATED ORAL at 22:26

## 2023-03-04 RX ADMIN — Medication 2: at 18:56

## 2023-03-04 RX ADMIN — Medication 81 MILLIGRAM(S): at 12:52

## 2023-03-04 RX ADMIN — SENNA PLUS 1 TABLET(S): 8.6 TABLET ORAL at 22:25

## 2023-03-04 RX ADMIN — Medication 975 MILLIGRAM(S): at 17:22

## 2023-03-04 RX ADMIN — POLYETHYLENE GLYCOL 3350 17 GRAM(S): 17 POWDER, FOR SOLUTION ORAL at 12:52

## 2023-03-04 RX ADMIN — Medication 975 MILLIGRAM(S): at 12:52

## 2023-03-04 RX ADMIN — Medication 5 MILLIGRAM(S): at 17:22

## 2023-03-04 RX ADMIN — CEFTRIAXONE 100 MILLIGRAM(S): 500 INJECTION, POWDER, FOR SOLUTION INTRAMUSCULAR; INTRAVENOUS at 15:27

## 2023-03-04 RX ADMIN — MEMANTINE HYDROCHLORIDE 5 MILLIGRAM(S): 10 TABLET ORAL at 12:59

## 2023-03-04 RX ADMIN — Medication 500 MILLIGRAM(S): at 18:41

## 2023-03-04 RX ADMIN — Medication 975 MILLIGRAM(S): at 05:42

## 2023-03-04 RX ADMIN — Medication 5 MILLIGRAM(S): at 05:41

## 2023-03-04 RX ADMIN — ENOXAPARIN SODIUM 40 MILLIGRAM(S): 100 INJECTION SUBCUTANEOUS at 05:42

## 2023-03-04 RX ADMIN — Medication 85 MILLIMOLE(S): at 12:59

## 2023-03-04 RX ADMIN — INSULIN GLARGINE 4 UNIT(S): 100 INJECTION, SOLUTION SUBCUTANEOUS at 22:23

## 2023-03-04 RX ADMIN — PIPERACILLIN AND TAZOBACTAM 25 GRAM(S): 4; .5 INJECTION, POWDER, LYOPHILIZED, FOR SOLUTION INTRAVENOUS at 05:42

## 2023-03-04 RX ADMIN — Medication 2: at 22:24

## 2023-03-04 RX ADMIN — Medication 1: at 12:58

## 2023-03-04 NOTE — PROGRESS NOTE ADULT - SUBJECTIVE AND OBJECTIVE BOX
Red Team (General Surgery) Daily Progress Note    SUBJECTIVE:  Pt seen and examined, and is resting comfortably in bed. Pt reports moderate RUQ pain, improved from previous. Pt denies nausea/emesis.     OBJECTIVE:  Vital Signs Last 24 Hrs  T(C): 36.5 (04 Mar 2023 05:22), Max: 36.8 (03 Mar 2023 21:58)  T(F): 97.7 (04 Mar 2023 05:22), Max: 98.2 (03 Mar 2023 21:58)  HR: 83 (04 Mar 2023 05:22) (74 - 83)  BP: 145/78 (04 Mar 2023 05:22) (124/75 - 148/70)  RR: 18 (04 Mar 2023 05:22) (18 - 18)  SpO2: 95% (04 Mar 2023 05:22) (92% - 95%)  Parameters below as of 04 Mar 2023 05:22  Patient On (Oxygen Delivery Method): room air    I&O's Summary  03 Mar 2023 07:01  -  04 Mar 2023 07:00  --------------------------------------------------------  IN: 780 mL / OUT: 557 mL / NET: 223 mL    Exam:  General: NAD, resting comfortably in bed  Neuro: A/O x 3, no focal deficits  Pulmonary: Nonlabored breathing  Abdominal: softly distended, port dressings clean/dry/intact with mild erythema, RUQ TTP  Incision: C/D/I   Drains: JPx1 c dark ss output in bulb  Extremities: WWP    LABS:             13.5   13.21 )-----------( 284      ( 04 Mar 2023 06:43 )             40.3     03-04    134<L>  |  98  |  22  ----------------------------<  209<H>  4.1   |  24  |  0.77    Ca    9.4      04 Mar 2023 06:43  Phos  2.6     03-04  Mg     2.0     03-04    TPro  7.1  /  Alb  2.7<L>  /  TBili  0.3  /  DBili  <0.1  /  AST  8<L>  /  ALT  15  /  AlkPhos  92  03-04

## 2023-03-04 NOTE — PROGRESS NOTE ADULT - SUBJECTIVE AND OBJECTIVE BOX
Patient is a 78y old  Male who presents with a chief complaint of acute cholecystitis (04 Mar 2023 10:29)      DATE OF SERVICE: 03-04-23 @ 12:13    SUBJECTIVE / OVERNIGHT EVENTS: overnight events noted    ROS:  Resp: No cough no sputum production  CVS: No chest pain no palpitations no orthopnea  GI: no N/V/D  : no dysuria, no hematuria        MEDICATIONS  (STANDING):  acetaminophen     Tablet .. 975 milliGRAM(s) Oral every 6 hours  aspirin enteric coated 81 milliGRAM(s) Oral daily  atorvastatin 80 milliGRAM(s) Oral at bedtime  dextrose 50% Injectable 25 Gram(s) IV Push once  dextrose 50% Injectable 12.5 Gram(s) IV Push once  dextrose 50% Injectable 25 Gram(s) IV Push once  enoxaparin Injectable 40 milliGRAM(s) SubCutaneous every 24 hours  glucagon  Injectable 1 milliGRAM(s) IntraMuscular once  insulin glargine Injectable (LANTUS) 4 Unit(s) SubCutaneous at bedtime  insulin lispro (ADMELOG) corrective regimen sliding scale   SubCutaneous Before meals and at bedtime  memantine 5 milliGRAM(s) Oral daily  oxybutynin 5 milliGRAM(s) Oral two times a day  piperacillin/tazobactam IVPB.. 3.375 Gram(s) IV Intermittent every 8 hours  polyethylene glycol 3350 17 Gram(s) Oral daily  senna 1 Tablet(s) Oral at bedtime  sodium phosphate 30 milliMole(s)/500 mL IVPB 30 milliMole(s) IV Intermittent once    MEDICATIONS  (PRN):  dextrose Oral Gel 15 Gram(s) Oral once PRN Blood Glucose LESS THAN 70 milliGRAM(s)/deciliter  traMADol 25 milliGRAM(s) Oral every 6 hours PRN Moderate Pain (4 - 6)  traMADol 50 milliGRAM(s) Oral every 6 hours PRN Severe Pain (7 - 10)        CAPILLARY BLOOD GLUCOSE      POCT Blood Glucose.: 213 mg/dL (03 Mar 2023 21:43)  POCT Blood Glucose.: 159 mg/dL (03 Mar 2023 18:58)  POCT Blood Glucose.: 190 mg/dL (03 Mar 2023 12:47)    I&O's Summary    03 Mar 2023 07:01  -  04 Mar 2023 07:00  --------------------------------------------------------  IN: 780 mL / OUT: 557 mL / NET: 223 mL    04 Mar 2023 07:01  -  04 Mar 2023 12:13  --------------------------------------------------------  IN: 0 mL / OUT: 900 mL / NET: -900 mL        Vital Signs Last 24 Hrs  T(C): 36.5 (04 Mar 2023 10:36), Max: 36.8 (03 Mar 2023 21:58)  T(F): 97.7 (04 Mar 2023 10:36), Max: 98.2 (03 Mar 2023 21:58)  HR: 85 (04 Mar 2023 10:36) (74 - 85)  BP: 144/83 (04 Mar 2023 10:36) (124/75 - 148/70)  BP(mean): --  RR: 18 (04 Mar 2023 10:36) (18 - 18)  SpO2: 95% (04 Mar 2023 10:36) (92% - 95%)    PHYSICAL EXAM:  GENERAL: NAD  NECK: Supple  CHEST/LUNG: Clear  HEART: S1S2; no murmurs  ABDOMEN: Soft, minimal tenderness   EXTREMITIES: no edema  PSYCH: alert at baseline   NEUROLOGY: no focal motor  SKIN: No rashes or lesions    LABS:                        13.5   13.21 )-----------( 284      ( 04 Mar 2023 06:43 )             40.3     03-04    134<L>  |  98  |  22  ----------------------------<  209<H>  4.1   |  24  |  0.77    Ca    9.4      04 Mar 2023 06:43  Phos  2.6     03-04  Mg     2.0     03-04    TPro  7.1  /  Alb  2.7<L>  /  TBili  0.3  /  DBili  <0.1  /  AST  8<L>  /  ALT  15  /  AlkPhos  92  03-04                All consultant(s) notes reviewed and care discussed with other providers        Contact Number, Dr Contreras [phone]

## 2023-03-04 NOTE — CHART NOTE - NSCHARTNOTEFT_GEN_A_CORE
Reviewed culture. DC Zosyn. Started Ceftriaxone/Flagyl.    Ashkan Cleveland MD  Contact on TEAMS messaging from 9am - 5pm (not available in hospital this weekend)  From 5pm-9am, on weekends, or if no response call [phone]

## 2023-03-05 LAB
ALBUMIN SERPL ELPH-MCNC: 2.6 G/DL — LOW (ref 3.3–5)
ALP SERPL-CCNC: 93 U/L — SIGNIFICANT CHANGE UP (ref 40–120)
ALT FLD-CCNC: 10 U/L — SIGNIFICANT CHANGE UP (ref 10–45)
ANION GAP SERPL CALC-SCNC: 12 MMOL/L — SIGNIFICANT CHANGE UP (ref 5–17)
AST SERPL-CCNC: 10 U/L — SIGNIFICANT CHANGE UP (ref 10–40)
BILIRUB SERPL-MCNC: 0.3 MG/DL — SIGNIFICANT CHANGE UP (ref 0.2–1.2)
BUN SERPL-MCNC: 19 MG/DL — SIGNIFICANT CHANGE UP (ref 7–23)
CALCIUM SERPL-MCNC: 9.3 MG/DL — SIGNIFICANT CHANGE UP (ref 8.4–10.5)
CHLORIDE SERPL-SCNC: 98 MMOL/L — SIGNIFICANT CHANGE UP (ref 96–108)
CO2 SERPL-SCNC: 25 MMOL/L — SIGNIFICANT CHANGE UP (ref 22–31)
CREAT SERPL-MCNC: 0.64 MG/DL — SIGNIFICANT CHANGE UP (ref 0.5–1.3)
EGFR: 98 ML/MIN/1.73M2 — SIGNIFICANT CHANGE UP
GLUCOSE BLDC GLUCOMTR-MCNC: 157 MG/DL — HIGH (ref 70–99)
GLUCOSE BLDC GLUCOMTR-MCNC: 162 MG/DL — HIGH (ref 70–99)
GLUCOSE BLDC GLUCOMTR-MCNC: 180 MG/DL — HIGH (ref 70–99)
GLUCOSE BLDC GLUCOMTR-MCNC: 235 MG/DL — HIGH (ref 70–99)
GLUCOSE BLDC GLUCOMTR-MCNC: 246 MG/DL — HIGH (ref 70–99)
GLUCOSE SERPL-MCNC: 185 MG/DL — HIGH (ref 70–99)
HCT VFR BLD CALC: 38.1 % — LOW (ref 39–50)
HGB BLD-MCNC: 12.8 G/DL — LOW (ref 13–17)
MAGNESIUM SERPL-MCNC: 1.8 MG/DL — SIGNIFICANT CHANGE UP (ref 1.6–2.6)
MCHC RBC-ENTMCNC: 28.9 PG — SIGNIFICANT CHANGE UP (ref 27–34)
MCHC RBC-ENTMCNC: 33.6 GM/DL — SIGNIFICANT CHANGE UP (ref 32–36)
MCV RBC AUTO: 86 FL — SIGNIFICANT CHANGE UP (ref 80–100)
NRBC # BLD: 0 /100 WBCS — SIGNIFICANT CHANGE UP (ref 0–0)
PHOSPHATE SERPL-MCNC: 2.4 MG/DL — LOW (ref 2.5–4.5)
PLATELET # BLD AUTO: 292 K/UL — SIGNIFICANT CHANGE UP (ref 150–400)
POTASSIUM SERPL-MCNC: 3.9 MMOL/L — SIGNIFICANT CHANGE UP (ref 3.5–5.3)
POTASSIUM SERPL-SCNC: 3.9 MMOL/L — SIGNIFICANT CHANGE UP (ref 3.5–5.3)
PROT SERPL-MCNC: 6.8 G/DL — SIGNIFICANT CHANGE UP (ref 6–8.3)
RAPID RVP RESULT: SIGNIFICANT CHANGE UP
RBC # BLD: 4.43 M/UL — SIGNIFICANT CHANGE UP (ref 4.2–5.8)
RBC # FLD: 13.4 % — SIGNIFICANT CHANGE UP (ref 10.3–14.5)
SARS-COV-2 RNA SPEC QL NAA+PROBE: SIGNIFICANT CHANGE UP
SODIUM SERPL-SCNC: 135 MMOL/L — SIGNIFICANT CHANGE UP (ref 135–145)
WBC # BLD: 9.15 K/UL — SIGNIFICANT CHANGE UP (ref 3.8–10.5)
WBC # FLD AUTO: 9.15 K/UL — SIGNIFICANT CHANGE UP (ref 3.8–10.5)

## 2023-03-05 RX ORDER — MAGNESIUM SULFATE 500 MG/ML
2 VIAL (ML) INJECTION ONCE
Refills: 0 | Status: COMPLETED | OUTPATIENT
Start: 2023-03-05 | End: 2023-03-05

## 2023-03-05 RX ADMIN — Medication 2: at 22:05

## 2023-03-05 RX ADMIN — Medication 5 MILLIGRAM(S): at 06:49

## 2023-03-05 RX ADMIN — Medication 2: at 18:15

## 2023-03-05 RX ADMIN — Medication 85 MILLIMOLE(S): at 14:20

## 2023-03-05 RX ADMIN — ATORVASTATIN CALCIUM 80 MILLIGRAM(S): 80 TABLET, FILM COATED ORAL at 22:04

## 2023-03-05 RX ADMIN — ENOXAPARIN SODIUM 40 MILLIGRAM(S): 100 INJECTION SUBCUTANEOUS at 06:48

## 2023-03-05 RX ADMIN — SENNA PLUS 1 TABLET(S): 8.6 TABLET ORAL at 22:08

## 2023-03-05 RX ADMIN — Medication 5 MILLIGRAM(S): at 18:16

## 2023-03-05 RX ADMIN — Medication 975 MILLIGRAM(S): at 07:50

## 2023-03-05 RX ADMIN — CEFTRIAXONE 100 MILLIGRAM(S): 500 INJECTION, POWDER, FOR SOLUTION INTRAMUSCULAR; INTRAVENOUS at 13:09

## 2023-03-05 RX ADMIN — Medication 1: at 10:21

## 2023-03-05 RX ADMIN — Medication 1: at 13:09

## 2023-03-05 RX ADMIN — Medication 975 MILLIGRAM(S): at 06:50

## 2023-03-05 RX ADMIN — Medication 975 MILLIGRAM(S): at 18:16

## 2023-03-05 RX ADMIN — Medication 25 GRAM(S): at 10:21

## 2023-03-05 RX ADMIN — Medication 81 MILLIGRAM(S): at 13:10

## 2023-03-05 RX ADMIN — POLYETHYLENE GLYCOL 3350 17 GRAM(S): 17 POWDER, FOR SOLUTION ORAL at 13:09

## 2023-03-05 RX ADMIN — INSULIN GLARGINE 4 UNIT(S): 100 INJECTION, SOLUTION SUBCUTANEOUS at 22:05

## 2023-03-05 RX ADMIN — Medication 975 MILLIGRAM(S): at 13:09

## 2023-03-05 RX ADMIN — Medication 500 MILLIGRAM(S): at 18:16

## 2023-03-05 RX ADMIN — MEMANTINE HYDROCHLORIDE 5 MILLIGRAM(S): 10 TABLET ORAL at 18:17

## 2023-03-05 RX ADMIN — Medication 500 MILLIGRAM(S): at 06:49

## 2023-03-05 NOTE — PROGRESS NOTE ADULT - SUBJECTIVE AND OBJECTIVE BOX
Patient is a 78y old  Male who presents with a chief complaint of acute cholecystitis (05 Mar 2023 08:53)      DATE OF SERVICE: 03-05-23 @ 11:29    SUBJECTIVE / OVERNIGHT EVENTS: overnight events noted    ROS:  Resp: No cough no sputum production  CVS: No chest pain no palpitations no orthopnea  GI: no N/V/D  : no dysuria, no hematuria          MEDICATIONS  (STANDING):  acetaminophen     Tablet .. 975 milliGRAM(s) Oral every 6 hours  aspirin enteric coated 81 milliGRAM(s) Oral daily  atorvastatin 80 milliGRAM(s) Oral at bedtime  cefTRIAXone   IVPB 1000 milliGRAM(s) IV Intermittent every 24 hours  dextrose 50% Injectable 25 Gram(s) IV Push once  dextrose 50% Injectable 12.5 Gram(s) IV Push once  dextrose 50% Injectable 25 Gram(s) IV Push once  enoxaparin Injectable 40 milliGRAM(s) SubCutaneous every 24 hours  glucagon  Injectable 1 milliGRAM(s) IntraMuscular once  insulin glargine Injectable (LANTUS) 4 Unit(s) SubCutaneous at bedtime  insulin lispro (ADMELOG) corrective regimen sliding scale   SubCutaneous Before meals and at bedtime  memantine 5 milliGRAM(s) Oral daily  metroNIDAZOLE    Tablet 500 milliGRAM(s) Oral every 12 hours  oxybutynin 5 milliGRAM(s) Oral two times a day  polyethylene glycol 3350 17 Gram(s) Oral daily  senna 1 Tablet(s) Oral at bedtime  sodium phosphate 30 milliMole(s)/500 mL IVPB 30 milliMole(s) IV Intermittent once    MEDICATIONS  (PRN):  dextrose Oral Gel 15 Gram(s) Oral once PRN Blood Glucose LESS THAN 70 milliGRAM(s)/deciliter  traMADol 25 milliGRAM(s) Oral every 6 hours PRN Moderate Pain (4 - 6)  traMADol 50 milliGRAM(s) Oral every 6 hours PRN Severe Pain (7 - 10)        CAPILLARY BLOOD GLUCOSE      POCT Blood Glucose.: 180 mg/dL (05 Mar 2023 10:01)  POCT Blood Glucose.: 162 mg/dL (05 Mar 2023 08:46)  POCT Blood Glucose.: 214 mg/dL (04 Mar 2023 21:26)  POCT Blood Glucose.: 212 mg/dL (04 Mar 2023 18:36)  POCT Blood Glucose.: 196 mg/dL (04 Mar 2023 12:42)    I&O's Summary    04 Mar 2023 07:01  -  05 Mar 2023 07:00  --------------------------------------------------------  IN: 960 mL / OUT: 2130 mL / NET: -1170 mL    05 Mar 2023 07:01  -  05 Mar 2023 11:29  --------------------------------------------------------  IN: 240 mL / OUT: 200 mL / NET: 40 mL        Vital Signs Last 24 Hrs  T(C): 36.3 (05 Mar 2023 10:03), Max: 37.1 (04 Mar 2023 21:45)  T(F): 97.4 (05 Mar 2023 10:03), Max: 98.7 (04 Mar 2023 21:45)  HR: 78 (05 Mar 2023 10:03) (66 - 78)  BP: 125/73 (05 Mar 2023 10:03) (125/73 - 169/76)  BP(mean): --  RR: 18 (05 Mar 2023 10:03) (18 - 18)  SpO2: 95% (05 Mar 2023 10:03) (93% - 95%)    PHYSICAL EXAM:  GENERAL: NAD  NECK: Supple  CHEST/LUNG: Clear  HEART: S1S2; no murmurs  ABDOMEN: Soft, minimal tenderness   EXTREMITIES: no edema  PSYCH: alert at baseline   NEUROLOGY: no focal motor      LABS:                        12.8   9.15  )-----------( 292      ( 05 Mar 2023 07:27 )             38.1     03-05    135  |  98  |  19  ----------------------------<  185<H>  3.9   |  25  |  0.64    Ca    9.3      05 Mar 2023 07:27  Phos  2.4     03-05  Mg     1.8     03-05    TPro  6.8  /  Alb  2.6<L>  /  TBili  0.3  /  DBili  x   /  AST  10  /  ALT  10  /  AlkPhos  93  03-05                All consultant(s) notes reviewed and care discussed with other providers        Contact Number, Dr Contreras [phone]

## 2023-03-05 NOTE — PROGRESS NOTE ADULT - SUBJECTIVE AND OBJECTIVE BOX
Red Team (General Surgery) Daily Progress Note    SUBJECTIVE:  Pt seen and examined, and is resting comfortably in bed. Pt reports moderate abdominal pain. VSS. No acute events overnight.     OBJECTIVE:  Vital Signs Last 24 Hrs  T(C): 36.7 (05 Mar 2023 06:25), Max: 37.1 (04 Mar 2023 21:45)  T(F): 98.1 (05 Mar 2023 06:25), Max: 98.7 (04 Mar 2023 21:45)  HR: 69 (05 Mar 2023 06:25) (66 - 85)  BP: 169/76 (05 Mar 2023 06:25) (144/83 - 169/76)  BP(mean): --  RR: 18 (05 Mar 2023 06:25) (18 - 18)  SpO2: 95% (05 Mar 2023 06:25) (93% - 95%)    Parameters below as of 05 Mar 2023 06:25  Patient On (Oxygen Delivery Method): room air        I&O's Detail    04 Mar 2023 07:01  -  05 Mar 2023 07:00  --------------------------------------------------------  IN:    Oral Fluid: 960 mL  Total IN: 960 mL    OUT:    Bulb (mL): 980 mL    Voided (mL): 1150 mL  Total OUT: 2130 mL    Total NET: -1170 mL        Exam:  General: NAD, resting comfortably in bed  Neuro: A/O x 3, no focal deficits  Pulmonary: Nonlabored breathing  Abdominal: softly distended, port dressings clean/dry/intact with mild erythema, RUQ TTP  Incision: C/D/I   Drains: JPx1 c dark ss output in bulb  Extremities: WWP                        13.5   13.21 )-----------( 284      ( 04 Mar 2023 06:43 )             40.3       03-05    135  |  98  |  19  ----------------------------<  185<H>  3.9   |  25  |  0.64    Ca    9.3      05 Mar 2023 07:27  Phos  2.4     03-05  Mg     1.8     03-05    TPro  6.8  /  Alb  2.6<L>  /  TBili  0.3  /  DBili  x   /  AST  10  /  ALT  10  /  AlkPhos  93  03-05          ASSESSMENT:  78M w/PMH of HTN. HLD, DM2, CABG, previous umbilical hernia repair with mesh, p/w acute cholecystitis. Ultrasound demonstrating distended GB with 5mm wall, 6mm CBD. Now, s/p laparoscopic cholecystectomy on 3/1. Awaiting completion of IV abx course vs. PO abx on discharge, and BRISEIDA placement.    PLAN:  - Pain Control PRN  - monitor PHILLIP output  - Incentive spirometry  - c/w Ceftriaxone/Flagyl  - f/u ID regarding PO abx at discharge  - Diet: Regular  - DVT prophylaxis: lovenox 40mg QD  - Activity: OOB/ambulation  - Dispo: [facility] tomorrow      Red Surgery  [id number]

## 2023-03-06 VITALS
RESPIRATION RATE: 18 BRPM | TEMPERATURE: 98 F | HEART RATE: 82 BPM | OXYGEN SATURATION: 93 % | DIASTOLIC BLOOD PRESSURE: 75 MMHG | SYSTOLIC BLOOD PRESSURE: 155 MMHG

## 2023-03-06 LAB
ALBUMIN SERPL ELPH-MCNC: 2.6 G/DL — LOW (ref 3.3–5)
ALP SERPL-CCNC: 96 U/L — SIGNIFICANT CHANGE UP (ref 40–120)
ALT FLD-CCNC: 10 U/L — SIGNIFICANT CHANGE UP (ref 10–45)
ANION GAP SERPL CALC-SCNC: 12 MMOL/L — SIGNIFICANT CHANGE UP (ref 5–17)
AST SERPL-CCNC: 10 U/L — SIGNIFICANT CHANGE UP (ref 10–40)
BILIRUB DIRECT SERPL-MCNC: <0.1 MG/DL — SIGNIFICANT CHANGE UP (ref 0–0.3)
BILIRUB INDIRECT FLD-MCNC: >0.2 MG/DL — SIGNIFICANT CHANGE UP (ref 0.2–1)
BILIRUB SERPL-MCNC: 0.3 MG/DL — SIGNIFICANT CHANGE UP (ref 0.2–1.2)
BUN SERPL-MCNC: 16 MG/DL — SIGNIFICANT CHANGE UP (ref 7–23)
CALCIUM SERPL-MCNC: 9.3 MG/DL — SIGNIFICANT CHANGE UP (ref 8.4–10.5)
CHLORIDE SERPL-SCNC: 99 MMOL/L — SIGNIFICANT CHANGE UP (ref 96–108)
CO2 SERPL-SCNC: 24 MMOL/L — SIGNIFICANT CHANGE UP (ref 22–31)
CREAT SERPL-MCNC: 0.56 MG/DL — SIGNIFICANT CHANGE UP (ref 0.5–1.3)
CULTURE RESULTS: SIGNIFICANT CHANGE UP
EGFR: 102 ML/MIN/1.73M2 — SIGNIFICANT CHANGE UP
GLUCOSE BLDC GLUCOMTR-MCNC: 189 MG/DL — HIGH (ref 70–99)
GLUCOSE BLDC GLUCOMTR-MCNC: 225 MG/DL — HIGH (ref 70–99)
GLUCOSE SERPL-MCNC: 193 MG/DL — HIGH (ref 70–99)
HCT VFR BLD CALC: 38.1 % — LOW (ref 39–50)
HGB BLD-MCNC: 12.6 G/DL — LOW (ref 13–17)
MAGNESIUM SERPL-MCNC: 1.8 MG/DL — SIGNIFICANT CHANGE UP (ref 1.6–2.6)
MCHC RBC-ENTMCNC: 28.4 PG — SIGNIFICANT CHANGE UP (ref 27–34)
MCHC RBC-ENTMCNC: 33.1 GM/DL — SIGNIFICANT CHANGE UP (ref 32–36)
MCV RBC AUTO: 86 FL — SIGNIFICANT CHANGE UP (ref 80–100)
NRBC # BLD: 0 /100 WBCS — SIGNIFICANT CHANGE UP (ref 0–0)
ORGANISM # SPEC MICROSCOPIC CNT: SIGNIFICANT CHANGE UP
PHOSPHATE SERPL-MCNC: 2.6 MG/DL — SIGNIFICANT CHANGE UP (ref 2.5–4.5)
PLATELET # BLD AUTO: 276 K/UL — SIGNIFICANT CHANGE UP (ref 150–400)
POTASSIUM SERPL-MCNC: 3.7 MMOL/L — SIGNIFICANT CHANGE UP (ref 3.5–5.3)
POTASSIUM SERPL-SCNC: 3.7 MMOL/L — SIGNIFICANT CHANGE UP (ref 3.5–5.3)
PROT SERPL-MCNC: 6.4 G/DL — SIGNIFICANT CHANGE UP (ref 6–8.3)
RBC # BLD: 4.43 M/UL — SIGNIFICANT CHANGE UP (ref 4.2–5.8)
RBC # FLD: 13.3 % — SIGNIFICANT CHANGE UP (ref 10.3–14.5)
SODIUM SERPL-SCNC: 135 MMOL/L — SIGNIFICANT CHANGE UP (ref 135–145)
SPECIMEN SOURCE: SIGNIFICANT CHANGE UP
WBC # BLD: 6.42 K/UL — SIGNIFICANT CHANGE UP (ref 3.8–10.5)
WBC # FLD AUTO: 6.42 K/UL — SIGNIFICANT CHANGE UP (ref 3.8–10.5)

## 2023-03-06 PROCEDURE — C9399: CPT

## 2023-03-06 PROCEDURE — 87070 CULTURE OTHR SPECIMN AEROBIC: CPT

## 2023-03-06 PROCEDURE — C1889: CPT

## 2023-03-06 PROCEDURE — 93306 TTE W/DOPPLER COMPLETE: CPT

## 2023-03-06 PROCEDURE — 81001 URINALYSIS AUTO W/SCOPE: CPT

## 2023-03-06 PROCEDURE — 76775 US EXAM ABDO BACK WALL LIM: CPT

## 2023-03-06 PROCEDURE — 87075 CULTR BACTERIA EXCEPT BLOOD: CPT

## 2023-03-06 PROCEDURE — 87637 SARSCOV2&INF A&B&RSV AMP PRB: CPT

## 2023-03-06 PROCEDURE — 87086 URINE CULTURE/COLONY COUNT: CPT

## 2023-03-06 PROCEDURE — 87077 CULTURE AEROBIC IDENTIFY: CPT

## 2023-03-06 PROCEDURE — 83605 ASSAY OF LACTIC ACID: CPT

## 2023-03-06 PROCEDURE — 83735 ASSAY OF MAGNESIUM: CPT

## 2023-03-06 PROCEDURE — 82962 GLUCOSE BLOOD TEST: CPT

## 2023-03-06 PROCEDURE — 99285 EMERGENCY DEPT VISIT HI MDM: CPT | Mod: 25

## 2023-03-06 PROCEDURE — 36415 COLL VENOUS BLD VENIPUNCTURE: CPT

## 2023-03-06 PROCEDURE — 86901 BLOOD TYPING SEROLOGIC RH(D): CPT

## 2023-03-06 PROCEDURE — 76705 ECHO EXAM OF ABDOMEN: CPT

## 2023-03-06 PROCEDURE — 85025 COMPLETE CBC W/AUTO DIFF WBC: CPT

## 2023-03-06 PROCEDURE — 80076 HEPATIC FUNCTION PANEL: CPT

## 2023-03-06 PROCEDURE — 84100 ASSAY OF PHOSPHORUS: CPT

## 2023-03-06 PROCEDURE — 71045 X-RAY EXAM CHEST 1 VIEW: CPT

## 2023-03-06 PROCEDURE — 99232 SBSQ HOSP IP/OBS MODERATE 35: CPT

## 2023-03-06 PROCEDURE — 0225U NFCT DS DNA&RNA 21 SARSCOV2: CPT

## 2023-03-06 PROCEDURE — 85027 COMPLETE CBC AUTOMATED: CPT

## 2023-03-06 PROCEDURE — 93005 ELECTROCARDIOGRAM TRACING: CPT

## 2023-03-06 PROCEDURE — 87040 BLOOD CULTURE FOR BACTERIA: CPT

## 2023-03-06 PROCEDURE — 87635 SARS-COV-2 COVID-19 AMP PRB: CPT

## 2023-03-06 PROCEDURE — 85610 PROTHROMBIN TIME: CPT

## 2023-03-06 PROCEDURE — 83036 HEMOGLOBIN GLYCOSYLATED A1C: CPT

## 2023-03-06 PROCEDURE — 96374 THER/PROPH/DIAG INJ IV PUSH: CPT

## 2023-03-06 PROCEDURE — 87186 SC STD MICRODIL/AGAR DIL: CPT

## 2023-03-06 PROCEDURE — 86850 RBC ANTIBODY SCREEN: CPT

## 2023-03-06 PROCEDURE — 74177 CT ABD & PELVIS W/CONTRAST: CPT | Mod: MA

## 2023-03-06 PROCEDURE — 83690 ASSAY OF LIPASE: CPT

## 2023-03-06 PROCEDURE — 97161 PT EVAL LOW COMPLEX 20 MIN: CPT

## 2023-03-06 PROCEDURE — 97164 PT RE-EVAL EST PLAN CARE: CPT

## 2023-03-06 PROCEDURE — 85730 THROMBOPLASTIN TIME PARTIAL: CPT

## 2023-03-06 PROCEDURE — 86803 HEPATITIS C AB TEST: CPT

## 2023-03-06 PROCEDURE — 88304 TISSUE EXAM BY PATHOLOGIST: CPT

## 2023-03-06 PROCEDURE — 87205 SMEAR GRAM STAIN: CPT

## 2023-03-06 PROCEDURE — 80053 COMPREHEN METABOLIC PANEL: CPT

## 2023-03-06 PROCEDURE — 80048 BASIC METABOLIC PNL TOTAL CA: CPT

## 2023-03-06 PROCEDURE — 86900 BLOOD TYPING SEROLOGIC ABO: CPT

## 2023-03-06 RX ORDER — CEFUROXIME AXETIL 250 MG
1 TABLET ORAL
Qty: 0 | Refills: 0 | DISCHARGE
End: 2023-03-11

## 2023-03-06 RX ORDER — MAGNESIUM SULFATE 500 MG/ML
2 VIAL (ML) INJECTION ONCE
Refills: 0 | Status: COMPLETED | OUTPATIENT
Start: 2023-03-06 | End: 2023-03-06

## 2023-03-06 RX ORDER — LISINOPRIL 2.5 MG/1
10 TABLET ORAL DAILY
Refills: 0 | Status: DISCONTINUED | OUTPATIENT
Start: 2023-03-06 | End: 2023-03-06

## 2023-03-06 RX ORDER — METRONIDAZOLE 500 MG
1 TABLET ORAL
Qty: 0 | Refills: 0 | DISCHARGE
End: 2023-03-11

## 2023-03-06 RX ADMIN — Medication 5 MILLIGRAM(S): at 05:14

## 2023-03-06 RX ADMIN — Medication 2: at 12:44

## 2023-03-06 RX ADMIN — Medication 81 MILLIGRAM(S): at 11:37

## 2023-03-06 RX ADMIN — MEMANTINE HYDROCHLORIDE 5 MILLIGRAM(S): 10 TABLET ORAL at 11:37

## 2023-03-06 RX ADMIN — LISINOPRIL 10 MILLIGRAM(S): 2.5 TABLET ORAL at 05:33

## 2023-03-06 RX ADMIN — Medication 25 GRAM(S): at 09:55

## 2023-03-06 RX ADMIN — Medication 975 MILLIGRAM(S): at 11:37

## 2023-03-06 RX ADMIN — Medication 10 MILLIGRAM(S): at 14:11

## 2023-03-06 RX ADMIN — Medication 500 MILLIGRAM(S): at 05:14

## 2023-03-06 RX ADMIN — Medication 975 MILLIGRAM(S): at 12:30

## 2023-03-06 RX ADMIN — Medication 975 MILLIGRAM(S): at 00:26

## 2023-03-06 RX ADMIN — Medication 1: at 09:25

## 2023-03-06 RX ADMIN — CEFTRIAXONE 100 MILLIGRAM(S): 500 INJECTION, POWDER, FOR SOLUTION INTRAMUSCULAR; INTRAVENOUS at 13:54

## 2023-03-06 RX ADMIN — ENOXAPARIN SODIUM 40 MILLIGRAM(S): 100 INJECTION SUBCUTANEOUS at 05:17

## 2023-03-06 RX ADMIN — Medication 85 MILLIMOLE(S): at 11:57

## 2023-03-06 RX ADMIN — POLYETHYLENE GLYCOL 3350 17 GRAM(S): 17 POWDER, FOR SOLUTION ORAL at 11:36

## 2023-03-06 RX ADMIN — Medication 975 MILLIGRAM(S): at 05:15

## 2023-03-06 NOTE — PROGRESS NOTE ADULT - SUBJECTIVE AND OBJECTIVE BOX
Red Team (General Surgery) Daily Progress Note    SUBJECTIVE:  Pt seen and examined, and is resting comfortably in bed. LILLY o/n. AVSS. Pt reports abdominal pain, improved from previous examination.    OBJECTIVE:  Vital Signs Last 24 Hrs  T(C): 36.8 (06 Mar 2023 05:00), Max: 37 (05 Mar 2023 22:02)  T(F): 98.3 (06 Mar 2023 05:00), Max: 98.6 (05 Mar 2023 22:02)  HR: 62 (06 Mar 2023 05:00) (62 - 78)  BP: 162/7 (06 Mar 2023 05:00) (125/73 - 168/80)  RR: 18 (06 Mar 2023 05:00) (18 - 18)  SpO2: 96% (06 Mar 2023 05:00) (94% - 96%)  Parameters below as of 06 Mar 2023 05:00  Patient On (Oxygen Delivery Method): room air    I&O's Summary  04 Mar 2023 07:01  -  05 Mar 2023 07:00  --------------------------------------------------------  IN: 960 mL / OUT: 1320 mL / NET: -360 mL    05 Mar 2023 07:01  -  06 Mar 2023 06:52  --------------------------------------------------------  IN: 660 mL / OUT: 470 mL / NET: 190 mL    Exam:  General: NAD, resting comfortably in bed  Neuro: A/O x 3, no focal deficits  Pulmonary: Nonlabored breathing  Abdominal: softly distended, port dressings clean/dry/intact with mild erythema, RUQ TTP  Incision: C/D/I   Drains: JPx1 c dark ss output in bulb  Extremities: WWP             LABS:                     12.8   9.15  )-----------( 292      ( 05 Mar 2023 07:27 )             38.1     03-05    135  |  98  |  19  ----------------------------<  185<H>  3.9   |  25  |  0.64    Ca    9.3      05 Mar 2023 07:27  Phos  2.4     03-05  Mg     1.8     03-05    TPro  6.8  /  Alb  2.6<L>  /  TBili  0.3  /  DBili  x   /  AST  10  /  ALT  10  /  AlkPhos  93  03-05

## 2023-03-06 NOTE — PROGRESS NOTE ADULT - PROBLEM SELECTOR PLAN 1
asthma exacerbation
s/p lap adams  bile has 2 species Klebsiella one sensitive and one resistant to piperacillin/tazobactam  E coli sensitive to piperacillin/tazobactam  defer to ID  clinically improving though with decreased leukocytosis
s/p surgery   doing well  WBC improving  ID consultation appreciated   continue piperacillin/tazobactam
patient remains optimized for surgery  euvolemic no hemodynamic instability  WBC rising not entirely unexpected as patient needs lap adams to remove source of infection  ID consultation requested  discussed with surgery
s/p lap adams  doing well  WBC improving slowly]  awaiting sensitivity of E Coli and Klebsiella in the bile  continue piperacillin/tazobactam
continue antibiotics per ID  clinically improving
s/p lap adams  bile has 2 species Klebsiella one sensitive and one resistant to piperacillin/tazobactam  E coli sensitive to piperacillin/tazobactam  antibiotics changed by ID  clinically improving

## 2023-03-06 NOTE — PROGRESS NOTE ADULT - ASSESSMENT
77 yo M CABG, hernia repair, abd pain, diarrhea  Leukocytosis, no fever  CT/USG concerning for acute cholecystitis  LFTs WNL  UA neg  Concern for cholecystitis, planned for OR  Rising WBC--suspect due to ongoing cholecystitis process  S/p OR 3/1--Lap adams gangrenous gallbladder, drained bile sent for culture  Overall, Acute cholecystitis, leukocytosis, abd pain  - Zosyn 3.375g q 8  - F/U BCXs, OR culture  - F/U surgery  - Trend WBC to normal  - Monitor for alternate sources  - Final antibiotic plan pending culture--anticipate will send patient out on PO abx based on cultures, plan for 10 day course from OR    Ashkan Cleveland MD  Contact on TEAMS messaging from 9am - 5pm  From 5pm-9am, on weekends, or if no response call [phone]
78 year old male with PMH CABG, umbilical hernia repair with mesh, DM, HTN, HLD presents to the ED with 3 days of abdominal pain, clinical presentation consistent with acute adams 
79 yo M CABG, hernia repair, abd pain, diarrhea  Leukocytosis, no fever  CT/USG concerning for acute cholecystitis  LFTs WNL  UA neg  Concern for cholecystitis, planned for OR  Rising WBC--suspect due to ongoing cholecystitis process  S/p OR 3/1--Lap adams gangrenous gallbladder, drained bile sent for culture  Kleb/E coli in culture S to Ceftriaxone/Cefoxitin  Overall, Acute cholecystitis, leukocytosis, abd pain  - Ceftriaxone/Flagyl  - When discharge planning can send out on Ceftin 500mg q 12 and Flagyl 500mg q 12 to complete 10 days from OR date  - F/U BCXs, OR culture  - F/U surgery  - Trend WBC to normal  - Monitor for alternate sources    Ashkan Cleveland MD  Contact on TEAMS messaging from 9am - 5pm  From 5pm-9am, on weekends, or if no response call [phone]
78M w/PMH of HTN. HLD, DM2, CABG, previous umbilical hernia repair with mesh, p/w acute cholecystitis. Ultrasound demonstrating distended GB with 5mm wall, 6mm CBD. Now, s/p laparoscopic cholecystectomy on 3/1. Awaiting completion of IV abx course vs. PO abx on discharge, and BRISEIDA placement.    PLAN:  - Discharge to [facility] today  - Confirm final PO abx course with ID  - c/w Ceftriaxone/Flagyl while inpatient  - Pain Control PRN  - monitor PHILLIP output  - Incentive spirometry  - Diet: Regular, Carb consistent  - DVT prophylaxis: lovenox 40mg QD  - Activity: OOB/ambulation    Red Surgery  [id number]
78M w/PMH of HTN. HLD, DM2, CABG, previous umbilical hernia repair with mesh, p/w acute cholecystitis. WBC 12k on admission, most recently 16k; LFTs WNL; ultrasound demonstrating distended GB with 5mm wall, 6mm CBD. Patient awaiting OR for laparoscopic cholecystectomy.    Plan:  - OR today (3/1) for Laparoscopic Cholecystectomy, Possible Open w/Dr. Sanchez  - NPO/IVF  - Optimized as per medicine  - Pain control PRN  - c/w Zosyn   - f/u AM labs    Red Team Surgery  [id number]
79 yo M CABG, hernia repair, abd pain, diarrhea  Leukocytosis, no fever  CT/USG concerning for acute cholecystitis  LFTs WNL  UA neg  Concern for cholecystitis, planned for OR  Rising WBC--suspect due to ongoing cholecystitis process  S/p OR 3/1--Lap adams gangrenous gallbladder, drained bile sent for culture  Overall, Acute cholecystitis, leukocytosis, abd pain  - Zosyn 3.375g q 8  - F/U BCXs, OR culture  - F/U surgery  - Trend WBC to normal  - Monitor for alternate sources  - Final antibiotic plan pending culture    Ashkan Cleveland MD  Contact on TEAMS messaging from 9am - 5pm  From 5pm-9am, on weekends, or if no response call [phone]
78M w/PMH of HTN. HLD, DM2, CABG, previous umbilical hernia repair with mesh, p/w acute cholecystitis. Ultrasound demonstrating distended GB with 5mm wall, 6mm CBD. Now, s/p laparoscopic cholecystectomy on 3/1. Awaiting completion of IV abx course vs. PO abx on discharge, and BRISEIDA placement.    PLAN:  - Pain Control PRN  - monitor PHILLIP output  - Incentive spirometry  - c/w Zosyn  - f/u ID regarding PO abx at discharge  - Diet: Regular  - DVT prophylaxis: lovenox 40mg QD  - Activity: OOB/ambulation  - Dispo: [facility]    Red Surgery  [id number]
78 year old male with PMH CABG, umbilical hernia repair with mesh, DM, HTN, HLD presents to the ED with 3 days of abdominal pain, clinical presentation consistent with acute adams 

## 2023-03-06 NOTE — PROGRESS NOTE ADULT - PROBLEM SELECTOR PLAN 5
resume statin on discharge
continue statin on discharge
resume statin on discharge

## 2023-03-06 NOTE — PROGRESS NOTE ADULT - PROBLEM SELECTOR PLAN 4
continue to monitor  BP acceptable

## 2023-03-06 NOTE — PROGRESS NOTE ADULT - ATTENDING COMMENTS
DATE OF SERVICE: 03-02-23 @ 21:33    Seen and examined. No events today.     Vitals stable  WBC 18, remainder of labs appropriate  PHILLIP serosang today    Abdomen soft mildly distended, appropriate TTP    Continue reg diet  monitor PHILLIP   FU final cx  Abx, monitor wbc  AM labs  DC planning
pt seen and examined  agree with note above  pod 4 s/p lap adams, HERMILO, drainage of SH space  resting in bed, NAD  tolerating diet, no n/v  + gi fxn  soft, nt, nd no r/g  kaylah SS  f/u labs  cont abx per ID  oob to ambulate with assistance/PT  dc planning for rehab next week  d/w pt & family @ bedside in detail .
DATE OF SERVICE: 03-03-23 @ 16:40    Seen and examined, reports RUQ pain, similar to yesterday. Tolerating diet. Flatus yesterday, no BM yet  VSS  WBC downtrending, LFTs WNL  PHILLIP serosanguinous  Abdomen soft mildly distended incisions c/d/i    Pending final Abx per cx per ID  dispo to [facility]  monitor drain  Bowel regimen
DATE OF SERVICE: 03-06-23 @ 13:50    Seen and examined. No events. Tolerating diet.     VSS  Labs stable  Abd soft, mildly distended, nontender  PHILLIP serosanuinous    Pending rehab placement  GIACOMO mcginnis JP, will remove in office  ID for abx reigmen
Date of service 3/1    Rising WBC  OR for lap adams
pt seen and examined  agree with note above  pod 3 s/p lap adams, HERMILO, drainage of SH space  resting in bed, NAD  tolerating diet, no n/v  + gi fxn  soft, nt, nd no r/g  kaylah SS  f/u labs  cont abx per ID  oob to ambulate with assistance/PT  dc planning for rehab next week  d/w pt & family @ bedside in detail

## 2023-03-06 NOTE — PROGRESS NOTE ADULT - SUBJECTIVE AND OBJECTIVE BOX
Patient is a 78y old  Male who presents with a chief complaint of acute cholecystitis (06 Mar 2023 06:51)      DATE OF SERVICE: 03-06-23 @ 14:18    SUBJECTIVE / OVERNIGHT EVENTS: overnight events noted    ROS:  Resp: No cough no sputum production  CVS: No chest pain no palpitations no orthopnea  GI: no N/V/D  : no dysuria, no hematuria          MEDICATIONS  (STANDING):  acetaminophen     Tablet .. 975 milliGRAM(s) Oral every 6 hours  aspirin enteric coated 81 milliGRAM(s) Oral daily  atorvastatin 80 milliGRAM(s) Oral at bedtime  cefTRIAXone   IVPB 1000 milliGRAM(s) IV Intermittent every 24 hours  dextrose 50% Injectable 25 Gram(s) IV Push once  dextrose 50% Injectable 12.5 Gram(s) IV Push once  dextrose 50% Injectable 25 Gram(s) IV Push once  enoxaparin Injectable 40 milliGRAM(s) SubCutaneous every 24 hours  glucagon  Injectable 1 milliGRAM(s) IntraMuscular once  insulin glargine Injectable (LANTUS) 4 Unit(s) SubCutaneous at bedtime  insulin lispro (ADMELOG) corrective regimen sliding scale   SubCutaneous Before meals and at bedtime  lisinopril 10 milliGRAM(s) Oral daily  memantine 5 milliGRAM(s) Oral daily  metroNIDAZOLE    Tablet 500 milliGRAM(s) Oral every 12 hours  oxybutynin 5 milliGRAM(s) Oral two times a day  polyethylene glycol 3350 17 Gram(s) Oral daily  senna 1 Tablet(s) Oral at bedtime    MEDICATIONS  (PRN):  dextrose Oral Gel 15 Gram(s) Oral once PRN Blood Glucose LESS THAN 70 milliGRAM(s)/deciliter  traMADol 25 milliGRAM(s) Oral every 6 hours PRN Moderate Pain (4 - 6)  traMADol 50 milliGRAM(s) Oral every 6 hours PRN Severe Pain (7 - 10)        CAPILLARY BLOOD GLUCOSE      POCT Blood Glucose.: 225 mg/dL (06 Mar 2023 12:42)  POCT Blood Glucose.: 189 mg/dL (06 Mar 2023 09:02)  POCT Blood Glucose.: 235 mg/dL (05 Mar 2023 21:41)  POCT Blood Glucose.: 246 mg/dL (05 Mar 2023 17:55)    I&O's Summary    05 Mar 2023 07:01  -  06 Mar 2023 07:00  --------------------------------------------------------  IN: 660 mL / OUT: 470 mL / NET: 190 mL    06 Mar 2023 07:01  -  06 Mar 2023 14:18  --------------------------------------------------------  IN: 650 mL / OUT: 100 mL / NET: 550 mL        Vital Signs Last 24 Hrs  T(C): 36.4 (06 Mar 2023 13:52), Max: 37 (05 Mar 2023 22:02)  T(F): 97.5 (06 Mar 2023 13:52), Max: 98.6 (05 Mar 2023 22:02)  HR: 80 (06 Mar 2023 13:52) (62 - 92)  BP: 136/72 (06 Mar 2023 13:52) (136/72 - 168/80)  BP(mean): --  RR: 18 (06 Mar 2023 13:52) (18 - 18)  SpO2: 93% (06 Mar 2023 13:52) (93% - 96%)    PHYSICAL EXAM:  GENERAL: NAD  NECK: Supple  CHEST/LUNG: Clear  HEART: S1S2; no murmurs  ABDOMEN: Soft, resolved tenderness   EXTREMITIES: no edema  PSYCH: alert at baseline   NEUROLOGY: no focal motor    LABS:                        12.6   6.42  )-----------( 276      ( 06 Mar 2023 07:20 )             38.1     03-06    135  |  99  |  16  ----------------------------<  193<H>  3.7   |  24  |  0.56    Ca    9.3      06 Mar 2023 07:20  Phos  2.6     03-06  Mg     1.8     03-06    TPro  6.4  /  Alb  2.6<L>  /  TBili  0.3  /  DBili  <0.1  /  AST  10  /  ALT  10  /  AlkPhos  96  03-06                All consultant(s) notes reviewed and care discussed with other providers        Contact Number, Dr Contreras [phone]

## 2023-03-06 NOTE — PROGRESS NOTE ADULT - PROBLEM SELECTOR PLAN 2
continue to monitor  finger sticks acceptable
continue to monitor  finger sticks acceptable  watch for hypoglycemia
continue to monitor  continue dose of Lantus 4 QHS  finger sticks acceptable
continue to monitor  continue dose of Lantus 4 QHS  finger sticks acceptable
continue to monitor  continue dose of Lantus 4 QHS
continue to monitor  finger sticks trending up and patient is eating well  add small dose of Lantus 4 QHS

## 2023-03-06 NOTE — PROGRESS NOTE ADULT - NSPROGADDITIONALINFOA_GEN_ALL_CORE
discussed with patient's family at bedside in detail
discussed with patient's family at bedside in detail  discussed with surgery attending
discussed with patient's family at bedside in detail
discussed with patient's family at bedside in detail

## 2023-03-06 NOTE — PROGRESS NOTE ADULT - REASON FOR ADMISSION
acute cholecystitis

## 2023-03-06 NOTE — PROGRESS NOTE ADULT - PROVIDER SPECIALTY LIST ADULT
Infectious Disease
Infectious Disease
Surgery
Infectious Disease
Surgery
Internal Medicine

## 2023-03-06 NOTE — PROGRESS NOTE ADULT - SUBJECTIVE AND OBJECTIVE BOX
CC: F/U for Cholecystitis    Saw/spoke to patient. No fevers, no chills. No new complaints.    Allergies  phenothiazines (Hives)  Plavix (Hives)    ANTIMICROBIALS:  cefTRIAXone   IVPB 1000 every 24 hours  metroNIDAZOLE    Tablet 500 every 12 hours    PE:    Vital Signs Last 24 Hrs  T(C): 36.4 (06 Mar 2023 13:52), Max: 37 (05 Mar 2023 22:02)  T(F): 97.5 (06 Mar 2023 13:52), Max: 98.6 (05 Mar 2023 22:02)  HR: 80 (06 Mar 2023 13:52) (62 - 92)  BP: 136/72 (06 Mar 2023 13:52) (136/72 - 168/80)  RR: 18 (06 Mar 2023 13:52) (18 - 18)  SpO2: 93% (06 Mar 2023 13:52) (93% - 96%)    Gen: AOx3, NAD, non-toxic  CV: Nontachycardic  Resp: Breathing comfortably, RA  Abd: Soft, nontender  IV/Skin: No thrombophlebitis    LABS:                        12.6   6.42  )-----------( 276      ( 06 Mar 2023 07:20 )             38.1     03-06    135  |  99  |  16  ----------------------------<  193<H>  3.7   |  24  |  0.56    Ca    9.3      06 Mar 2023 07:20  Phos  2.6     03-06  Mg     1.8     03-06    TPro  6.4  /  Alb  2.6<L>  /  TBili  0.3  /  DBili  <0.1  /  AST  10  /  ALT  10  /  AlkPhos  96  03-06    MICROBIOLOGY:    Bile Bile Fluid  03-01-23   Few Klebsiella pneumoniae Multiple Morphological Strains  Few Escherichia coli  --  Klebsiella pneumoniae  Klebsiella pneumoniae  Escherichia coli    .Blood Blood-Venous  03-01-23   No growth to date.  --  --    .Blood Blood  03-01-23   No growth to date.  --  --    Clean Catch Clean Catch (Midstream)  02-28-23   <10,000 CFU/mL Normal Urogenital Bela  --  --    Rapid RVP Result: NotDetec (03-05 @ 19:54)    (otherwise reviewed)    RADIOLOGY:    3/2 XR:    IMPRESSION:  No pneumothorax.    Unchanged right basilar atelectasis.

## 2023-03-06 NOTE — PROGRESS NOTE ADULT - PROBLEM SELECTOR PLAN 6
supportive care
supportive care  much more interactive today
supportive care  agitated yesterday le secondary to sundowning  back at baseline today
supportive care  much more interactive today

## 2023-03-06 NOTE — PROGRESS NOTE ADULT - PROBLEM SELECTOR PLAN 3
chest pain free  no intervention at this time

## 2023-03-06 NOTE — PROGRESS NOTE ADULT - PROBLEM SELECTOR PROBLEM 1
Acute cholecystitis
Acute cholecystitis
Pre-op evaluation
Acute cholecystitis
Acute cholecystitis
Pre-op evaluation

## 2023-03-08 ENCOUNTER — EMERGENCY (EMERGENCY)
Facility: HOSPITAL | Age: 78
LOS: 1 days | Discharge: HOME | End: 2023-03-08
Payer: MEDICARE

## 2023-03-08 VITALS
OXYGEN SATURATION: 94 % | SYSTOLIC BLOOD PRESSURE: 155 MMHG | DIASTOLIC BLOOD PRESSURE: 77 MMHG | TEMPERATURE: 98 F | RESPIRATION RATE: 17 BRPM | HEART RATE: 80 BPM

## 2023-03-08 VITALS
TEMPERATURE: 98 F | DIASTOLIC BLOOD PRESSURE: 76 MMHG | HEIGHT: 65 IN | OXYGEN SATURATION: 92 % | SYSTOLIC BLOOD PRESSURE: 171 MMHG | HEART RATE: 73 BPM | RESPIRATION RATE: 18 BRPM

## 2023-03-08 DIAGNOSIS — Z98.890 OTHER SPECIFIED POSTPROCEDURAL STATES: Chronic | ICD-10-CM

## 2023-03-08 DIAGNOSIS — Z95.5 PRESENCE OF CORONARY ANGIOPLASTY IMPLANT AND GRAFT: Chronic | ICD-10-CM

## 2023-03-08 DIAGNOSIS — Z95.1 PRESENCE OF AORTOCORONARY BYPASS GRAFT: Chronic | ICD-10-CM

## 2023-03-08 LAB
ALBUMIN SERPL ELPH-MCNC: 2.9 G/DL — LOW (ref 3.3–5)
ALP SERPL-CCNC: 118 U/L — SIGNIFICANT CHANGE UP (ref 40–120)
ALT FLD-CCNC: 14 U/L — SIGNIFICANT CHANGE UP (ref 10–45)
ANION GAP SERPL CALC-SCNC: 10 MMOL/L — SIGNIFICANT CHANGE UP (ref 5–17)
AST SERPL-CCNC: 16 U/L — SIGNIFICANT CHANGE UP (ref 10–40)
BASE EXCESS BLDV CALC-SCNC: 4.2 MMOL/L — HIGH (ref -2–3)
BASOPHILS # BLD AUTO: 0.02 K/UL — SIGNIFICANT CHANGE UP (ref 0–0.2)
BASOPHILS NFR BLD AUTO: 0.3 % — SIGNIFICANT CHANGE UP (ref 0–2)
BILIRUB SERPL-MCNC: 0.3 MG/DL — SIGNIFICANT CHANGE UP (ref 0.2–1.2)
BUN SERPL-MCNC: 18 MG/DL — SIGNIFICANT CHANGE UP (ref 7–23)
CA-I SERPL-SCNC: 1.23 MMOL/L — SIGNIFICANT CHANGE UP (ref 1.15–1.33)
CALCIUM SERPL-MCNC: 9.1 MG/DL — SIGNIFICANT CHANGE UP (ref 8.4–10.5)
CHLORIDE BLDV-SCNC: 99 MMOL/L — SIGNIFICANT CHANGE UP (ref 96–108)
CHLORIDE SERPL-SCNC: 100 MMOL/L — SIGNIFICANT CHANGE UP (ref 96–108)
CO2 BLDV-SCNC: 31 MMOL/L — HIGH (ref 22–26)
CO2 SERPL-SCNC: 25 MMOL/L — SIGNIFICANT CHANGE UP (ref 22–31)
CREAT SERPL-MCNC: 0.65 MG/DL — SIGNIFICANT CHANGE UP (ref 0.5–1.3)
EGFR: 98 ML/MIN/1.73M2 — SIGNIFICANT CHANGE UP
EOSINOPHIL # BLD AUTO: 0.17 K/UL — SIGNIFICANT CHANGE UP (ref 0–0.5)
EOSINOPHIL NFR BLD AUTO: 2.4 % — SIGNIFICANT CHANGE UP (ref 0–6)
FLUAV AG NPH QL: SIGNIFICANT CHANGE UP
FLUBV AG NPH QL: SIGNIFICANT CHANGE UP
GAS PNL BLDV: 132 MMOL/L — LOW (ref 136–145)
GAS PNL BLDV: SIGNIFICANT CHANGE UP
GAS PNL BLDV: SIGNIFICANT CHANGE UP
GLUCOSE BLDV-MCNC: 232 MG/DL — HIGH (ref 70–99)
GLUCOSE SERPL-MCNC: 228 MG/DL — HIGH (ref 70–99)
HCO3 BLDV-SCNC: 29 MMOL/L — SIGNIFICANT CHANGE UP (ref 22–29)
HCT VFR BLD CALC: 41.1 % — SIGNIFICANT CHANGE UP (ref 39–50)
HCT VFR BLDA CALC: 41 % — SIGNIFICANT CHANGE UP (ref 39–51)
HGB BLD CALC-MCNC: 13.7 G/DL — SIGNIFICANT CHANGE UP (ref 12.6–17.4)
HGB BLD-MCNC: 13.3 G/DL — SIGNIFICANT CHANGE UP (ref 13–17)
IMM GRANULOCYTES NFR BLD AUTO: 0.4 % — SIGNIFICANT CHANGE UP (ref 0–0.9)
LACTATE BLDV-MCNC: 1.1 MMOL/L — SIGNIFICANT CHANGE UP (ref 0.5–2)
LIDOCAIN IGE QN: 19 U/L — SIGNIFICANT CHANGE UP (ref 7–60)
LYMPHOCYTES # BLD AUTO: 0.95 K/UL — LOW (ref 1–3.3)
LYMPHOCYTES # BLD AUTO: 13.5 % — SIGNIFICANT CHANGE UP (ref 13–44)
MAGNESIUM SERPL-MCNC: 1.8 MG/DL — SIGNIFICANT CHANGE UP (ref 1.6–2.6)
MCHC RBC-ENTMCNC: 28.5 PG — SIGNIFICANT CHANGE UP (ref 27–34)
MCHC RBC-ENTMCNC: 32.4 GM/DL — SIGNIFICANT CHANGE UP (ref 32–36)
MCV RBC AUTO: 88.2 FL — SIGNIFICANT CHANGE UP (ref 80–100)
MONOCYTES # BLD AUTO: 0.56 K/UL — SIGNIFICANT CHANGE UP (ref 0–0.9)
MONOCYTES NFR BLD AUTO: 7.9 % — SIGNIFICANT CHANGE UP (ref 2–14)
NEUTROPHILS # BLD AUTO: 5.33 K/UL — SIGNIFICANT CHANGE UP (ref 1.8–7.4)
NEUTROPHILS NFR BLD AUTO: 75.5 % — SIGNIFICANT CHANGE UP (ref 43–77)
NRBC # BLD: 0 /100 WBCS — SIGNIFICANT CHANGE UP (ref 0–0)
PCO2 BLDV: 44 MMHG — SIGNIFICANT CHANGE UP (ref 42–55)
PH BLDV: 7.43 — SIGNIFICANT CHANGE UP (ref 7.32–7.43)
PLATELET # BLD AUTO: 315 K/UL — SIGNIFICANT CHANGE UP (ref 150–400)
PO2 BLDV: 24 MMHG — LOW (ref 25–45)
POTASSIUM BLDV-SCNC: 4.3 MMOL/L — SIGNIFICANT CHANGE UP (ref 3.5–5.1)
POTASSIUM SERPL-MCNC: 4.3 MMOL/L — SIGNIFICANT CHANGE UP (ref 3.5–5.3)
POTASSIUM SERPL-SCNC: 4.3 MMOL/L — SIGNIFICANT CHANGE UP (ref 3.5–5.3)
PROT SERPL-MCNC: 6.5 G/DL — SIGNIFICANT CHANGE UP (ref 6–8.3)
RBC # BLD: 4.66 M/UL — SIGNIFICANT CHANGE UP (ref 4.2–5.8)
RBC # FLD: 13.2 % — SIGNIFICANT CHANGE UP (ref 10.3–14.5)
RSV RNA NPH QL NAA+NON-PROBE: SIGNIFICANT CHANGE UP
SAO2 % BLDV: 47.7 % — LOW (ref 67–88)
SARS-COV-2 RNA SPEC QL NAA+PROBE: SIGNIFICANT CHANGE UP
SODIUM SERPL-SCNC: 135 MMOL/L — SIGNIFICANT CHANGE UP (ref 135–145)
WBC # BLD: 7.06 K/UL — SIGNIFICANT CHANGE UP (ref 3.8–10.5)
WBC # FLD AUTO: 7.06 K/UL — SIGNIFICANT CHANGE UP (ref 3.8–10.5)

## 2023-03-08 PROCEDURE — 82330 ASSAY OF CALCIUM: CPT

## 2023-03-08 PROCEDURE — 83690 ASSAY OF LIPASE: CPT

## 2023-03-08 PROCEDURE — 84132 ASSAY OF SERUM POTASSIUM: CPT

## 2023-03-08 PROCEDURE — 99284 EMERGENCY DEPT VISIT MOD MDM: CPT | Mod: 25

## 2023-03-08 PROCEDURE — 82947 ASSAY GLUCOSE BLOOD QUANT: CPT

## 2023-03-08 PROCEDURE — 84295 ASSAY OF SERUM SODIUM: CPT

## 2023-03-08 PROCEDURE — 83735 ASSAY OF MAGNESIUM: CPT

## 2023-03-08 PROCEDURE — 74177 CT ABD & PELVIS W/CONTRAST: CPT | Mod: MA

## 2023-03-08 PROCEDURE — 80053 COMPREHEN METABOLIC PANEL: CPT

## 2023-03-08 PROCEDURE — 99284 EMERGENCY DEPT VISIT MOD MDM: CPT | Mod: FS

## 2023-03-08 PROCEDURE — 87637 SARSCOV2&INF A&B&RSV AMP PRB: CPT

## 2023-03-08 PROCEDURE — 74177 CT ABD & PELVIS W/CONTRAST: CPT | Mod: 26,MA

## 2023-03-08 PROCEDURE — 85018 HEMOGLOBIN: CPT

## 2023-03-08 PROCEDURE — 82435 ASSAY OF BLOOD CHLORIDE: CPT

## 2023-03-08 PROCEDURE — 85025 COMPLETE CBC W/AUTO DIFF WBC: CPT

## 2023-03-08 PROCEDURE — 85014 HEMATOCRIT: CPT

## 2023-03-08 PROCEDURE — 82803 BLOOD GASES ANY COMBINATION: CPT

## 2023-03-08 PROCEDURE — 83605 ASSAY OF LACTIC ACID: CPT

## 2023-03-08 NOTE — ED PROVIDER NOTE - NSICDXPASTMEDICALHX_GEN_ALL_CORE_FT
PAST MEDICAL HISTORY:  CAD (coronary artery disease)     Essential hypertension     History of pancreatitis years ago    [place] (hard of hearing)     Hypercholesterolemia     Poor historian     Type 2 diabetes mellitus

## 2023-03-08 NOTE — ED PROVIDER NOTE - NSFOLLOWUPINSTRUCTIONS_ED_ALL_ED_FT
1.  Stay well hydrated  2.  Continue current home medications, including antibiotics  3.  Take Tylenol 1000mgs every 6-8 hrs as needed for pain  4.  Follow up with your surgeon, Mick Hargrove as previously scheduled  5.  Return to the ER for worsening pain, persistent vomiting or any other concerning symptoms

## 2023-03-08 NOTE — ED ADULT NURSE NOTE - OBJECTIVE STATEMENT
PT is a 78 yr old male coming from a rehab after gallbladder removal for abd pain. Pt states last Tuesday 2/28 he had his gallbladder removed with PHILLIP drain placed on the right lower quadrant. Pt endorses abd pain since the procedure- and patient's daughter noticed the drain wasn't draining a lot of fluid- and the left side of his abd was distended. Pt spoke with the rehab doc who told her the staff would give him Miralax as it could be gas (pt has not had a bm since Monday- the day of discharge). Pt currently denies abd pain- abd soft and nontender on exam. Pt surgical sites are intact without signs of infection. Pt is a/ox 3- vitals stable- afebrile.

## 2023-03-08 NOTE — CONSULT NOTE ADULT - ASSESSMENT
79 y/o M p/w pain post operatively.    -No acute findings post operatively  -Bulge is likely due to body habitus  -Continue with antibiotic course (set to end 3/11)  -Pain control    D/w Dr. Laura Miller MD  PGY-2  Red Team Surgery

## 2023-03-08 NOTE — CONSULT NOTE ADULT - ATTENDING COMMENTS
DATE OF SERVICE: 03-08-23 @ 20:45    Seen and examined. Now POD 7 s/p lap adams for gangrenous cholecystitis. DC'd on Monday. Returns to ED today c/o a bulge and distension on L abdomen. Tolerating diet. + flatus, + BM in ED.  VSS  Labs reviewed, no leukocytosis, LFTs WNL  CT showing collection in Gb fossa without rim enhancement, drain in place, subcapsular collection    Abdomen soft, NT, ND, incisions healing, PHILLIP drain serous    No CT findings to explain bulge, likely related to constipation and distension, improved after BM in ED  PHILLIP drain in place  Continue course of abx  PO trial and ok for return to rehab, should follow up with me in 1 week

## 2023-03-08 NOTE — ED PROVIDER NOTE - PROGRESS NOTE DETAILS
S: Pt states pain in the R shoulder with return to work activities since she has been off for 4 months for child birth.    O: GH distraction, STM to middle and ant deltoid, pec major mobs with motion.    A:  Pt displays relief post session.    P: Progress shoulder mobility.   Patient seen at bedside in NAD.  VSS.  Patient resting comfortably without complaints. Labs unremarkable.  CT with postop changes, no acute pathology. Surgery consulted and recommending DC home to complete Abx course with outpatient follow up.  -Garfield Barksdale PA-C

## 2023-03-08 NOTE — ED PROVIDER NOTE - PATIENT PORTAL LINK FT
You can access the FollowMyHealth Patient Portal offered by [facility] by registering at the following website: [email or web address]. By joining [employer]’s FollowMyHealth portal, you will also be able to view your health information using other applications (apps) compatible with our system.

## 2023-03-08 NOTE — ED ADULT NURSE NOTE - NS ED PATIENT SAFETY CONCERN
CALLED TO SEE IF PATIENT WOULD LIKE TO BE PUT BACK ON THE SCHEDULE FOR LABS AND TO SEE DR. DOTSON AS HE DID NOT MAKE HIS APPT IN MAY 2022. PATIENT STATES HE DOES NOT WISH TO BE PUT BACK ON THE SCHEDULE. PATIENT WILL CALL US IF HE DECIDES OTHERWISE.  
No

## 2023-03-08 NOTE — ED PROVIDER NOTE - OBJECTIVE STATEMENT
78-year-old male with past medical history of DM, HTN, CAD s/p CABG, umbilical hernia, HLD presenting with abdominal pain.  Patient's daughter at bedside provides history.  Patient recently admitted to [facility] with acute cholecystitis.  Patient underwent laparoscopic cholecystectomy which found a gangrenous gallbladder.  PHILLIP drain was placed at that time.  Patient completed course of antibiotics and was discharged to skilled nursing facility.  Last night patient's daughter noticed left-sided abdominal distention.  Patient was evaluated by the doctor at the skilled nursing facility this morning who also noticed left-sided abdominal distention, now with left-sided abdominal tenderness.  Last bowel movement was 2 days ago.  Otherwise denies fever/chills, chest pain, shortness of breath, vomiting, dysuria. Tolerating PO

## 2023-03-08 NOTE — ED ADULT NURSE NOTE - NSICDXPASTMEDICALHX_GEN_ALL_CORE_FT
PAST MEDICAL HISTORY:  CAD (coronary artery disease)     Essential hypertension     History of pancreatitis years ago    [place] (hard of hearing)     Hypercholesterolemia     Poor historian     Type 2 diabetes mellitus

## 2023-03-08 NOTE — ED PROVIDER NOTE - CARE PROVIDER_API CALL
Paul Sanchez (DO)  Surgery  [address] [facility] [address], [address]  [place] [address]  Phone: [phone]  Fax: [phone]  Follow Up Time:

## 2023-03-08 NOTE — ED PROVIDER NOTE - PHYSICAL EXAMINATION
Gen: AAO x 1-2, NAD  Skin: No rashes or lesions  HEENT: NC/AT, PERRLA, EOMI, MMM  Resp: unlabored CTAB  Cardiac: rrr s1s2  GI: mild left sided abd distention, surgical incisions CDI, PHILLIP drain in place draining tea colored fluid, +BS, Soft, +left sided abd ttp  Ext: no pedal edema, FROM in all extremities  Neuro: no focal deficits

## 2023-03-08 NOTE — ED PROVIDER NOTE - CLINICAL SUMMARY MEDICAL DECISION MAKING FREE TEXT BOX
Impression and plan: Decrease stools abdominal distention in the context of recent cholecystectomy for gangrenous cholecystitis warrants low threshold to perform CT abdomen to rule out surgical complication and/or bowel obstruction.  HPI somewhat limited by patient's dementia.      Therefore patient will be worked up with basic labs, CT abdomen,  Surgical consult, and will discuss the possibility of pulling PHILLIP drain at the discretion of surgical recommendations    ECG directly visualized by me and shows heart rate 77, QRS 78, QTc 470 no ST elevations or depressions

## 2023-03-08 NOTE — CONSULT NOTE ADULT - SUBJECTIVE AND OBJECTIVE BOX
HPI: 78M w/PMH of HTN. HLD, DM2, CABG, previous umbilical hernia repair with mesh, p/w left sided bulge in abdomen POD 7 from lap adams.     In the ED he is HDS. Labs unremarkable. CT A/P with small fluid near liver, trace pneumoperitoneum. Finishing a course of Flagy/Cefotin that he was discharged with. The patient and his wife maintain this bulge is new.    PMH: As above    PSH: As above    Allergies: Plavix, Phenothiazines    Physical exam:    /73 HR 65 SpO2 94 T 98.6 RR 16    Physical exam:    General- Sleepy older man. Mentating appropriately  Lungs- Comfortable on room air  Abdomen- Drain R side. Open cutdown with staples, c/d/i. Bulge LLQ, painful to palpation    Imaging:    ACC: [id number] EXAM: CT ABDOMEN AND PELVIS IC ORDERED BY: SIMON BARRAGAN    PROCEDURE DATE: 03/08/2023        INTERPRETATION: CLINICAL INFORMATION: Left-sided abdominal pain and distention. Status post cholecystectomy on 3/1/2023 with PHILLIP drain in place.    COMPARISON: 2/27/2023.    CONTRAST/COMPLICATIONS:  IV Contrast: Omnipaque 350 90 cc administered 10 cc discarded  Oral Contrast: NONE  Complications: None reported at time of study completion    PROCEDURE:  CT of the Abdomen and Pelvis was performed.  Sagittal and coronal reformats were performed.    FINDINGS:  LOWER CHEST: Small right and trace left pleural effusions. Coronary artery calcifications.    LIVER: A 9.2 x 2.1 cm inferior right hepatic subcapsular collection with a small focus of air  BILE DUCTS: Normal caliber.  GALLBLADDER: Cholecystectomy. A 6.5 cm collection with air bubbles and no enhancing rim may represent Surgicel. Right percutaneous PHILLIP drain in place in the gallbladder fossa.  SPLEEN: Within normal limits.  PANCREAS: Within normal limits.  ADRENALS: Within normal limits.  KIDNEYS/URETERS: Bilateral nonobstructing stones measuring up to 3 mm. Bilateral cysts. No hydronephrosis.    BLADDER: Within normal limits.  REPRODUCTIVE ORGANS: Prostate within normal limits.    BOWEL: No bowel obstruction. Appendix is normal. Colonic diverticulosis without acute diverticulitis.  PERITONEUM: Trace perihepatic fluid and pneumoperitoneum, likely postsurgical.  VESSELS: Atherosclerotic changes.  RETROPERITONEUM/LYMPH NODES: No lymphadenopathy.  ABDOMINAL WALL: Postsurgical changes. No fluid collection.  BONES: Degenerative changes.    IMPRESSION:  Status post recent cholecystectomy with an air containing collection in the gallbladder fossa which probably represents Surgicel.  A small right hepatic subcapsular fluid collection, new.  Trace perihepatic fluid and pneumoperitoneum is likely postsurgical.    --- End of Report ---

## 2023-03-08 NOTE — ED PROVIDER NOTE - ATTENDING CONTRIBUTION TO CARE
MD Alvarez:  patient seen and evaluated with the EM Fellow. I was present for key portions of the History & Physical, and I agree with the Impression & Plan.    Patient is a 87-year-old female       Medical decision making:  ECG directly visualized by me and shows heart rate 77, QRS 78, QTc 470 no ST elevations or depressions MD Alvarez:  patient seen and evaluated with the EM Fellow. I was present for key portions of the History & Physical, and I agree with the Impression & Plan.    78-year-old male, brought to the ED by EMS from rehab for evaluation of abdominal distention.    Context: Patient underwent laparoscopic cholecystectomy for gangrenous cholecystitis at [facility] 8 days ago by Alicia Sanchez and Dr. Benz.  Procedure was complicated by diaphragmatic perforation spillage of bile PHILLIP drain was left intraoperatively.  Prior to surgery the patient lived at home with family.  Baseline mental status is dementia but functional.  Since the procedure he has been somewhat debilitated    Associated symptoms: Per the patient's daughter, the patient has not had a bowel movements in the last 2 days; and she feels that the left side of his abdomen is distended.  PHILLIP drain output has been minimal although she is not sure if it is being emptied at rehab.    Vital signs: Hypertension appreciated, afebrile.    General: Elderly male, no acute distress, normocephalic atraumatic no jaundice no icterus  Neck is supple  Abdomen is soft, surgical incisions are clean dry and intact epigastric staple line is clean dry and intact.  Right upper quadrant PHILLIP drain has scant serosanguineous fluid in the bulb.  Minimal right upper quadrant tenderness to palpation left side of the abdomen appears to cause some discomfort when palpated    Impression and plan: Decrease stools abdominal distention in the context of recent cholecystectomy for gangrenous cholecystitis warrants low threshold to perform CT abdomen to rule out surgical complication and/or bowel obstruction.  HPI somewhat limited by patient's dementia.      Therefore patient will be worked up with basic labs, CT abdomen,  Surgical consult, and will discuss the possibility of pulling PHILLIP drain at the discretion of surgical recommendations    ECG directly visualized by me and shows heart rate 77, QRS 78, QTc 470 no ST elevations or depressions

## 2023-03-10 LAB — SURGICAL PATHOLOGY STUDY: SIGNIFICANT CHANGE UP

## 2023-05-02 ENCOUNTER — NON-APPOINTMENT (OUTPATIENT)
Age: 78
End: 2023-05-02

## 2023-05-02 ENCOUNTER — APPOINTMENT (OUTPATIENT)
Dept: NEUROLOGY | Facility: CLINIC | Age: 78
End: 2023-05-02
Payer: MEDICARE

## 2023-05-02 ENCOUNTER — LABORATORY RESULT (OUTPATIENT)
Age: 78
End: 2023-05-02

## 2023-05-02 VITALS
WEIGHT: 196 LBS | SYSTOLIC BLOOD PRESSURE: 108 MMHG | DIASTOLIC BLOOD PRESSURE: 65 MMHG | BODY MASS INDEX: 33.46 KG/M2 | HEART RATE: 90 BPM | HEIGHT: 64 IN

## 2023-05-02 LAB
25(OH)D3 SERPL-MCNC: 53.8 NG/ML
BASOPHILS # BLD AUTO: 0.04 K/UL
BASOPHILS NFR BLD AUTO: 0.6 %
EOSINOPHIL # BLD AUTO: 0.21 K/UL
EOSINOPHIL NFR BLD AUTO: 3.1 %
ERYTHROCYTE [SEDIMENTATION RATE] IN BLOOD BY WESTERGREN METHOD: 26 MM/HR
FOLATE SERPL-MCNC: 18.6 NG/ML
HCT VFR BLD CALC: 44.3 %
HCYS SERPL-MCNC: 15.2 UMOL/L
HGB BLD-MCNC: 14.4 G/DL
IMM GRANULOCYTES NFR BLD AUTO: 0.1 %
LYMPHOCYTES # BLD AUTO: 1.83 K/UL
LYMPHOCYTES NFR BLD AUTO: 26.8 %
MAN DIFF?: NORMAL
MCHC RBC-ENTMCNC: 28.4 PG
MCHC RBC-ENTMCNC: 32.5 GM/DL
MCV RBC AUTO: 87.4 FL
MONOCYTES # BLD AUTO: 0.59 K/UL
MONOCYTES NFR BLD AUTO: 8.7 %
NEUTROPHILS # BLD AUTO: 4.14 K/UL
NEUTROPHILS NFR BLD AUTO: 60.7 %
PLATELET # BLD AUTO: 305 K/UL
RBC # BLD: 5.07 M/UL
RBC # FLD: 14.2 %
T PALLIDUM AB SER QL IA: NEGATIVE
T3FREE SERPL-MCNC: 2.31 PG/ML
T4 FREE SERPL-MCNC: 1.3 NG/DL
TSH SERPL-ACNC: 1.6 UIU/ML
VIT B12 SERPL-MCNC: 957 PG/ML
WBC # FLD AUTO: 6.82 K/UL

## 2023-05-02 PROCEDURE — 99204 OFFICE O/P NEW MOD 45 MIN: CPT

## 2023-05-02 NOTE — PHYSICAL EXAM
[FreeTextEntry1] : Constitutional:  Patient was elderly but in no acute distress. \par \par Head:  Normocephalic, atraumatic. Tympanic membranes were clear. \par \par Neck:  Supple with full range of motion. \par \par Cardiovascular:  Cardiac rhythm was regular without murmur. There were no carotid bruits. Peripheral pulses were full and symmetric. \par \par Respiratory:  Lungs were clear. \par \par Abdomen:  Soft and nontender. \par \par Spine:  Nontender. \par \par Skin:  There were no rashes. \par \par NEUROLOGICAL EXAMINATION:\par \par Mental Status: Patient was awake but apathetic. Speech was fluent. There was no dysarthria.  He was not actively hallucinating.  He scored 19 out of 30 on MMSE.  He was severely disoriented to the date and had poor attention span.  He recalled 2 of 3 words after several minutes and had difficulty copying interlocking pentagons.  Handwriting was not micrographic.\par \par Cranial Nerves: \par \par II: He could finger count bilaterally. Pupils were equal and reactive. Visual fields were full. Funduscopic examination was normal. \par \par III, IV, VI:  Eye movements were full without nystagmus. \par \par V: Facial sensation was intact. \par \par VII: Facial strength was normal. \par \par VIII: Hearing was equal. \par \par IX, X: Palatal movement was normal. Phonation was normal. \par \par XI: Sternocleidomastoids and trapezii were normal. \par \par XII: Tongue was midline and movements normal. There was no lingual atrophy or fasciculations. \par \par Motor Examination: Muscle bulk, tone and strength were normal.  There was no tremor or rigidity.  He exhibited mild choreiform movements of his limbs.\par \par Sensory Examination: Pinprick and joint position sense were intact.  Vibration sense was diminished in the feet.\par \par Reflexes: DTRs were 1 at the biceps and knees and absent elsewhere.\par \par Plantar Responses: Plantar responses were flexor. \par \par Coordination/Cerebellar Function: There was no dysmetria on finger to nose testing. \par \par Gait/Stance: Posture was stooped and stride short.  Turns were decomposed.

## 2023-05-02 NOTE — ASSESSMENT
[FreeTextEntry1] : Mr. Lewis is a 78-year-old with subacute progressive dementia manifested by memory loss and disinhibition.  He also has significant gait and bladder dysfunction.  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  These are likely contributing to his cognitive difficulties but a superimposed neurodegenerative disorder is suspected.\par \par I suggested that he undergo a comprehensive serologic evaluation and an FDG PET scan of the brain.  I will communicate with his cardiologist Dr. Morocho.  A cardiac source of emboli should be excluded if not already done.  He should continue with aggressive cardiovascular prophylactic regimen.  Further management will depend upon the above results and his clinical course.

## 2023-05-02 NOTE — REVIEW OF SYSTEMS
[Confused or Disoriented] : confusion [Memory Lapses or Loss] : memory loss [Decr. Concentrating Ability] : decreased concentrating ability [Difficulty Walking] : difficulty walking [Negative] : Heme/Lymph

## 2023-05-02 NOTE — CONSULT LETTER
[Dear  ___] : Dear  [unfilled], [Consult Letter:] : I had the pleasure of evaluating your patient, [unfilled]. [Please see my note below.] : Please see my note below. [Consult Closing:] : Thank you very much for allowing me to participate in the care of this patient.  If you have any questions, please do not hesitate to contact me. [Sincerely,] : Sincerely, [FreeTextEntry3] : Guillaume Salcedo MD\par

## 2023-05-02 NOTE — HISTORY OF PRESENT ILLNESS
[FreeTextEntry1] : Mr. Ranjit Lewis is a 78-year-old right-handed patient who was referred for neurologic evaluation at your kind suggestion.  He was accompanied by Mrs. Lewis and their daughter Jess ([phone]).\par \par Mr. Lewis suffers from hypertension, diabetes, hyperlipidemia, coronary artery disease and congestive heart failure.  He underwent a coronary artery bypass 20 years ago.  There is no history of clinical stroke.\par \par In 2021, he was first noted to be a bit forgetful.  More recently, he has been sexually inappropriate.  He has longstanding poor bladder control.  Following hospitalization for cholecystectomy in February 2023, he began wandering.  He does not hallucinate and is not paranoid.  He is able to dress and toilet himself but requires supervision with bathing.  Of note, his father suffered from dementia with onset in his mid 70s.\par \par He was evaluated by a neurologist in [place].  MRI of the brain performed in December 2022 revealed a chronic right anterior frontal hemorrhagic infarction and a chronic left posterior frontal infarction.  There is mild to moderate chronic microvascular change.  There was diffuse cerebral and cerebellar atrophy.  MR angiography of the neck revealed 50% narrowing of the origin of the left ICA.  MRA of the brain was normal.  Routine EEG was normal.  He was prescribed memantine 5 mg/day.\par \par Past surgical history is notable for CABG, cholecystectomy, herniorrhaphy, left knee procedure and LASEK surgery.  He suffers from hypertension, diabetes, hyperlipidemia, coronary artery disease, congestive heart failure and diverticular disease.  There is no history of pulmonary, renal, hepatic, thyroid or hematologic disease.\par He has an allergy to clopidogrel.  Medications include memantine 5 mg at bedtime, baby aspirin, metformin, oxybutynin, acidophilus, Trulicity and lisinopril.  He is a non-smoker and nondrinker.  He is  and is a retired .  Family history is notable for a mother with breast cancer and a father with dementia.

## 2023-05-03 LAB
ALBUMIN SERPL ELPH-MCNC: 4.4 G/DL
ALP BLD-CCNC: 71 U/L
ALT SERPL-CCNC: 7 U/L
ANACR T: NEGATIVE
ANION GAP SERPL CALC-SCNC: 18 MMOL/L
AST SERPL-CCNC: 12 U/L
BILIRUB SERPL-MCNC: 0.4 MG/DL
BUN SERPL-MCNC: 27 MG/DL
CALCIUM SERPL-MCNC: 10.5 MG/DL
CHLORIDE SERPL-SCNC: 97 MMOL/L
CHOLEST SERPL-MCNC: 142 MG/DL
CO2 SERPL-SCNC: 21 MMOL/L
CREAT SERPL-MCNC: 0.86 MG/DL
CRP SERPL-MCNC: 28 MG/L
EGFR: 90 ML/MIN/1.73M2
GLUCOSE SERPL-MCNC: 122 MG/DL
HDLC SERPL-MCNC: 32 MG/DL
LDLC SERPL CALC-MCNC: 70 MG/DL
NONHDLC SERPL-MCNC: 110 MG/DL
POTASSIUM SERPL-SCNC: 4.7 MMOL/L
PROT SERPL-MCNC: 7.4 G/DL
SODIUM SERPL-SCNC: 136 MMOL/L
TRIGL SERPL-MCNC: 199 MG/DL

## 2023-05-05 LAB — METHYLMALONATE SERPL-SCNC: 111 NMOL/L

## 2023-05-06 LAB
ALBUMIN MFR SERPL ELPH: 46.6 %
ALBUMIN SERPL-MCNC: 3.4 G/DL
ALBUMIN/GLOB SERPL: 0.8 RATIO
ALPHA1 GLOB MFR SERPL ELPH: 6.6 %
ALPHA1 GLOB SERPL ELPH-MCNC: 0.5 G/DL
ALPHA2 GLOB MFR SERPL ELPH: 16.1 %
ALPHA2 GLOB SERPL ELPH-MCNC: 1.2 G/DL
B-GLOBULIN MFR SERPL ELPH: 13.8 %
B-GLOBULIN SERPL ELPH-MCNC: 1 G/DL
DEPRECATED KAPPA LC FREE/LAMBDA SER: 1.7 RATIO
GAMMA GLOB FLD ELPH-MCNC: 1.3 G/DL
GAMMA GLOB MFR SERPL ELPH: 16.9 %
IGA SER QL IEP: 370 MG/DL
IGG SER QL IEP: 1275 MG/DL
IGM SER QL IEP: 146 MG/DL
INTERPRETATION SERPL IEP-IMP: NORMAL
KAPPA LC CSF-MCNC: 4.15 MG/DL
KAPPA LC SERPL-MCNC: 7.05 MG/DL
M PROTEIN MFR SERPL ELPH: NORMAL
M PROTEIN SPEC IFE-MCNC: NORMAL
MONOCLON BAND OBS SERPL: NORMAL
PROT SERPL-MCNC: 7.4 G/DL
PROT SERPL-MCNC: 7.4 G/DL
VIT B1 SERPL-MCNC: 136.3 NMOL/L

## 2023-05-09 LAB — IGA 24H UR QL IFE: NORMAL

## 2023-05-17 LAB
AMPA-R ABCBA: NEGATIVE
AMPHIPHYSIN IGG TITR SER IF: NEGATIVE
ANNOTATION COMMENT IMP: NORMAL
CASPR2-IGG CBA, S: NEGATIVE
CV2 IGG TITR SER: NEGATIVE
GABA-B ABCBA: NEGATIVE
GAD65 AB SER-MCNC: 0.06 NMOL/L
GENETICIST REVIEW: NORMAL
GLIAL NUC TYPE 1 AB TITR SER: NEGATIVE
HTT GENE CAG RPT ENTNUM BLD/T: NORMAL
HU1 AB TITR SER: NEGATIVE
HU2 AB TITR SER IF: NEGATIVE
HU3 AB TITR SER: NEGATIVE
HUNTINGTON DISEASE ANALYSIS REFERRAL REASON: NORMAL
IGLON5 IFA, S: NEGATIVE
IMMUNOLOGIST REVIEW: NORMAL
LGI1-IGG CBA, S: NEGATIVE
MOL DX INTERP BLD/T QL: NEGATIVE
NIF IFA, S: NEGATIVE
NMDA-R ABCBA: NEGATIVE
PCA-1 AB TITR SER: NEGATIVE
PCA-2 AB TITR SER: NEGATIVE
PCA-TR AB TITR SER: NEGATIVE
PROVIDER SIGNING NAME: NORMAL
SPECIMEN SOURCE: NORMAL

## 2023-06-09 ENCOUNTER — OUTPATIENT (OUTPATIENT)
Dept: OUTPATIENT SERVICES | Facility: HOSPITAL | Age: 78
LOS: 1 days | End: 2023-06-09
Payer: MEDICARE

## 2023-06-09 ENCOUNTER — APPOINTMENT (OUTPATIENT)
Dept: NUCLEAR MEDICINE | Facility: IMAGING CENTER | Age: 78
End: 2023-06-09
Payer: MEDICARE

## 2023-06-09 DIAGNOSIS — F03.90 UNSPECIFIED DEMENTIA WITHOUT BEHAVIORAL DISTURBANCE: ICD-10-CM

## 2023-06-09 DIAGNOSIS — Z95.1 PRESENCE OF AORTOCORONARY BYPASS GRAFT: Chronic | ICD-10-CM

## 2023-06-09 DIAGNOSIS — Z98.890 OTHER SPECIFIED POSTPROCEDURAL STATES: Chronic | ICD-10-CM

## 2023-06-09 DIAGNOSIS — I63.9 CEREBRAL INFARCTION, UNSPECIFIED: ICD-10-CM

## 2023-06-09 DIAGNOSIS — Z95.5 PRESENCE OF CORONARY ANGIOPLASTY IMPLANT AND GRAFT: Chronic | ICD-10-CM

## 2023-06-09 PROCEDURE — 78608 BRAIN IMAGING (PET): CPT | Mod: 26,MH

## 2023-06-09 PROCEDURE — A9552: CPT

## 2023-06-09 PROCEDURE — 78608 BRAIN IMAGING (PET): CPT

## 2023-06-11 ENCOUNTER — NON-APPOINTMENT (OUTPATIENT)
Age: 78
End: 2023-06-11

## 2023-06-26 ENCOUNTER — APPOINTMENT (OUTPATIENT)
Dept: NEUROLOGY | Facility: CLINIC | Age: 78
End: 2023-06-26
Payer: MEDICARE

## 2023-06-26 ENCOUNTER — TRANSCRIPTION ENCOUNTER (OUTPATIENT)
Age: 78
End: 2023-06-26

## 2023-06-26 VITALS
DIASTOLIC BLOOD PRESSURE: 72 MMHG | HEIGHT: 64 IN | HEART RATE: 65 BPM | SYSTOLIC BLOOD PRESSURE: 113 MMHG | BODY MASS INDEX: 27.14 KG/M2 | WEIGHT: 159 LBS

## 2023-06-26 DIAGNOSIS — G30.9 ALZHEIMER'S DISEASE, UNSPECIFIED: ICD-10-CM

## 2023-06-26 DIAGNOSIS — F02.80 ALZHEIMER'S DISEASE, UNSPECIFIED: ICD-10-CM

## 2023-06-26 PROCEDURE — 99214 OFFICE O/P EST MOD 30 MIN: CPT

## 2023-06-26 RX ORDER — RIVASTIGMINE 4.6 MG/24H
4.6 PATCH, EXTENDED RELEASE TRANSDERMAL
Qty: 90 | Refills: 3 | Status: ACTIVE | COMMUNITY
Start: 2023-06-26 | End: 1900-01-01

## 2023-07-05 ENCOUNTER — NON-APPOINTMENT (OUTPATIENT)
Age: 78
End: 2023-07-05

## 2023-07-19 RX ORDER — DONEPEZIL HYDROCHLORIDE 5 MG/1
5 TABLET ORAL
Qty: 90 | Refills: 3 | Status: ACTIVE | COMMUNITY
Start: 2023-07-19 | End: 1900-01-01

## 2023-07-19 NOTE — CONSULT LETTER
[Dear  ___] : Dear  [unfilled], [Consult Letter:] : I had the pleasure of evaluating your patient, [unfilled]. [Please see my note below.] : Please see my note below. [Consult Closing:] : Thank you very much for allowing me to participate in the care of this patient.  If you have any questions, please do not hesitate to contact me. [Sincerely,] : Sincerely, [DrMick  ___] : Dr. BALLARD [FreeTextEntry3] : Guillaume Salcedo MD\par

## 2023-07-19 NOTE — ASSESSMENT
[FreeTextEntry1] : Mr. Lewis is a 78-year-old with subacute progressive dementia manifested by memory loss and disinhibition.  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  There is evidence of cerebrovascular disease.  In addition, FDG PET scan was consistent with advanced neurodegeneration most likely due to Alzheimer's disease.\par \par I suggested aggressive cardiovascular prophylactic measures and exclusion of a cardiac source of emboli.  He is under the care of Dr. Morocho.\par \par I suggested a trial of rivastigmine patch 4.6 mg per 24 hours beginning with treatment on Mondays, Wednesdays and Fridays.  If tolerated after 2 weeks, the dose can be increased to daily.  Potential side effects were explained. 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 Brittney Lucas was recommended.\par \par Jess will be the spokesperson.  I suggested a follow-up visit in about 3 to 4 months.  Telephone contact will be maintained in the meantime.

## 2023-07-19 NOTE — PHYSICAL EXAM
[FreeTextEntry1] : Constitutional:  Patient was elderly but in no acute distress. \par \par Head:  Normocephalic, atraumatic. Tympanic membranes were clear. \par \par Neck:  Supple with full range of motion. \par \par Cardiovascular:  Cardiac rhythm was regular without murmur. There were no carotid bruits. Peripheral pulses were full and symmetric. \par \par Respiratory:  Lungs were clear. \par \par Abdomen:  Soft and nontender. \par \par Spine:  Nontender. \par \par Skin:  There were no rashes. \par \par NEUROLOGICAL EXAMINATION:\par \par Mental Status: Patient was awake but apathetic. Speech was fluent. There was no dysarthria.  He was oriented to person and doctors office only.\par \par Cranial Nerves: \par \par II: He could finger count bilaterally. Pupils were equal and reactive. Visual fields were full.  Fundi were not visualized.\par \par III, IV, VI:  Eye movements were full without nystagmus. \par \par V: Facial sensation was intact. \par \par VII: Facial strength was normal. \par \par VIII: Hearing was equal. \par \par IX, X: Palatal movement was normal. Phonation was normal. \par \par XI: Sternocleidomastoids and trapezii were normal. \par \par XII: Tongue was midline and movements normal. There was no lingual atrophy or fasciculations. \par \par Motor Examination: Muscle bulk, tone and strength were normal.  There was no tremor or rigidity.  He exhibited mild choreiform movements of his limbs.\par \par Sensory Examination: Pinprick and joint position sense were intact.  Vibration sense was diminished in the feet.\par \par Reflexes: DTRs were 1 at the biceps and knees and absent elsewhere.\par \par Plantar Responses: Plantar responses were flexor. \par \par Coordination/Cerebellar Function: There was no dysmetria on finger to nose testing. \par \par Gait/Stance: Posture was stooped and stride short.  Turns were decomposed.

## 2023-07-19 NOTE — HISTORY OF PRESENT ILLNESS
[FreeTextEntry1] : Mr. Ranjit Lewis returned to the office having been initially evaluated on May 2, 2023.  He is a 78-year-old right-handed patient who was referred for neurologic evaluation at your kind suggestion.  He was accompanied by Mrs. Lewis and their daughter Jess ([phone]).\par \par Mr. Lewis suffers from hypertension, diabetes, hyperlipidemia, coronary artery disease and congestive heart failure.  He underwent a coronary artery bypass 20 years ago.  There is no history of clinical stroke.\par \par In 2021, he was first noted to be a bit forgetful.  More recently, he has been sexually inappropriate.  He has longstanding poor bladder control.  Following hospitalization for cholecystectomy in February 2023, he began wandering.  He does not hallucinate and is not paranoid.  He is able to dress and toilet himself but requires supervision with bathing.  Of note, his father suffered from dementia with onset in his mid 70s.\par \par He was evaluated by a neurologist in [place].  MRI of the brain performed in December 2022 revealed a chronic right anterior frontal hemorrhagic infarction and a chronic left posterior frontal infarction.  There is mild to moderate chronic microvascular change.  There was diffuse cerebral and cerebellar atrophy.  MR angiography of the neck revealed 50% narrowing of the origin of the left ICA.  MRA of the brain was normal.  Routine EEG was normal.  He was prescribed memantine 5 mg/day.\par \par He scored 19 out of 30 on MMSE.  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  A superimposed neurodegenerative disorder was suspected.\par \par A serologic evaluation was notable for a weak IgM lambda monoclonal band.  CBC and calcium were normal.\par \par FDG PET scan revealed abnormal hypometabolism particularly pronounced in the frontal and parietal temporal regions consistent with an underlying advanced neurodegenerative disorder.  It was difficult to differentiate Alzheimer's disease from frontotemporal dementia.  Alzheimer's disease was favored due to involvement of the precuneus and posterior cingulate gyrus.  Behavioral variant Alzheimer's disease should also be considered.  There was a significant vascular burden.\par \par Past surgical history is notable for CABG, cholecystectomy, herniorrhaphy, left knee procedure and LASEK surgery.  He suffers from hypertension, diabetes, hyperlipidemia, coronary artery disease, congestive heart failure and diverticular disease.  There is no history of pulmonary, renal, hepatic, thyroid or hematologic disease.\par He has an allergy to clopidogrel.  Medications include memantine 5 mg at bedtime, baby aspirin, metformin, oxybutynin, acidophilus, Trulicity and lisinopril.  He is a non-smoker and nondrinker.  He is  and is a retired .  Family history is notable for a mother with breast cancer and a father with dementia.

## 2023-08-10 ENCOUNTER — OUTPATIENT (OUTPATIENT)
Dept: OUTPATIENT SERVICES | Facility: HOSPITAL | Age: 78
LOS: 1 days | Discharge: HOME | End: 2023-08-10

## 2023-08-10 DIAGNOSIS — Z98.890 OTHER SPECIFIED POSTPROCEDURAL STATES: Chronic | ICD-10-CM

## 2023-08-10 DIAGNOSIS — Z95.1 PRESENCE OF AORTOCORONARY BYPASS GRAFT: Chronic | ICD-10-CM

## 2023-08-10 DIAGNOSIS — D47.2 MONOCLONAL GAMMOPATHY: ICD-10-CM

## 2023-08-10 DIAGNOSIS — Z95.5 PRESENCE OF CORONARY ANGIOPLASTY IMPLANT AND GRAFT: Chronic | ICD-10-CM

## 2023-08-14 ENCOUNTER — APPOINTMENT (OUTPATIENT)
Dept: HEMATOLOGY ONCOLOGY | Facility: CLINIC | Age: 78
End: 2023-08-14

## 2023-10-17 ENCOUNTER — APPOINTMENT (OUTPATIENT)
Dept: NEUROLOGY | Facility: CLINIC | Age: 78
End: 2023-10-17

## 2024-02-26 ENCOUNTER — APPOINTMENT (OUTPATIENT)
Dept: NEUROLOGY | Facility: CLINIC | Age: 79
End: 2024-02-26
Payer: MEDICARE

## 2024-02-26 VITALS
HEIGHT: 64 IN | BODY MASS INDEX: 30.05 KG/M2 | SYSTOLIC BLOOD PRESSURE: 146 MMHG | WEIGHT: 176 LBS | HEART RATE: 66 BPM | DIASTOLIC BLOOD PRESSURE: 70 MMHG

## 2024-02-26 DIAGNOSIS — F03.90 UNSPECIFIED DEMENTIA W/OUT BEHAVIORAL DISTURBANCE: ICD-10-CM

## 2024-02-26 DIAGNOSIS — D47.2 MONOCLONAL GAMMOPATHY: ICD-10-CM

## 2024-02-26 DIAGNOSIS — I63.9 CEREBRAL INFARCTION, UNSPECIFIED: ICD-10-CM

## 2024-02-26 PROCEDURE — 99213 OFFICE O/P EST LOW 20 MIN: CPT

## 2024-02-26 RX ORDER — ESCITALOPRAM OXALATE 5 MG/1
5 TABLET ORAL DAILY
Qty: 90 | Refills: 3 | Status: ACTIVE | COMMUNITY
Start: 2024-02-26 | End: 1900-01-01

## 2024-02-26 NOTE — PHYSICAL EXAM
[FreeTextEntry1] : Constitutional:  Patient was elderly but in no acute distress.   Head:  Normocephalic, atraumatic. Tympanic membranes were not examined.  Neck:  Supple with full range of motion.   Cardiovascular:  Cardiac rhythm was regular without murmur. There were no carotid bruits. Peripheral pulses were full and symmetric.   Respiratory:  Lungs were clear.   Abdomen:  Soft and nontender.   Spine:  Nontender.   Skin:  There were no rashes.   NEUROLOGICAL EXAMINATION:  Mental Status: Patient was awake but apathetic. Speech was fluent. There was no dysarthria.  He was oriented to person and doctor's office only.  He scored 21 out of 30 on MMSE making for errors in orientation.  He provided 4 out of 5 letters spelling world backwards.  He recalled none of 3 words.  Cranial Nerves:   II: He could finger count bilaterally. Pupils were equal and reactive. Visual fields were full.  Fundi were not visualized.  III, IV, VI:  Eye movements were full without nystagmus.   V: Facial sensation was intact.   VII: Facial strength was normal.   VIII: Hearing was equal.   IX, X: Palatal movement was normal. Phonation was normal.   XI: Sternocleidomastoids and trapezii were normal.   XII: Tongue was midline and movements normal. There was no lingual atrophy or fasciculations.   Motor Examination: Muscle bulk, tone and strength were normal.  There was no tremor or rigidity.  He exhibited mild choreiform movements of his limbs.  Sensory Examination: Pinprick and joint position sense were intact.  Vibration sense was diminished in the feet.  Reflexes: DTRs were 1 at the biceps and knees and absent elsewhere.  Plantar Responses: Plantar responses were flexor.   Coordination/Cerebellar Function: There was no dysmetria on finger to nose testing.   Gait/Stance: Posture was stooped and stride short.  Turns were decomposed.

## 2024-02-26 NOTE — CONSULT LETTER
[Dear  ___] : Dear  [unfilled], [Consult Letter:] : I had the pleasure of evaluating your patient, [unfilled]. [Please see my note below.] : Please see my note below. [Sincerely,] : Sincerely, [Consult Closing:] : Thank you very much for allowing me to participate in the care of this patient.  If you have any questions, please do not hesitate to contact me. [FreeTextEntry3] : Guillaume Salcedo MD\par

## 2024-02-26 NOTE — REVIEW OF SYSTEMS
[Confused or Disoriented] : confusion [Memory Lapses or Loss] : memory loss [Decr. Concentrating Ability] : decreased concentrating ability [Difficulty Walking] : difficulty walking [Negative] : Endocrine

## 2024-02-26 NOTE — ASSESSMENT
[FreeTextEntry1] : Mr. Lewis is a 79-year-old with subacute progressive dementia manifested by memory loss and disinhibition.  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  There is evidence of cerebrovascular disease.  In addition, FDG PET scan was consistent with advanced neurodegeneration most likely due to Alzheimer's disease.  Unfortunately, it does not appear that he underwent a cardiac evaluation regarding his possible embolic strokes.  I again suggested such an evaluation.  He also did not undergo an evaluation for his monoclonal gammopathy.  I again suggested a hematology consultation.  In the meantime, he may continue low-dose donepezil.  Because of complaints of melancholia, I prescribed escitalopram 5 mg/day.  I suggested a routine follow-up visit in 6 months.  Telephone contact will be maintained in the meantime.

## 2024-02-26 NOTE — HISTORY OF PRESENT ILLNESS
[FreeTextEntry1] : Mr. Ranjit Lewis returned to the office having been initially evaluated on June 26, 2023.  He is a 78-year-old right-handed patient who suffers from hypertension, diabetes, hyperlipidemia, coronary artery disease and congestive heart failure.  He underwent a coronary artery bypass 20 years ago.  There is no history of clinical stroke.  In 2021, he was first noted to be a bit forgetful.  More recently, he has been sexually inappropriate.  He has longstanding poor bladder control.  Following hospitalization for cholecystectomy in February 2023, he began wandering.  He does not hallucinate and is not paranoid.  He is able to dress and toilet himself but requires supervision with bathing.  Of note, his father suffered from dementia with onset in his mid 70s.  He was evaluated by a neurologist in [place].  MRI of the brain performed in December 2022 revealed a chronic right anterior frontal hemorrhagic infarction and a chronic left posterior frontal infarction.  There is mild to moderate chronic microvascular change.  There was diffuse cerebral and cerebellar atrophy.  MR angiography of the neck revealed 50% narrowing of the origin of the left ICA.  MRA of the brain was normal.  Routine EEG was normal.  He was prescribed memantine 5 mg/day.  At his May 2023 visit, he scored 19 out of 30 on MMSE.  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  A superimposed neurodegenerative disorder was suspected.  A serologic evaluation was notable for a weak IgM lambda monoclonal band.  CBC and calcium were normal.  FDG PET scan revealed abnormal hypometabolism particularly pronounced in the frontal and parietal temporal regions consistent with an underlying advanced neurodegenerative disorder.  It was difficult to differentiate Alzheimer's disease from frontotemporal dementia.  Alzheimer's disease was favored due to involvement of the precuneus and posterior cingulate gyrus.  Behavioral variant Alzheimer's disease should also be considered.  There was a significant vascular burden.  I had recommended follow-up with his cardiologist Dr. Thor Morocho.  I have also recommended consultation with Dr. Brittney Lucas regarding his monoclonal gammopathy.  Neither was accomplished.  He was accompanied to the office today by his daughter Jess ([phone]).  Sometime following his last visit, he underwent a cholecystectomy.  In October, she believes that he developed right ophthalmic zoster.  There was a delay in treatment but he recovered.  She reports that he is sometimes tearful and depressed.  He ambulates with a cane.  He is confused at night.  Past surgical history is notable for CABG, cholecystectomy, herniorrhaphy, left knee procedure and LASEK surgery.  He suffers from hypertension, diabetes, hyperlipidemia, coronary artery disease, congestive heart failure and diverticular disease.  There is no history of pulmonary, renal, hepatic, thyroid or hematologic disease. He has an allergy to clopidogrel.  Medications include Donepezil 5 mg daily, vitamin B12, Ecotrin, fenofibrate, metformin, omega-3 fatty acids, oxybutynin, ramipril, rosuvastatin, Trulicity and vitamin D3.  He is a non-smoker and nondrinker.  He is  and is a retired .  Family history is notable for a mother with breast cancer and a father with dementia.

## 2024-05-16 NOTE — ED ADULT NURSE NOTE - CAS DISCH BELONGINGS RETURNED
"  UROLOGY PROGRESS NOTE   Patient Identifiers: Jose Juan Quintana (MRN [id number])  Date of Service: 5/16/2024    Subjective:   82-year-old man history of BPH and elevated PSA.  He had 2 negative biopsies in the past previously seen by myself and Dr. Chun.  He has been on tamsulosin but has some urge leak.  Bladder appears to empty well.  Recent PSA 5.61.    Reason for visit: BPH and elevated PSA follow-up    Objective:     VITALS:    Vitals:    05/16/24 1032   BP: 124/68   Pulse: 70   SpO2: 96%           LABS:  Lab Results   Component Value Date    HGB 14.7 05/02/2024    HCT 44.1 05/02/2024    WBC 5.88 05/02/2024     05/02/2024   ]    Lab Results   Component Value Date     12/30/2015    K 4.1 05/02/2024     05/02/2024    CO2 27 05/02/2024    BUN 20 05/02/2024    CREATININE 0.90 05/02/2024    CALCIUM 9.3 05/02/2024    GLUCOSE 105 12/30/2015   ]        INPATIENT MEDS:    Current Outpatient Medications:     B Complex Vitamins (VITAMIN B-COMPLEX PO), Take by mouth, Disp: , Rfl:     Cholecalciferol (VITAMIN D-3 PO), Take by mouth, Disp: , Rfl:     finasteride (PROSCAR) 5 mg tablet, Take 1 tablet (5 mg total) by mouth daily, Disp: 90 tablet, Rfl: 3    GLUCOSAMINE-CHONDROITIN-CA-D3 PO, Take by mouth, Disp: , Rfl:     Multiple Vitamin (MULTIVITAMIN) tablet, Take 1 tablet by mouth daily, Disp: , Rfl:     tadalafil (CIALIS) 5 MG tablet, Take 1 tablet (5 mg total) by mouth in the morning, Disp: 90 tablet, Rfl: 1    tamsulosin (FLOMAX) 0.4 mg, Take 1 capsule (0.4 mg total) by mouth daily with dinner, Disp: 90 capsule, Rfl: 3    meclizine (ANTIVERT) 25 mg tablet, Take 1 tablet (25 mg total) by mouth 3 (three) times a day as needed for dizziness (Patient not taking: Reported on 5/16/2024), Disp: 30 tablet, Rfl: 0      Physical Exam:   /68 (BP Location: Left arm, Patient Position: Sitting, Cuff Size: Standard)   Pulse 70   Ht 5' 7\" (1.702 m)   Wt 77.2 kg (170 lb 3.2 oz)   SpO2 96%   BMI 26.66 kg/m² "   GEN: no acute distress    RESP: breathing comfortably with no accessory muscle use    ABD: soft, non-tender, non-distended   INCISION:    EXT: no significant peripheral edema   (Male): Penis circumcised, phallus normal, meatus patent.  Testicles descended into scrotum bilaterally without masses nor tenderness.  No inguinal hernias bilaterally.  ROBERTO: Prostate is enlarged at 45 grams. The prostate is not boggy. The prostate is not tender.  No nodules noted      RADIOLOGY:   None    Assessment:   #1.  BPH  #2.  Elevated PSA    Plan:   -Will add finasteride which he had previously declined  -No concerning findings on his prostate exam will no longer get PSAs  -Follow-up in 6 months to recheck his symptoms  -           Not applicable

## 2024-06-06 ENCOUNTER — NON-APPOINTMENT (OUTPATIENT)
Age: 79
End: 2024-06-06

## 2024-12-12 ENCOUNTER — APPOINTMENT (OUTPATIENT)
Dept: NEUROLOGY | Facility: CLINIC | Age: 79
End: 2024-12-12
Payer: MEDICARE

## 2024-12-12 VITALS
HEIGHT: 64 IN | SYSTOLIC BLOOD PRESSURE: 145 MMHG | HEART RATE: 60 BPM | DIASTOLIC BLOOD PRESSURE: 64 MMHG | WEIGHT: 174 LBS | BODY MASS INDEX: 29.71 KG/M2

## 2024-12-12 DIAGNOSIS — D47.2 MONOCLONAL GAMMOPATHY: ICD-10-CM

## 2024-12-12 DIAGNOSIS — F03.90 UNSPECIFIED DEMENTIA W/OUT BEHAVIORAL DISTURBANCE: ICD-10-CM

## 2024-12-12 PROCEDURE — 99213 OFFICE O/P EST LOW 20 MIN: CPT

## 2024-12-17 ENCOUNTER — LABORATORY RESULT (OUTPATIENT)
Age: 79
End: 2024-12-17

## 2024-12-17 ENCOUNTER — NON-APPOINTMENT (OUTPATIENT)
Age: 79
End: 2024-12-17

## 2024-12-17 LAB
ALBUMIN SERPL ELPH-MCNC: 4.2 G/DL
ALP BLD-CCNC: 67 U/L
ALT SERPL-CCNC: 7 U/L
ANION GAP SERPL CALC-SCNC: 17 MMOL/L
AST SERPL-CCNC: 14 U/L
BASOPHILS # BLD AUTO: 0.02 K/UL
BASOPHILS NFR BLD AUTO: 0.4 %
BILIRUB SERPL-MCNC: 0.2 MG/DL
BUN SERPL-MCNC: 20 MG/DL
CALCIUM SERPL-MCNC: 10.2 MG/DL
CHLORIDE SERPL-SCNC: 99 MMOL/L
CO2 SERPL-SCNC: 19 MMOL/L
CREAT SERPL-MCNC: 1.07 MG/DL
EGFR: 71 ML/MIN/1.73M2
EOSINOPHIL # BLD AUTO: 0.23 K/UL
EOSINOPHIL NFR BLD AUTO: 4.2 %
GLUCOSE SERPL-MCNC: 234 MG/DL
HCT VFR BLD CALC: 46.4 %
HGB BLD-MCNC: 15.3 G/DL
IMM GRANULOCYTES NFR BLD AUTO: 0.2 %
LYMPHOCYTES # BLD AUTO: 1.71 K/UL
LYMPHOCYTES NFR BLD AUTO: 31.2 %
MAN DIFF?: NORMAL
MCHC RBC-ENTMCNC: 29.3 PG
MCHC RBC-ENTMCNC: 33 G/DL
MCV RBC AUTO: 88.7 FL
MONOCYTES # BLD AUTO: 0.51 K/UL
MONOCYTES NFR BLD AUTO: 9.3 %
NEUTROPHILS # BLD AUTO: 3 K/UL
NEUTROPHILS NFR BLD AUTO: 54.7 %
PLATELET # BLD AUTO: 225 K/UL
POTASSIUM SERPL-SCNC: 5.3 MMOL/L
PROT SERPL-MCNC: 7.4 G/DL
RBC # BLD: 5.23 M/UL
RBC # FLD: 13.4 %
SODIUM SERPL-SCNC: 136 MMOL/L
WBC # FLD AUTO: 5.48 K/UL

## 2024-12-19 LAB
ALBUMIN MFR SERPL ELPH: 55.7 %
ALBUMIN SERPL-MCNC: 4.1 G/DL
ALBUMIN/GLOB SERPL: 1.2 RATIO
ALPHA1 GLOB MFR SERPL ELPH: 4.3 %
ALPHA1 GLOB SERPL ELPH-MCNC: 0.3 G/DL
ALPHA2 GLOB MFR SERPL ELPH: 13.9 %
ALPHA2 GLOB SERPL ELPH-MCNC: 1 G/DL
B-GLOBULIN MFR SERPL ELPH: 11.8 %
B-GLOBULIN SERPL ELPH-MCNC: 0.9 G/DL
DEPRECATED KAPPA LC FREE/LAMBDA SER: 1.32 RATIO
GAMMA GLOB FLD ELPH-MCNC: 1.1 G/DL
GAMMA GLOB MFR SERPL ELPH: 14.3 %
IGA 24H UR QL IFE: NORMAL
IGA SER QL IEP: 321 MG/DL
IGG SER QL IEP: 1070 MG/DL
IGM SER QL IEP: 138 MG/DL
INTERPRETATION SERPL IEP-IMP: NORMAL
KAPPA LC CSF-MCNC: 2.95 MG/DL
KAPPA LC SERPL-MCNC: 3.88 MG/DL
M PROTEIN SPEC IFE-MCNC: NORMAL
PROT SERPL-MCNC: 7.4 G/DL
PROT SERPL-MCNC: 7.4 G/DL

## 2025-05-15 ENCOUNTER — INPATIENT (INPATIENT)
Facility: HOSPITAL | Age: 80
LOS: 4 days | Discharge: HOME | DRG: 66 | End: 2025-05-20
Attending: STUDENT IN AN ORGANIZED HEALTH CARE EDUCATION/TRAINING PROGRAM | Admitting: STUDENT IN AN ORGANIZED HEALTH CARE EDUCATION/TRAINING PROGRAM
Payer: MEDICARE

## 2025-05-15 VITALS
WEIGHT: 190.04 LBS | DIASTOLIC BLOOD PRESSURE: 89 MMHG | OXYGEN SATURATION: 94 % | RESPIRATION RATE: 18 BRPM | HEART RATE: 76 BPM | TEMPERATURE: 98 F | SYSTOLIC BLOOD PRESSURE: 143 MMHG

## 2025-05-15 DIAGNOSIS — E78.5 HYPERLIPIDEMIA, UNSPECIFIED: ICD-10-CM

## 2025-05-15 DIAGNOSIS — F32.9 MAJOR DEPRESSIVE DISORDER, SINGLE EPISODE, UNSPECIFIED: ICD-10-CM

## 2025-05-15 DIAGNOSIS — Z98.890 OTHER SPECIFIED POSTPROCEDURAL STATES: Chronic | ICD-10-CM

## 2025-05-15 DIAGNOSIS — E11.9 TYPE 2 DIABETES MELLITUS WITHOUT COMPLICATIONS: ICD-10-CM

## 2025-05-15 DIAGNOSIS — R32 UNSPECIFIED URINARY INCONTINENCE: ICD-10-CM

## 2025-05-15 DIAGNOSIS — Z95.1 PRESENCE OF AORTOCORONARY BYPASS GRAFT: Chronic | ICD-10-CM

## 2025-05-15 DIAGNOSIS — I10 ESSENTIAL (PRIMARY) HYPERTENSION: ICD-10-CM

## 2025-05-15 DIAGNOSIS — Z29.9 ENCOUNTER FOR PROPHYLACTIC MEASURES, UNSPECIFIED: ICD-10-CM

## 2025-05-15 DIAGNOSIS — Z95.5 PRESENCE OF CORONARY ANGIOPLASTY IMPLANT AND GRAFT: Chronic | ICD-10-CM

## 2025-05-15 DIAGNOSIS — I48.91 UNSPECIFIED ATRIAL FIBRILLATION: ICD-10-CM

## 2025-05-15 LAB
ALBUMIN SERPL ELPH-MCNC: 3.1 G/DL — LOW (ref 3.5–5)
ALP SERPL-CCNC: 53 U/L — SIGNIFICANT CHANGE UP (ref 40–120)
ALT FLD-CCNC: 11 U/L DA — SIGNIFICANT CHANGE UP (ref 10–60)
ANION GAP SERPL CALC-SCNC: 6 MMOL/L — SIGNIFICANT CHANGE UP (ref 5–17)
APTT BLD: 31.7 SEC — SIGNIFICANT CHANGE UP (ref 26.1–36.8)
AST SERPL-CCNC: 14 U/L — SIGNIFICANT CHANGE UP (ref 10–40)
BASOPHILS # BLD AUTO: 0.03 K/UL — SIGNIFICANT CHANGE UP (ref 0–0.2)
BASOPHILS NFR BLD AUTO: 0.5 % — SIGNIFICANT CHANGE UP (ref 0–2)
BILIRUB SERPL-MCNC: 0.7 MG/DL — SIGNIFICANT CHANGE UP (ref 0.2–1.2)
BUN SERPL-MCNC: 16 MG/DL — SIGNIFICANT CHANGE UP (ref 7–18)
CALCIUM SERPL-MCNC: 9.4 MG/DL — SIGNIFICANT CHANGE UP (ref 8.4–10.5)
CHLORIDE SERPL-SCNC: 102 MMOL/L — SIGNIFICANT CHANGE UP (ref 96–108)
CO2 SERPL-SCNC: 26 MMOL/L — SIGNIFICANT CHANGE UP (ref 22–31)
CREAT SERPL-MCNC: 1.27 MG/DL — SIGNIFICANT CHANGE UP (ref 0.5–1.3)
EGFR: 58 ML/MIN/1.73M2 — LOW
EGFR: 58 ML/MIN/1.73M2 — LOW
EOSINOPHIL # BLD AUTO: 0.15 K/UL — SIGNIFICANT CHANGE UP (ref 0–0.5)
EOSINOPHIL NFR BLD AUTO: 2.3 % — SIGNIFICANT CHANGE UP (ref 0–6)
GLUCOSE BLDC GLUCOMTR-MCNC: 172 MG/DL — HIGH (ref 70–99)
GLUCOSE SERPL-MCNC: 149 MG/DL — HIGH (ref 70–99)
HCT VFR BLD CALC: 45.5 % — SIGNIFICANT CHANGE UP (ref 39–50)
HGB BLD-MCNC: 15.4 G/DL — SIGNIFICANT CHANGE UP (ref 13–17)
IMM GRANULOCYTES NFR BLD AUTO: 0.2 % — SIGNIFICANT CHANGE UP (ref 0–0.9)
INR BLD: 1.07 RATIO — SIGNIFICANT CHANGE UP (ref 0.85–1.16)
LYMPHOCYTES # BLD AUTO: 1.14 K/UL — SIGNIFICANT CHANGE UP (ref 1–3.3)
LYMPHOCYTES # BLD AUTO: 17.1 % — SIGNIFICANT CHANGE UP (ref 13–44)
MCHC RBC-ENTMCNC: 29.1 PG — SIGNIFICANT CHANGE UP (ref 27–34)
MCHC RBC-ENTMCNC: 33.8 G/DL — SIGNIFICANT CHANGE UP (ref 32–36)
MCV RBC AUTO: 86 FL — SIGNIFICANT CHANGE UP (ref 80–100)
MONOCYTES # BLD AUTO: 0.65 K/UL — SIGNIFICANT CHANGE UP (ref 0–0.9)
MONOCYTES NFR BLD AUTO: 9.8 % — SIGNIFICANT CHANGE UP (ref 2–14)
NEUTROPHILS # BLD AUTO: 4.68 K/UL — SIGNIFICANT CHANGE UP (ref 1.8–7.4)
NEUTROPHILS NFR BLD AUTO: 70.1 % — SIGNIFICANT CHANGE UP (ref 43–77)
NRBC BLD AUTO-RTO: 0 /100 WBCS — SIGNIFICANT CHANGE UP (ref 0–0)
PLATELET # BLD AUTO: 182 K/UL — SIGNIFICANT CHANGE UP (ref 150–400)
POTASSIUM SERPL-MCNC: 4.1 MMOL/L — SIGNIFICANT CHANGE UP (ref 3.5–5.3)
POTASSIUM SERPL-SCNC: 4.1 MMOL/L — SIGNIFICANT CHANGE UP (ref 3.5–5.3)
PROT SERPL-MCNC: 7.2 G/DL — SIGNIFICANT CHANGE UP (ref 6–8.3)
PROTHROM AB SERPL-ACNC: 12.4 SEC — SIGNIFICANT CHANGE UP (ref 9.9–13.4)
RBC # BLD: 5.29 M/UL — SIGNIFICANT CHANGE UP (ref 4.2–5.8)
RBC # FLD: 13.2 % — SIGNIFICANT CHANGE UP (ref 10.3–14.5)
SODIUM SERPL-SCNC: 134 MMOL/L — LOW (ref 135–145)
TROPONIN I, HIGH SENSITIVITY RESULT: 1008 NG/L — HIGH
TROPONIN I, HIGH SENSITIVITY RESULT: 459.8 NG/L — HIGH
WBC # BLD: 6.66 K/UL — SIGNIFICANT CHANGE UP (ref 3.8–10.5)
WBC # FLD AUTO: 6.66 K/UL — SIGNIFICANT CHANGE UP (ref 3.8–10.5)

## 2025-05-15 PROCEDURE — 70496 CT ANGIOGRAPHY HEAD: CPT | Mod: 26

## 2025-05-15 PROCEDURE — 93010 ELECTROCARDIOGRAM REPORT: CPT

## 2025-05-15 PROCEDURE — 99223 1ST HOSP IP/OBS HIGH 75: CPT | Mod: GC

## 2025-05-15 PROCEDURE — 70498 CT ANGIOGRAPHY NECK: CPT | Mod: 26

## 2025-05-15 PROCEDURE — 70450 CT HEAD/BRAIN W/O DYE: CPT | Mod: 26,XU

## 2025-05-15 PROCEDURE — 0042T: CPT

## 2025-05-15 PROCEDURE — 71045 X-RAY EXAM CHEST 1 VIEW: CPT | Mod: 26

## 2025-05-15 PROCEDURE — 99291 CRITICAL CARE FIRST HOUR: CPT

## 2025-05-15 RX ORDER — ATORVASTATIN CALCIUM 80 MG/1
80 TABLET, FILM COATED ORAL AT BEDTIME
Refills: 0 | Status: DISCONTINUED | OUTPATIENT
Start: 2025-05-15 | End: 2025-05-20

## 2025-05-15 RX ORDER — ASPIRIN 325 MG
325 TABLET ORAL ONCE
Refills: 0 | Status: COMPLETED | OUTPATIENT
Start: 2025-05-15 | End: 2025-05-15

## 2025-05-15 RX ORDER — ESCITALOPRAM OXALATE 20 MG/1
5 TABLET ORAL DAILY
Refills: 0 | Status: DISCONTINUED | OUTPATIENT
Start: 2025-05-15 | End: 2025-05-20

## 2025-05-15 RX ORDER — DEXTROSE 50 % IN WATER 50 %
25 SYRINGE (ML) INTRAVENOUS ONCE
Refills: 0 | Status: DISCONTINUED | OUTPATIENT
Start: 2025-05-15 | End: 2025-05-19

## 2025-05-15 RX ORDER — DONEPEZIL HYDROCHLORIDE 5 MG/1
5 TABLET ORAL AT BEDTIME
Refills: 0 | Status: DISCONTINUED | OUTPATIENT
Start: 2025-05-15 | End: 2025-05-19

## 2025-05-15 RX ORDER — OXYBUTYNIN CHLORIDE 5 MG/1
10 TABLET, FILM COATED, EXTENDED RELEASE ORAL AT BEDTIME
Refills: 0 | Status: DISCONTINUED | OUTPATIENT
Start: 2025-05-15 | End: 2025-05-20

## 2025-05-15 RX ORDER — ENOXAPARIN SODIUM 100 MG/ML
85 INJECTION SUBCUTANEOUS EVERY 12 HOURS
Refills: 0 | Status: DISCONTINUED | OUTPATIENT
Start: 2025-05-15 | End: 2025-05-16

## 2025-05-15 RX ORDER — INSULIN LISPRO 100 U/ML
INJECTION, SOLUTION INTRAVENOUS; SUBCUTANEOUS
Refills: 0 | Status: DISCONTINUED | OUTPATIENT
Start: 2025-05-15 | End: 2025-05-20

## 2025-05-15 RX ORDER — OXYBUTYNIN CHLORIDE 5 MG/1
10 TABLET, FILM COATED, EXTENDED RELEASE ORAL AT BEDTIME
Refills: 0 | Status: DISCONTINUED | OUTPATIENT
Start: 2025-05-15 | End: 2025-05-15

## 2025-05-15 RX ORDER — ASPIRIN 325 MG
81 TABLET ORAL DAILY
Refills: 0 | Status: DISCONTINUED | OUTPATIENT
Start: 2025-05-15 | End: 2025-05-20

## 2025-05-15 RX ORDER — INSULIN LISPRO 100 U/ML
INJECTION, SOLUTION INTRAVENOUS; SUBCUTANEOUS AT BEDTIME
Refills: 0 | Status: DISCONTINUED | OUTPATIENT
Start: 2025-05-15 | End: 2025-05-20

## 2025-05-15 RX ORDER — METOPROLOL SUCCINATE 50 MG/1
12.5 TABLET, EXTENDED RELEASE ORAL
Refills: 0 | Status: DISCONTINUED | OUTPATIENT
Start: 2025-05-15 | End: 2025-05-16

## 2025-05-15 RX ADMIN — Medication 325 MILLIGRAM(S): at 19:38

## 2025-05-15 RX ADMIN — Medication 1000 MILLILITER(S): at 17:51

## 2025-05-15 NOTE — PATIENT PROFILE ADULT - FUNCTIONAL ASSESSMENT - DAILY ACTIVITY 4.
"Medical screening examination initiated.  I have conducted a focused provider triage encounter, findings are as follows:    Brief history of present illness:  arrived to ED due to abdominal pain and n/v/d. Hx of crohn's disease. Does not take medication for crohn's.     Vitals:    12/15/24 1228   BP: 118/80   BP Location: Left arm   Pulse: (!) 112   Resp: 14   Temp: 98.7 °F (37.1 °C)   TempSrc: Oral   SpO2: 95%   Weight: 67.1 kg (148 lb)   Height: 5' 7" (1.702 m)       Pertinent physical exam:  awake, alert, has non-labored breathing, ambulatory into triage.     Brief workup plan:  labs     Preliminary workup initiated; this workup will be continued and followed by the physician or advanced practice provider that is assigned to the patient when roomed.  "
3 = A little assistance

## 2025-05-15 NOTE — ED PROVIDER NOTE - CLINICAL SUMMARY MEDICAL DECISION MAKING FREE TEXT BOX
Detail Level: Detailed 80-year-old male here for assessment of stroke.  With new onset of facial droop and slurred speech.  NIHSS is 2.  Will follow-up on imaging.    Spoke with on-call stroke neurologist Dr. Williamson discussed that given nondisabling deficit of NIHSS with facial droop and dysarthria patient is not a candidate for TNK.  Recommended admission and further evaluation with MRI.  Discussed plan of care with family who is comfortable with patient being admitted to the hospital.  Understands risks and benefits of TNK and are in agreement with the plan

## 2025-05-15 NOTE — H&P ADULT - PROBLEM SELECTOR PLAN 5
Hx of DM on glipizide 10 BID, pioglitazone 15 QD, jardiance 10mg 3 days a week      - Hold home med   - SSI, FS  - Diabetic diet  - f/u A1c

## 2025-05-15 NOTE — H&P ADULT - ASSESSMENT
80yM with PMH CAD s/p 6 stents, HTN HLD DM Dementia admitted to telemetry for stroke and new onset AFib. 80yM from home, ambulates independently with PMH CAD s/p 6 stents, HTN HLD DM Dementia (AAOx2 baseline) urinary incontinence and depression presents with new onset L sided partial facial droop and slurred speech. Admitted to telemetry for stroke and new onset AFib.

## 2025-05-15 NOTE — H&P ADULT - NSICDXPASTMEDICALHX_GEN_ALL_CORE_FT
PAST MEDICAL HISTORY:  CAD (coronary artery disease)     Dementia     DM (diabetes mellitus)     HLD (hyperlipidemia)     HTN (hypertension)     Major depression     Urinary incontinence

## 2025-05-15 NOTE — ED ADULT TRIAGE NOTE - CHIEF COMPLAINT QUOTE
biba as notification for stroke symptoms with left facial droop and slurred speech started an hour ago,last known well an hour ago as per ems

## 2025-05-15 NOTE — PATIENT PROFILE ADULT - FUNCTIONAL ASSESSMENT - BASIC MOBILITY 6.
[None] : No associated symptoms are reported. [de-identified] : Ms. Paris is a 64 yo female who is being referred by Dr. DOLLY Cormier for a recent ct scan finding. Sore throat for a couple of months went UC and was not strep,told ok until she saw Dr. Cormier who ordered Omeprazole and famotidine qhs and  nasal sprays which has helped. A ct scan was also ordered. \par Denies pain, dysphagia, dysphonia and or dyspnea. \par 6/13/2023 CT STN scan showing  2.1cm hypodense lesion at the midline of base of tonsillar with internal hypodense regions rims calcification. \par Denies cardiac issues or lung nodules. On asa 81 mg daily. \par Non smoke, never. Social alcohol intake. no associated pain.   2 = A lot of assistance

## 2025-05-15 NOTE — ED ADULT TRIAGE NOTE - SOURCE OF INFORMATION
[General Appearance - Alert] : alert [General Appearance - In No Acute Distress] : in no acute distress [Neck Appearance] : the appearance of the neck was normal [Auscultation Breath Sounds / Voice Sounds] : lungs were clear to auscultation bilaterally [Heart Rate And Rhythm] : heart rate was normal and rhythm regular [Heart Sounds] : normal S1 and S2 [Musculoskeletal - Swelling] : no joint swelling seen [Motor Tone] : muscle strength and tone were normal [] : no rash [No Focal Deficits] : no focal deficits [Oriented To Time, Place, And Person] : oriented to person, place, and time Patient/EMS

## 2025-05-15 NOTE — ED ADULT NURSE NOTE - NSFALLLASTSIX_ED_ALL_ED
----- Message from Christelle Power sent at 3/27/2017  4:00 PM CDT -----  Contact: Sylvie  x _1st Request  _  2nd Request  _  3rd Request    Who:Sylvie Michaels billing    Why:Sylvie states the Steralization form needs the Doctor signature on it the one that is filed ion the patient charts has not been signed     What Number to Call Back:ext [phone]    When to Expect a call back: (Before the end of the day)   -- if call after 3:00 call back will be tomorrow.       Chrissie Mercer a message requesting a call back at ext [phone] or via staff msg for Dr. Mosquera's staff.   No. 3 = A little assistance

## 2025-05-15 NOTE — H&P ADULT - PROBLEM SELECTOR PLAN 2
p/w new onset dysarthria and L partial facial droop  EKG: Afib 100BPM  CHADSVASC: 6  Cardio Dr. Harrison consulted   - Started on FD lovenox  - Started on Metoprolol 12.5 BID for rate control  - Tele monitoring (Goal rate <110)  - f/u TTE p/w new onset dysarthria and L partial facial droop  EKG: Afib 100BPM  CHADSVASC: 6  Cardio Dr. Harrison consulted   - Started on FD lovenox  - Started on Metoprolol 12.5 BID for rate control  - Tele monitoring (Goal rate <110)  - f/u TTE  - fu repeat trop (first trop 459)

## 2025-05-15 NOTE — PATIENT PROFILE ADULT - FALL HARM RISK - FACTORS NURSING JUDGEMENT
Caller: Gloria Herndon        Medication Name and Dosage: flecainide (TAMBOCOR) 100 MG Tab        Please call your pharmacy and have them send us a refill request or speak to a live representative, RX number may have changed.     Medication amount left: 5 days     Preferred Pharmacy: [facility] in [place]     Other questions (Topic): requested 10 day suply     Callback Number (Will only call for issues): [phone]     Thank you     -Gerry SERRANO   Yes

## 2025-05-15 NOTE — H&P ADULT - ATTENDING COMMENTS
Instructions: This plan will send the code FBSE to the PM system.  DO NOT or CHANGE the price. Price (Do Not Change): 0.00 Detail Level: Generalized INCOMPLETE NOTE    Patient seen and examined with PGY1 CLIFF Moss at bedside; Discussed with PGY1 and PGY3    80yM with PMH CAD s/p 6 stents, s/p CABG 20 yrs ago on ASA could not tolerate Plavix,  HTN HLD DM Dementia presenting with left facial droop brought in by family after returning from Hospital home took a shower and wife noticed facial asymmetry     s/p recent Coronary Angio at [facility] yesterday without obstructive disease planned for a Colposcopy today for unclear reasons ??IBD eval; H/H stable; no bleeding reported; not eaten anything since yesterday    Vital Signs Last 24 Hrs  T(C): 36.7 (15 May 2025 19:35), Max: 36.8 (15 May 2025 17:08)  T(F): 98 (15 May 2025 19:35), Max: 98.3 (15 May 2025 18:08)  HR: 83 (15 May 2025 19:35) (76 - 113)  BP: 124/82 (15 May 2025 19:35) (117/84 - 143/89)  RR: 20 (15 May 2025 19:35) (16 - 20)  SpO2: 95% (15 May 2025 19:35) (94% - 96%): room air    P/E:  Psych: AAO x2  Neuro: B/L UE 5/5; LLE ?4-5/5; RLE 5/5; left facila droop AND SLURRED SPEECH  CVS: S1S2 present, regular, no edema  Resp: BLAE+, No wheeze or Rhonchi  GI: Soft, BS+, Non tender, non distended  Extr: No  calf tenderness B/L Lower extremities  Skin: Warm and moist without any rashes    Labs:                        15.4   6.66  )-----------( 182      ( 15 May 2025 16:15 )             45.5     05-15    134[L]  |  102  |  16  ----------------------------<  149[H]  4.1   |  26  |  1.27    Ca    9.4      15 May 2025 16:15    TPro  7.2  /  Alb  3.1[L]  /  TBili  0.7  /  DBili  x   /  AST  14  /  ALT  11  /  AlkPhos  53  05-15    EKG: A.Fib; no acute ST, T changes  Tele monitor: HR varying from 90s to 120s  CXR:pending    < from: CT Brain Stroke Protocol (05.15.25 @ 16:19)     IMPRESSION:  1. The CT study of the brain demonstrates multiple old infarcts along with generalized small vessel ischemic changes. There infarcts in both MCA territories more extensive on the right.2. The perfusion study suggests an area of penumbra surrounding the right frontal MCA infarct, raising the possibility of new ischemia. Also there are areas of hypoperfusion in the left temporal lobe as well as bilateral   frontal white matter.  3. In the neck, there is narrowing of both carotid bifurcations but calcified plaque, more extensive on the left. Approximately 50-60% narrowing suggested in the proximal left internal carotid. Approximately 30% narrowing suggested of the right internal carotid. The left vertebral is dominant with a hypoplastic right vertebral. However there is extensive calcified plaque at the left vertebral origin, as well as in the left V4 segment.4. The intracerebral vasculature appears to be patent, as outlined above.    Follow-up MR imaging ofthe brain is recommended for further assessment.    A/P:  Acute CVA suspect embolic in the setting of A. Fib  New onset A. Fib suspect ppt by volume depletion s/p bowel prep  Tachycardia due to intravascular volume depletion  New onset LV dysfunction/ Systolic HF  Hx Type 2 DM/ HTN  Mild dementia    Plan:  Admit to Tele; 2D Echo  Patient s/p Code stroke in ED; not a candidate for tpA as per Stroke team as per ED physician  Continue Ecotrin and high dose Statin Lipitor 80 mg HS  Patient will likely need MRI/ MRA Brain  Given new LV dysfunction; will keep on gentle hydration with NS at 60cc/hr x 24 hrs;   Holding anti HTN for permissive HTN but will give low dose Metoprolol 12.5 mg q 12 hrs if HR persistently above 120  Lovenox 1mg/kg q 12 hrs for now; Check eligibility for DOCs; once coverage established will transition to Apixaban 5mg BID   Cardio Consult Dr. Chan  Neuro consult Dr. Langley;     PaSSED dYSPHAGIA: sOFT AND BITE SIZED DIET; OFFICIAL SPEECH AND SWALLOW    INCPMPLETE NOTE Patient seen and examined with PGY1 CLIFF Moss at bedside; Discussed with PGY1 and PGY3    80yM with PMH CAD s/p 6 stents, s/p CABG 20 yrs ago on ASA could not tolerate Plavix,  HTN HLD DM Dementia presenting with left facial droop brought in by family after returning from Hospital home took a shower and wife noticed facial asymmetry     s/p recent Coronary Angio at [facility] yesterday without obstructive disease planned for a Colonoscopy  for unclear reasons ??IBD eval but patient refused nd patient sent home today; H/H stable; no bleeding reported; not eaten anything since yesterday    Vital Signs Last 24 Hrs  T(C): 36.7 (15 May 2025 19:35), Max: 36.8 (15 May 2025 17:08)  T(F): 98 (15 May 2025 19:35), Max: 98.3 (15 May 2025 18:08)  HR: 83 (15 May 2025 19:35) (76 - 113)  BP: 124/82 (15 May 2025 19:35) (117/84 - 143/89)  RR: 20 (15 May 2025 19:35) (16 - 20)  SpO2: 95% (15 May 2025 19:35) (94% - 96%): room air    P/E:  Psych: AAO x2  Neuro: B/L UE 5/5; LLE ?4-5/5; RLE 5/5; left facial droop and slurred speech  CVS: S1S2 present, regular, no edema  Resp: BLAE+, No wheeze or Rhonchi  GI: Soft, BS+, Non tender, non distended  Extr: No  calf tenderness B/L Lower extremities  Skin: Warm and moist without any rashes    Labs:                        15.4   6.66  )-----------( 182      ( 15 May 2025 16:15 )             45.5     05-15  134[L]  |  102  |  16  ----------------------------<  149[H]  4.1   |  26  |  1.27    Ca    9.4      15 May 2025 16:15    TPro  7.2  /  Alb  3.1[L]  /  TBili  0.7  /  DBili  x   /  AST  14  /  ALT  11  /  AlkPhos  53  05-15    EKG: A.Fib; no acute ST, T changes  Tele monitor: HR varying from 90s to 120s  CXR:pending (Ordered)     CT Brain Stroke Protocol (05.15.25 @ 16:19)     IMPRESSION:  1. The CT study of the brain demonstrates multiple old infarcts along with generalized small vessel ischemic changes. There infarcts in both MCA territories more extensive on the right.2. The perfusion study suggests an area of penumbra surrounding the right frontal MCA infarct, raising the possibility of new ischemia. Also there are areas of hypoperfusion in the left temporal lobe as well as bilateral   frontal white matter.  3. In the neck, there is narrowing of both carotid bifurcations but calcified plaque, more extensive on the left. Approximately 50-60% narrowing suggested in the proximal left internal carotid. Approximately 30% narrowing suggested of the right internal carotid. The left vertebral is dominant with a hypoplastic right vertebral. However there is extensive calcified plaque at the left vertebral origin, as well as in the left V4 segment.4. The intracerebral vasculature appears to be patent, as outlined above.  Follow-up MR imaging of the brain is recommended for further assessment.    A/P:  Acute CVA suspect embolic in the setting of A. Fib  New onset A. Fib suspect ppt by volume depletion s/p bowel prep  Tachycardia due to intravascular volume depletion  New onset LV dysfunction/ Systolic HF (undiagnosed HF)  Elevated Troponin demand ischemia vs HTN   Hx Type 2 DM/ HTN  Mild dementia  Hx Major depression/ Urinary incontinence    Plan:  Admit to Tele; 2D Echo  Patient s/p Code stroke in ED; not a candidate for tpA as per Stroke team as reported by  ED physician  Continue Ecotrin and high dose Statin Lipitor 80 mg HS (home dose)  Patient will likely need MRI/ MRA Brain; Neuro consult Dr. Langley;   Given new LV dysfunction; will keep on gentle hydration with NS at 60cc/hr x 24 hrs;   Holding anti HTN for permissive HTN but will give low dose Metoprolol 12.5 mg q 12 hrs if HR persistently above 120  Lovenox 1mg/kg q 12 hrs for now; Check eligibility for DOCs; once coverage established will transition to Apixaban 5mg BID   Cardio Consult Dr. Harrison  A1C ADD ON: ipid panel AM  Hold oral hypoglycemics; Accu-Chek with Insulin scale  Patient passed dysphagia screen in ED; will give soft and bite sized food; official Speech and swallow eval  PT eval AM; At baseline was independent  Rest as per PGY1 MAR note above; d/w Dr. Moss and PGY3 DR. liu

## 2025-05-15 NOTE — PATIENT PROFILE ADULT - FALL HARM RISK - HARM RISK INTERVENTIONS

## 2025-05-15 NOTE — PHARMACOTHERAPY INTERVENTION NOTE - COMMENTS
Prescriptions filled at [facility] #[id number], [place] ([phone]) for Ranjit Lewis 1945:    Glipizide 10 mg PO daily  Escitalopram 5 mg PO daily  Atorvastatin 80 mg tab PO daily picked up on  (previously on rosuvastatin 20 mg PO daily picked up on )  Ramipril 2.5 mg (cap) PO daily  Fenofibrate 160 mg PO daily  Pioglitazone 45 mg PO daily  Pantoprazole 40 mg PO daily  Vit D2 50,000 units PO once a week  Jardiance 10 mg 1 tablet twice a week in the morning  Recently picked up Radha CHANG bowel prep    Previous prescriptions no longer refilling:  Donepezil 5 mg not refilled since December (90 days supply)  Oxybuynin ER 10 mg not picked up since 2024  Metformin not picked up since 2024  Done with patient's family and prescriptions filled at [facility] #[id number], [place] ([phone]):    Glipizide 10 mg PO 2 times a day  Escitalopram 5 mg PO daily (in AM)  Atorvastatin 80 mg tab PO daily  Ramipril 2.5 mg (cap) PO daily  Fenofibrate 160 mg PO daily  Pioglitazone 45 mg PO daily  Pantoprazole 40 mg PO daily  Vit D 50,000 units PO once a week on Friday  Jardiance 10 mg 1 tablet three times a week in the morning before breakfast; has been on hold for about 1 week for diarrhea  Donepezil 5 mg PO daily  Oxybuynin ER 10 mg PO daily  Vitamin B12 2500 mcg PO daily in the morning  Omega-3 500 mg PO daily  Ecotrin 81 mg PO daily  Recently picked up Radha CHANG bowel prep

## 2025-05-15 NOTE — ED PROVIDER NOTE - OBJECTIVE STATEMENT
80-year-old male with history of HTN, HLD, DM, dementia presenting from home for evaluation of stroke.  Patient last normal 2:30 PM today.  Came out of shower with new onset of left facial droop and slurred speech.  Patient is both English and Uzbek speaking.  Accompanied by EMS who states that patient was recently discharged from [facility].  Unknown blood thinner use.  History limited patient is stroke notification. Pt seen on arrival.

## 2025-05-15 NOTE — H&P ADULT - PROBLEM SELECTOR PLAN 1
p/w slurred speech and L sided partial facial droop w/ last known normal at: 2:30PM 5/15  code stroke in ED with NIHSS 2  Reexamination NIHSS 5  ASA qd, high intensity statin qhs  Allergy to plavix  CT head, stroke protocol shows  Tele monitoring  F/U Echo with bubble study  Monitor BP - allow permissive htn x24hr from last known normal  PT consult  Dysphagia screen: passed at bedside  Neuro checks q4  Neuro Consulted: Dr Langley

## 2025-05-15 NOTE — H&P ADULT - NSHPPHYSICALEXAM_GEN_ALL_CORE
GENERAL: NAD, sitting upright in the bed  HEAD:  Atraumatic, Normocephalic	  EYES: E, conjunctiva and sclera clear  ENMT: ; Moist mucous membranes,   NECK: Supple, normal appearance,    CHEST/LUNG: Lungs clear to auscultation bilaterally, No rales, rhonchi, wheezing   HEART: Irregular rate and rhythm; No murmurs, rubs, or gallops  ABDOMEN: Soft, Nontender to superficial and deep palpation, Nondistended; Bowel sounds auscultated  BACK: No CVAT, no spinal or paraspinal tenderness  EXTREMITIES:  2+ Peripheral Pulses, No clubbing, cyanosis, or edema  NERVOUS SYSTEM:  Alert & Oriented X2, bilateral UE strength 5/5, LLE strength 4/5 RLE strength 5/5; sensation intact, NIHSS 5; dysarthria present  SKIN: No rashes or lesions. GENERAL: NAD, sitting upright in the bed  HEAD:  Atraumatic, Normocephalic	  EYES: E, conjunctiva and sclera clear  ENMT: ; Moist mucous membranes,   NECK: Supple, normal appearance,    CHEST/LUNG: Lungs clear to auscultation bilaterally, No rales, rhonchi, wheezing   HEART: Irregular rate and rhythm; No murmurs, rubs, or gallops  ABDOMEN: Soft, Nontender to superficial and deep palpation, Nondistended; Bowel sounds auscultated  BACK: No CVAT, no spinal or paraspinal tenderness  EXTREMITIES:  2+ Peripheral Pulses, No clubbing, cyanosis, or edema  NERVOUS SYSTEM:  Alert & Oriented X2, bilateral UE strength 5/5, LLE strength 4/5 RLE strength 5/5; sensation intact, NIHSS 5; dysarthria present, L sided partial facial droop  SKIN: No rashes or lesions. GENERAL: NAD  HEAD:  Atraumatic, Normocephalic	  EYES: E, conjunctiva and sclera clear  ENMT: ; Moist mucous membranes,   NECK: Supple, normal appearance,    CHEST/LUNG: Lungs clear to auscultation bilaterally, No rales, rhonchi, wheezing   HEART: Irregular rate and rhythm; No murmurs, rubs, or gallops  ABDOMEN: Soft, Nontender to superficial and deep palpation, Nondistended; Bowel sounds auscultated  BACK: No CVAT, no spinal or paraspinal tenderness  EXTREMITIES:  2+ Peripheral Pulses, No clubbing, cyanosis, or edema  NERVOUS SYSTEM:  Alert & Oriented X2, bilateral UE strength 5/5, LLE strength 4/5 RLE strength 5/5; sensation intact, NIHSS 5; dysarthria present, L sided partial facial droop  SKIN: No rashes or lesions.

## 2025-05-15 NOTE — H&P ADULT - HISTORY OF PRESENT ILLNESS
80yM with PMH CAD s/p 6 stents, HTN HLD DM Dementia,  80yM from home, ambulates independently with PMH CAD s/p 6 stents, HTN HLD DM Dementia (AAOx2 baseline) urinary incontinence and depression presents with new onset L sided partial facial droop and slurred speech. Pt endorsed SOB on exertion 3 days ago to his cardiologist, recommended a TTE which apparently showed EF of 38%. Was admitted to [facility] 2 days ago for a colonoscopy, family at bedside is unsure of the reason for it. Was NPO and did not complete bowel prep. Was brought home around 2PM, and had an episode of urinary incontinence. At 2:30 after the patient showered, the wife noticed facial droop, slurring of his words, and drooling from the left side of his mouth. On arrival to the ED, code stroke was called, NIHSS by ED attending was 2. On examination by the admitting team, NIHSS was 5. Denies burning urination, chest pain, nausea and vomiting.    In the ED,   v/s: 98F, 76BPM, 143/89 RR18, 94%  Labs: WBC 6.6, Hgb 15.4, Plt 182, Na 134, K 4.1, Cr 1.2  EKG: Afib 100BPM  CXR: pending  CTH: Multiple old infarcts along with generalized small vessel ischemic changes. There infarcts in both MCA territories more extensive on the right.  - The perfusion study suggests an area of penumbra surrounding the right   frontal MCA infarct, raising the possibility of new ischemia. Also there   are areas of hypoperfusion in the left temporal lobe as well as bilateral   frontal white matter.  - Approximately 50-60% narrowing suggested in the proximal left internal carotid. Approximately 30% narrowing suggested of the right internal carotid.  s/p ASA and atorvastatin  s/p bolus NS 1L 80yM from home, ambulates independently with PMH CAD s/p 6 stents, HTN HLD DM Dementia (AAOx2 baseline) urinary incontinence and depression presents with new onset L sided partial facial droop and slurred speech. Pt endorsed SOB on exertion 3 days ago to his cardiologist, recommended a TTE which apparently showed EF of 38%. Was admitted to [facility] 2 days ago for a colonoscopy, family at bedside is unsure of the reason for it. Was NPO and did not complete bowel prep. Was brought home around 2PM, and had an episode of urinary incontinence. At 2:30 after the patient showered, the wife noticed facial droop, slurring of his words, and drooling from the left side of his mouth. On arrival to the ED, code stroke was called, NIHSS by ED attending was 2. On examination by the admitting team, NIHSS was 5. Denies burning urination, chest pain, nausea and vomiting.    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 There infarcts in both MCA territories more extensive on the right.  - The perfusion study suggests an area of penumbra surrounding the right   frontal MCA infarct, raising the possibility of new ischemia. Also there   are areas of hypoperfusion in the left temporal lobe as well as bilateral   frontal white matter.  - Approximately 50-60% narrowing suggested in the proximal left internal carotid. Approximately 30% narrowing suggested of the right internal carotid.  s/p ASA and atorvastatin  s/p bolus NS 1L

## 2025-05-16 ENCOUNTER — RESULT REVIEW (OUTPATIENT)
Age: 80
End: 2025-05-16

## 2025-05-16 LAB
A1C WITH ESTIMATED AVERAGE GLUCOSE RESULT: 8.4 % — HIGH (ref 4–5.6)
ALBUMIN SERPL ELPH-MCNC: 3.1 G/DL — LOW (ref 3.5–5)
ALP SERPL-CCNC: 50 U/L — SIGNIFICANT CHANGE UP (ref 40–120)
ALT FLD-CCNC: 13 U/L DA — SIGNIFICANT CHANGE UP (ref 10–60)
ANION GAP SERPL CALC-SCNC: 12 MMOL/L — SIGNIFICANT CHANGE UP (ref 5–17)
AST SERPL-CCNC: 22 U/L — SIGNIFICANT CHANGE UP (ref 10–40)
BASOPHILS # BLD AUTO: 0.02 K/UL — SIGNIFICANT CHANGE UP (ref 0–0.2)
BASOPHILS NFR BLD AUTO: 0.3 % — SIGNIFICANT CHANGE UP (ref 0–2)
BILIRUB SERPL-MCNC: 0.6 MG/DL — SIGNIFICANT CHANGE UP (ref 0.2–1.2)
BUN SERPL-MCNC: 14 MG/DL — SIGNIFICANT CHANGE UP (ref 7–18)
CALCIUM SERPL-MCNC: 9 MG/DL — SIGNIFICANT CHANGE UP (ref 8.4–10.5)
CHLORIDE SERPL-SCNC: 102 MMOL/L — SIGNIFICANT CHANGE UP (ref 96–108)
CHOLEST SERPL-MCNC: 204 MG/DL — HIGH
CK MB BLD-MCNC: 7.3 % — HIGH (ref 0–3.5)
CK MB CFR SERPL CALC: 16.7 NG/ML — HIGH (ref 0–3.6)
CK SERPL-CCNC: 229 U/L — SIGNIFICANT CHANGE UP (ref 35–232)
CO2 SERPL-SCNC: 19 MMOL/L — LOW (ref 22–31)
CREAT SERPL-MCNC: 1.02 MG/DL — SIGNIFICANT CHANGE UP (ref 0.5–1.3)
EGFR: 75 ML/MIN/1.73M2 — SIGNIFICANT CHANGE UP
EGFR: 75 ML/MIN/1.73M2 — SIGNIFICANT CHANGE UP
EOSINOPHIL # BLD AUTO: 0.13 K/UL — SIGNIFICANT CHANGE UP (ref 0–0.5)
EOSINOPHIL NFR BLD AUTO: 2 % — SIGNIFICANT CHANGE UP (ref 0–6)
ESTIMATED AVERAGE GLUCOSE: 194 MG/DL — HIGH (ref 68–114)
GLUCOSE BLDC GLUCOMTR-MCNC: 130 MG/DL — HIGH (ref 70–99)
GLUCOSE BLDC GLUCOMTR-MCNC: 154 MG/DL — HIGH (ref 70–99)
GLUCOSE BLDC GLUCOMTR-MCNC: 173 MG/DL — HIGH (ref 70–99)
GLUCOSE BLDC GLUCOMTR-MCNC: 175 MG/DL — HIGH (ref 70–99)
GLUCOSE SERPL-MCNC: 176 MG/DL — HIGH (ref 70–99)
HCT VFR BLD CALC: 45.7 % — SIGNIFICANT CHANGE UP (ref 39–50)
HDLC SERPL-MCNC: 33 MG/DL — LOW
HGB BLD-MCNC: 15.8 G/DL — SIGNIFICANT CHANGE UP (ref 13–17)
IMM GRANULOCYTES NFR BLD AUTO: 0.2 % — SIGNIFICANT CHANGE UP (ref 0–0.9)
LDLC SERPL-MCNC: 134 MG/DL — HIGH
LIPID PNL WITH DIRECT LDL SERPL: 134 MG/DL — HIGH
LYMPHOCYTES # BLD AUTO: 1.46 K/UL — SIGNIFICANT CHANGE UP (ref 1–3.3)
LYMPHOCYTES # BLD AUTO: 22.6 % — SIGNIFICANT CHANGE UP (ref 13–44)
MAGNESIUM SERPL-MCNC: 1.6 MG/DL — SIGNIFICANT CHANGE UP (ref 1.6–2.6)
MCHC RBC-ENTMCNC: 29.3 PG — SIGNIFICANT CHANGE UP (ref 27–34)
MCHC RBC-ENTMCNC: 34.6 G/DL — SIGNIFICANT CHANGE UP (ref 32–36)
MCV RBC AUTO: 84.6 FL — SIGNIFICANT CHANGE UP (ref 80–100)
MONOCYTES # BLD AUTO: 0.67 K/UL — SIGNIFICANT CHANGE UP (ref 0–0.9)
MONOCYTES NFR BLD AUTO: 10.4 % — SIGNIFICANT CHANGE UP (ref 2–14)
NEUTROPHILS # BLD AUTO: 4.18 K/UL — SIGNIFICANT CHANGE UP (ref 1.8–7.4)
NEUTROPHILS NFR BLD AUTO: 64.5 % — SIGNIFICANT CHANGE UP (ref 43–77)
NONHDLC SERPL-MCNC: 171 MG/DL — HIGH
NRBC BLD AUTO-RTO: 0 /100 WBCS — SIGNIFICANT CHANGE UP (ref 0–0)
PHOSPHATE SERPL-MCNC: 2.8 MG/DL — SIGNIFICANT CHANGE UP (ref 2.5–4.5)
PLATELET # BLD AUTO: 192 K/UL — SIGNIFICANT CHANGE UP (ref 150–400)
POTASSIUM SERPL-MCNC: 3.9 MMOL/L — SIGNIFICANT CHANGE UP (ref 3.5–5.3)
POTASSIUM SERPL-SCNC: 3.9 MMOL/L — SIGNIFICANT CHANGE UP (ref 3.5–5.3)
PROT SERPL-MCNC: 6.9 G/DL — SIGNIFICANT CHANGE UP (ref 6–8.3)
RBC # BLD: 5.4 M/UL — SIGNIFICANT CHANGE UP (ref 4.2–5.8)
RBC # FLD: 13.2 % — SIGNIFICANT CHANGE UP (ref 10.3–14.5)
SODIUM SERPL-SCNC: 133 MMOL/L — LOW (ref 135–145)
TRIGL SERPL-MCNC: 204 MG/DL — HIGH
TROPONIN I, HIGH SENSITIVITY RESULT: 1974.9 NG/L — HIGH
TROPONIN I, HIGH SENSITIVITY RESULT: 2980.3 NG/L — HIGH
TROPONIN I, HIGH SENSITIVITY RESULT: 3230.9 NG/L — HIGH
TROPONIN I, HIGH SENSITIVITY RESULT: 3328.1 NG/L — HIGH
TROPONIN I, HIGH SENSITIVITY RESULT: 3968.9 NG/L — HIGH
WBC # BLD: 6.47 K/UL — SIGNIFICANT CHANGE UP (ref 3.8–10.5)
WBC # FLD AUTO: 6.47 K/UL — SIGNIFICANT CHANGE UP (ref 3.8–10.5)

## 2025-05-16 PROCEDURE — 99223 1ST HOSP IP/OBS HIGH 75: CPT

## 2025-05-16 PROCEDURE — 70551 MRI BRAIN STEM W/O DYE: CPT | Mod: 26

## 2025-05-16 PROCEDURE — 99233 SBSQ HOSP IP/OBS HIGH 50: CPT | Mod: GC

## 2025-05-16 RX ORDER — APIXABAN 2.5 MG/1
1 TABLET, FILM COATED ORAL
Qty: 60 | Refills: 0
Start: 2025-05-16 | End: 2025-06-14

## 2025-05-16 RX ORDER — METOPROLOL SUCCINATE 50 MG/1
5 TABLET, EXTENDED RELEASE ORAL ONCE
Refills: 0 | Status: DISCONTINUED | OUTPATIENT
Start: 2025-05-16 | End: 2025-05-16

## 2025-05-16 RX ORDER — METOPROLOL SUCCINATE 50 MG/1
12.5 TABLET, EXTENDED RELEASE ORAL DAILY
Refills: 0 | Status: DISCONTINUED | OUTPATIENT
Start: 2025-05-17 | End: 2025-05-18

## 2025-05-16 RX ORDER — DIGOXIN 250 UG/1
500 TABLET ORAL ONCE
Refills: 0 | Status: DISCONTINUED | OUTPATIENT
Start: 2025-05-16 | End: 2025-05-16

## 2025-05-16 RX ORDER — CYANOCOBALAMIN 1000 UG/ML
1 INJECTION INTRAMUSCULAR; SUBCUTANEOUS
Refills: 0 | DISCHARGE

## 2025-05-16 RX ORDER — ATORVASTATIN CALCIUM 80 MG/1
1 TABLET, FILM COATED ORAL
Refills: 0 | DISCHARGE

## 2025-05-16 RX ORDER — RAMIPRIL 2.5 MG/1
1 CAPSULE ORAL
Refills: 0 | DISCHARGE

## 2025-05-16 RX ORDER — EMPAGLIFLOZIN 25 MG/1
1 TABLET, FILM COATED ORAL
Refills: 0 | DISCHARGE

## 2025-05-16 RX ORDER — ASPIRIN 325 MG
1 TABLET ORAL
Refills: 0 | DISCHARGE

## 2025-05-16 RX ORDER — OXYBUTYNIN CHLORIDE 5 MG/1
1 TABLET, FILM COATED, EXTENDED RELEASE ORAL
Refills: 0 | DISCHARGE

## 2025-05-16 RX ORDER — HALOPERIDOL 10 MG/1
1 TABLET ORAL ONCE
Refills: 0 | Status: COMPLETED | OUTPATIENT
Start: 2025-05-16 | End: 2025-05-16

## 2025-05-16 RX ORDER — ESCITALOPRAM OXALATE 20 MG/1
1 TABLET ORAL
Refills: 0 | DISCHARGE

## 2025-05-16 RX ORDER — OMEGA-3-ACID ETHYL ESTERS CAPSULES 1 G/1
1 CAPSULE, LIQUID FILLED ORAL
Refills: 0 | DISCHARGE

## 2025-05-16 RX ORDER — RAMIPRIL 2.5 MG/1
0 CAPSULE ORAL
Refills: 0 | DISCHARGE

## 2025-05-16 RX ORDER — DIGOXIN 250 UG/1
250 TABLET ORAL ONCE
Refills: 0 | Status: COMPLETED | OUTPATIENT
Start: 2025-05-16 | End: 2025-05-16

## 2025-05-16 RX ORDER — HALOPERIDOL 10 MG/1
2 TABLET ORAL ONCE
Refills: 0 | Status: DISCONTINUED | OUTPATIENT
Start: 2025-05-16 | End: 2025-05-17

## 2025-05-16 RX ORDER — PIOGLITAZONE HYDROCHLORIDE 15 MG/1
1 TABLET ORAL
Refills: 0 | DISCHARGE

## 2025-05-16 RX ORDER — LORAZEPAM 4 MG/ML
1 VIAL (ML) INJECTION ONCE
Refills: 0 | Status: DISCONTINUED | OUTPATIENT
Start: 2025-05-16 | End: 2025-05-16

## 2025-05-16 RX ORDER — APIXABAN 2.5 MG/1
5 TABLET, FILM COATED ORAL EVERY 12 HOURS
Refills: 0 | Status: DISCONTINUED | OUTPATIENT
Start: 2025-05-16 | End: 2025-05-20

## 2025-05-16 RX ADMIN — HALOPERIDOL 1 MILLIGRAM(S): 10 TABLET ORAL at 11:10

## 2025-05-16 RX ADMIN — INSULIN LISPRO 1: 100 INJECTION, SOLUTION INTRAVENOUS; SUBCUTANEOUS at 11:56

## 2025-05-16 RX ADMIN — HALOPERIDOL 1 MILLIGRAM(S): 10 TABLET ORAL at 17:31

## 2025-05-16 RX ADMIN — Medication 81 MILLIGRAM(S): at 11:57

## 2025-05-16 RX ADMIN — Medication 1 MILLIGRAM(S): at 15:45

## 2025-05-16 RX ADMIN — ENOXAPARIN SODIUM 85 MILLIGRAM(S): 100 INJECTION SUBCUTANEOUS at 06:51

## 2025-05-16 RX ADMIN — ATORVASTATIN CALCIUM 80 MILLIGRAM(S): 80 TABLET, FILM COATED ORAL at 00:06

## 2025-05-16 RX ADMIN — METOPROLOL SUCCINATE 12.5 MILLIGRAM(S): 50 TABLET, EXTENDED RELEASE ORAL at 06:11

## 2025-05-16 RX ADMIN — ESCITALOPRAM OXALATE 5 MILLIGRAM(S): 20 TABLET ORAL at 14:04

## 2025-05-16 RX ADMIN — Medication 40 MILLIGRAM(S): at 06:11

## 2025-05-16 RX ADMIN — INSULIN LISPRO 1: 100 INJECTION, SOLUTION INTRAVENOUS; SUBCUTANEOUS at 08:22

## 2025-05-16 RX ADMIN — APIXABAN 5 MILLIGRAM(S): 2.5 TABLET, FILM COATED ORAL at 17:47

## 2025-05-16 RX ADMIN — OXYBUTYNIN CHLORIDE 10 MILLIGRAM(S): 5 TABLET, FILM COATED, EXTENDED RELEASE ORAL at 00:06

## 2025-05-16 RX ADMIN — DONEPEZIL HYDROCHLORIDE 5 MILLIGRAM(S): 5 TABLET ORAL at 00:06

## 2025-05-16 NOTE — PHYSICAL THERAPY INITIAL EVALUATION ADULT - LIVES WITH, PROFILE
spouse,basement of private home with 3 stairs to enter. some sort of rail to access. has chailift to get to main level

## 2025-05-16 NOTE — CONSULT NOTE ADULT - ASSESSMENT
Mr. Lewis is a 79 yo RH man from home, ambulates independently with PMH CAD s/p 6 stents, HTN HLD DM Dementia (AAOx2 baseline) urinary incontinence and depression. He is presenting with L hemiparesis and clinically, almost certainly has a new stroke as well as newly found AFib. Patient has evidence of chronic bilateral MCA embolic appearing infarcts as well. MRI not changing management, but may help stratify bleeding risk. Patient's stroke secondary prevention will be anticoagulation, however, also likely needs antiplatelet agents for coronary stents. Will need discussion with cardiology.    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 Does not need strictly permissive hypertension, but would not aggressively or quickly lower BP.    - MRI brain w/o  - SLP eval  - PT eval and treat  - Eventual AC for secondary stroke prevention when medically ready  - Discuss with cardiology regarding AP plan and AP regimen that would be considered with concurrent anticoagulation  - TTE no bubble  - If not yet starting AC, then have DVT ppx, stop chemical DVT ppx when starting AC  - continue high intensity statin, goal LDL < 70  - even though mechanism is most likely cardioembolic, caution with treatment of HTN due to seen carotid disease bilaterally.
80yM from home, ambulates independently with PMH CAD s/p 6 stents, HTN HLD DM Dementia (AAOx2 baseline) urinary incontinence and depression presents with new onset L sided partial facial droop and slurred speech. Admitted to telemetry for stroke and new onset AFib.    # Stroke.   # New onset atrial fibrillation.   # HTN  #HLD  F/u MRI  neuro f/u   - C/w on FD lovenox  - Started on Metoprolol 12.5 BID for rate control  - Tele monitoring (Goal rate <110)  - f/u TTE  -  atorvastatin 80mg  - tele

## 2025-05-16 NOTE — PHYSICAL THERAPY INITIAL EVALUATION ADULT - PATIENT PROFILE REVIEW, REHAB EVAL
including labs and imaging. Trops discussed with MD De Luna who cleared pt. for PT eval as tolerated./yes

## 2025-05-16 NOTE — PHYSICAL THERAPY INITIAL EVALUATION ADULT - SENSORY TESTS
P.t w/difficulty following for visual field testing, but know gross acute changes noted... Pt favors RT side attention but responds to stimuli from LT.

## 2025-05-16 NOTE — PROGRESS NOTE ADULT - ATTENDING COMMENTS
Patient seen and examined with PGY1 CLIFF Moss at bedside; Discussed with PGY1 and PGY3    80yM with PMH CAD s/p 6 stents, s/p CABG 20 yrs ago on ASA could not tolerate Plavix,  HTN HLD DM Dementia presenting with left facial droop brought in by family after returning from Hospital home took a shower and wife noticed facial asymmetry     s/p recent Coronary Angio at [facility] yesterday without obstructive disease planned for a Colonoscopy  for unclear reasons ??IBD eval but patient refused nd patient sent home today; H/H stable; no bleeding reported; not eaten anything since yesterday    Vital Signs Last 24 Hrs  T(C): 36.6 (16 May 2025 11:32), Max: 36.8 (15 May 2025 17:08)  T(F): 97.9 (16 May 2025 11:32), Max: 98.3 (15 May 2025 18:08)  HR: 73 (16 May 2025 14:12) (68 - 122)  BP: 125/73 (16 May 2025 14:12) (109/73 - 143/89)  RR: 18 (16 May 2025 11:32) (16 - 20)  SpO2: 99% (16 May 2025 14:12) (93% - 100%): room air      P/E:  Psych: AAO x2  Neuro: B/L UE 5/5; LLE ?4-5/5; RLE 5/5; left facial droop and slurred speech  CVS: S1S2 present, regular, no edema  Resp: BLAE+, No wheeze or Rhonchi  GI: Soft, BS+, Non tender, non distended  Extr: No  calf tenderness B/L Lower extremities  Skin: Warm and moist without any rashes    Labs:                        15.8   6.47  )-----------( 192      ( 16 May 2025 06:40 )             45.7   05-16    133[L]  |  102  |  14  ----------------------------<  176[H]  3.9   |  19[L]  |  1.02  Ca    9.0      16 May 2025 06:40  Phos  2.8     05-16  Mg     1.6     05-16  TPro  6.9  /  Alb  3.1[L]  /  TBili  0.6  /  DBili  x   /  AST  22  /  ALT  13  /  AlkPhos  50  05-16    EKG: A.Fib; no acute ST, T changes; Repeat EKG today: Sinus rhythm, rate controlled    Xray Chest 1 View AP/PA (05.15.25 @ 21:53)   IMPRESSION:  Cardiomegaly. RIGHT mid/lower zone airspace consolidation and/or pleural fluid.     CT Brain Stroke Protocol (05.15.25 @ 16:19)   IMPRESSION:  1. The CT study of the brain demonstrates multiple old infarcts along with generalized small vessel ischemic changes. There infarcts in both MCA territories more extensive on the right.2. The perfusion study suggests an area of penumbra surrounding the right frontal MCA infarct, raising the possibility of new ischemia. Also there are areas of hypoperfusion in the left temporal lobe as well as bilateral   frontal white matter.  3. In the neck, there is narrowing of both carotid bifurcations but calcified plaque, more extensive on the left. Approximately 50-60% narrowing suggested in the proximal left internal carotid. Approximately 30% narrowing suggested of the right internal carotid. The left vertebral is dominant with a hypoplastic right vertebral. However there is extensive calcified plaque at the left vertebral origin, as well as in the left V4 segment.4. The intracerebral vasculature appears to be patent, as outlined above.  Follow-up MR imaging of the brain is recommended for further assessment.    A/P:  Acute CVA suspect embolic in the setting of A. Fib  New onset A. Fib suspect ppt by volume depletion s/p bowel prep  Tachycardia due to intravascular volume depletion  New onset LV dysfunction/ Systolic HF (undiagnosed HF)  Elevated Troponin demand ischemia vs HTN   Hx Type 2 DM/ HTN  Mild dementia  Hx Major depression/ Urinary incontinence    Plan:  Admit to Tele; 2D Echo  Patient s/p Code stroke in ED; not a candidate for tpA as per Stroke team as reported by  ED physician  Continue Ecotrin and high dose Statin Lipitor 80 mg HS (home dose)  Patient will likely need MRI/ MRA Brain; Neuro consult Dr. Langley;   Given new LV dysfunction; will keep on gentle hydration with NS at 60cc/hr x 24 hrs;   Holding anti HTN for permissive HTN but will give low dose Metoprolol 12.5 mg q 12 hrs if HR persistently above 120  Lovenox 1mg/kg q 12 hrs for now; Check eligibility for DOCs; once coverage established will transition to Apixaban 5mg BID   Cardio Consult Dr. Harrison  A1C ADD ON: ipid panel AM  Hold oral hypoglycemics; Accu-Chek with Insulin scale  Patient passed dysphagia screen in ED; will give soft and bite sized food; official Speech and swallow eval  PT eval AM; At baseline was independent  Rest as per PGY1 MAR note above; d/w Dr. Moss and PGY3 DR. liu Patient seen and examined this morning around 9.45 AM; later met with family at bedside (Son, daughter and wife)    80yM with PMH CAD s/p 6 stents, s/p CABG 20 yrs ago on ASA could not tolerate Plavix,  HTN HLD DM Dementia presenting with left facial droop brought in by family after returning from Hospital home took a shower and wife noticed facial asymmetry   s/p recent Coronary Angio at [facility]  day prior to admission without obstructive disease planned for a Colonoscopy  for unclear reasons ??IBD eval but patient refused and patient sent home today; H/H stable; no bleeding reported;   Patient admitted with suspected acute CVA with left sided weakness and facial droop and speech disability. able to eat.   Later this morning was agitated at RN and anders Pak was called; given Haldol with calmer mood and noted to be sleeping    Vital Signs Last 24 Hrs  T(F): 97.9 (16 May 2025 11:32), Max: 98.3 (15 May 2025 18:08)  HR: 73 (16 May 2025 14:12) (68 - 122)  BP: 125/73 (16 May 2025 14:12) (109/73 - 143/89)  RR: 18 (16 May 2025 11:32) (16 - 20)  SpO2: 99% (16 May 2025 14:12) (93% - 100%): room air    P/E:  Psych: AAO x2  Neuro: B/L UE 5/5; LLE ?4-5/5; RLE 5/5; left facial droop and slurred speech  CVS: S1S2 present, regular, no edema  Resp: BLAE+, No wheeze or Rhonchi  GI: Soft, BS+, Non tender, non distended  Extr: No  calf tenderness B/L Lower extremities  Skin: Warm and moist without any rashes    Labs:                   15.8   6.47  )-----------( 192      ( 16 May 2025 06:40 )             45.7   05-16  133[L]  |  102  |  14  ----------------------------<  176[H]  3.9   |  19[L]  |  1.02  Ca    9.0      16 May 2025 06:40  Phos  2.8     05-16  Mg     1.6     05-16  TPro  6.9  /  Alb  3.1[L]  /  TBili  0.6  /  DBili  x   /  AST  22  /  ALT  13  /  AlkPhos  50  05-16    A1C with Estimated Average Glucose (05.15.25 @ 20:10) A1C with Estimated Average Glucose Result: 8.4  Lipid Profile (05.16.25 @ 06:40)   Cholesterol: 204 mg/dL; Triglycerides, Serum: 204 mg/dL; HDL Cholesterol: 33 mg/dL; Non HDL Cholesterol: 171: Direct LDL: 45 mg/dL    EKG: A.Fib; no acute ST, T changes; Repeat EKG today: Sinus rhythm, rate controlled    A/P:  Acute CVA suspect embolic in the setting of A. Fib  New onset A. Fib suspect ppt by volume depletion s/p bowel prep converted to Sinus rhythm  Tachycardia due to intravascular volume depletion  New onset LV dysfunction/ Systolic HF (undiagnosed HF)  Elevated Troponin demand ischemia vs HTN   Hx Type 2 DM/ HTN/ Mild dementia  Hx Major depression/ Urinary incontinence    Plan:  Continue Tele; 2D Echo follow up to see LV function  Patient s/p Code stroke in ED; not a candidate for tpA as per Stroke team as reported by  ED physician  Continue Ecotrin and high dose Statin Lipitor 80 mg HS (home dose)  Neuro consult appreciated; d/w Dr. Doshi; may use anti HTN judiciously as there is evidence of Carotid stenosis on CT Angio  Follow up MRI to confirm stroke an extent as patient clinically has an embolic CVA given underlying newly found A. Fib as well as CT findings of old and new stroke; Holding anti HTN for permissive HTN but will give low dose Metoprolol ER 12.5 mg starting tomorrow  Patient at some point will also benefit from ARB added given LV dysfunction  Lovenox 1mg/kg q 12 hrs for now; Checked  eligibility for DOACs; $50 copay;  will transition to Apixaban 5mg BID   Cardio Consult appreciated d/w Dr. Harrison; initially planned Digoxin lading but converted to sinus; will hold off Digoxin  Hold oral hypoglycemics; Accu-Chek with Insulin scale; A1c slightly elevated; Lipid panel noted; Ct statin high dose 80 mg HS  Patient passed dysphagia screen in ED; will give soft and bite sized food; official Speech and swallow eval  PT eval appreciated; d/w therapist Dusty; recommended Acute rehab; also recommend OT  Rest as per PGY1  note above; d/w Dr. Reyna and PGY3 Dr. Vanessa  Discussed with outpt Cardiologist Dr. Dalton To over son phone at bedside and reviewed current presentation after Cath and new stroke and likely embolic; Dr. Love also send Cath report which was essentially non obstructive and has underlying 6 stents 20 yrs ago  Discussed with wife and son/ daughter at length likely benefit form Acute Rehab especially critical recovery expected first 2 weeks as well as will get OT; wife is hesitant due to prior rehab experience after gall bladder surgery 3 years ago; eventually wife agrees after son and daughter counseling  Discussed with  Marine referral for Acute rehab placement

## 2025-05-16 NOTE — PHYSICAL THERAPY INITIAL EVALUATION ADULT - GAIT DEVIATIONS NOTED, PT EVAL
decreased temitope/decreased velocity of limb motion/decreased step length/decreased stride length/decreased weight-shifting ability

## 2025-05-16 NOTE — PROGRESS NOTE ADULT - ASSESSMENT
80yM from home, ambulates independently with PMH CAD s/p 6 stents, HTN HLD DM Dementia (AAOx2 baseline) urinary incontinence and depression presents with new onset L sided partial facial droop and slurred speech. Admitted to telemetry for stroke and new onset AFib.

## 2025-05-16 NOTE — PHYSICAL THERAPY INITIAL EVALUATION ADULT - ACTIVE RANGE OF MOTION EXAMINATION, REHAB EVAL
Lt UE WNL except shoulder ~2/3 range, Lt hip/Right UE Active ROM was WNL (within normal limits)/RLE Active ROM was WNL (within normal limits)

## 2025-05-16 NOTE — PROGRESS NOTE ADULT - NSPROGADDITIONALINFOA_GEN_ALL_CORE
CT Brain Stroke Protocol (05.15.25 @ 16:19)   IMPRESSION:  1. The CT study of the brain demonstrates multiple old infarcts along with generalized small vessel ischemic changes. There infarcts in both MCA territories more extensive on the right.2. The perfusion study suggests an area of penumbra surrounding the right frontal MCA infarct, raising the possibility of new ischemia. Also there are areas of hypoperfusion in the left temporal lobe as well as bilateral   frontal white matter.  3. In the neck, there is narrowing of both carotid bifurcations but calcified plaque, more extensive on the left. Approximately 50-60% narrowing suggested in the proximal left internal carotid. Approximately 30% narrowing suggested of the right internal carotid. The left vertebral is dominant with a hypoplastic right vertebral. However there is extensive calcified plaque at the left vertebral origin, as well as in the left V4 segment.4. The intracerebral vasculature appears to be patent, as outlined above.  Follow-up MR imaging of the brain is recommended for further assessment.

## 2025-05-16 NOTE — PROGRESS NOTE ADULT - SUBJECTIVE AND OBJECTIVE BOX
PGY-1 Progress Note discussed with attending    PLEASE MS TEAMS AUTHOR TILL 5:00 PM    PLEASE CONTACT ON CALL TEAM:  - On Call Team (Please refer to Dennis) FROM 5:00 PM - 8:30PM  - Nightfloat Team FROM 8:30 -7:30 AM      INTERVAL HPI/OVERNIGHT EVENTS: No acute events overnight.    SUBJECTIVE: Patient seen and examined at bedside this morning. ***INCOMPLETE***    ---------------------------    REVIEW OF SYSTEMS:  CONSTITUTIONAL: No fever, weight loss, fatigue  RESPIRATORY: No cough, wheezing, chills, hemoptysis, shortness of breath  CARDIOVASCULAR: No chest pain, palpitations, dizziness, leg swelling  GASTROINTESTINAL: No abdominal pain, nausea, vomiting, hematemesis, diarrhea, constipation, melena, hematochezia  GENITOURINARY: No dysuria, hematuria, urinary frequency  NEUROLOGICAL: No headaches, memory loss, loss of strength, numbness, tremors  SKIN: No itching, burning, rashes, lesions     MEDICATIONS  (STANDING):  aspirin enteric coated 81 milliGRAM(s) Oral daily  atorvastatin 80 milliGRAM(s) Oral at bedtime  dextrose 50% Injectable 25 Gram(s) IV Push once  digoxin  Injectable 250 MICROGram(s) IV Push once  donepezil 5 milliGRAM(s) Oral at bedtime  enoxaparin Injectable 85 milliGRAM(s) SubCutaneous every 12 hours  escitalopram 5 milliGRAM(s) Oral daily  insulin lispro (ADMELOG) corrective regimen sliding scale   SubCutaneous three times a day before meals  insulin lispro (ADMELOG) corrective regimen sliding scale   SubCutaneous at bedtime  metoprolol tartrate 12.5 milliGRAM(s) Oral two times a day  metoprolol tartrate Injectable 5 milliGRAM(s) IV Push once  oxybutynin XL 10 milliGRAM(s) Oral at bedtime  pantoprazole    Tablet 40 milliGRAM(s) Oral before breakfast    MEDICATIONS  (PRN):      Vital Signs Last 24 Hrs  T(C): 36.8 (16 May 2025 08:09), Max: 36.8 (15 May 2025 17:08)  T(F): 98.2 (16 May 2025 08:09), Max: 98.3 (15 May 2025 18:08)  HR: 68 (16 May 2025 08:09) (68 - 122)  BP: 109/73 (16 May 2025 08:09) (109/73 - 143/89)  BP(mean): --  RR: 18 (16 May 2025 08:09) (16 - 20)  SpO2: 98% (16 May 2025 08:09) (93% - 100%)    Parameters below as of 16 May 2025 08:09  Patient On (Oxygen Delivery Method): room air        -----------------------------    PHYSICAL EXAMINATION:  GENERAL: NAD, lying in bed  HEAD:  Atraumatic, Normocephalic  EYES:  conjunctiva and sclera clear  NECK: Supple, No JVD  CHEST/LUNG: Clear to auscultation bilaterally; No rales, rhonchi, wheezing, or rubs  HEART: Regular rate and rhythm; No murmurs, rubs, or gallops  ABDOMEN: Soft, Nontender, Nondistended; Bowel sounds present, no guarding  NERVOUS SYSTEM:  Alert & Oriented X3  : voiding well  EXTREMITIES:  2+ Peripheral Pulses, No clubbing, cyanosis, or edema  SKIN: warm dry                          15.8   6.47  )-----------( 192      ( 16 May 2025 06:40 )             45.7     05-16    133[L]  |  102  |  14  ----------------------------<  176[H]  3.9   |  19[L]  |  1.02    Ca    9.0      16 May 2025 06:40  Phos  2.8     05-16  Mg     1.6     05-16    TPro  6.9  /  Alb  3.1[L]  /  TBili  0.6  /  DBili  x   /  AST  22  /  ALT  13  /  AlkPhos  50  05-16    LIVER FUNCTIONS - ( 16 May 2025 06:40 )  Alb: 3.1 g/dL / Pro: 6.9 g/dL / ALK PHOS: 50 U/L / ALT: 13 U/L DA / AST: 22 U/L / GGT: x               PT/INR - ( 15 May 2025 16:15 )   PT: 12.4 sec;   INR: 1.07 ratio         PTT - ( 15 May 2025 16:15 )  PTT:31.7 sec    I&O's Summary          CAPILLARY BLOOD GLUCOSE      RADIOLOGY & ADDITIONAL TESTS:                   PGY-1 Progress Note discussed with attending    PLEASE MS TEAMS AUTHOR TILL 5:00 PM    PLEASE CONTACT ON CALL TEAM:  - On Call Team (Please refer to Dennis) FROM 5:00 PM - 8:30PM  - Nightfloat Team FROM 8:30 -7:30 AM      INTERVAL HPI/OVERNIGHT EVENTS: No acute events overnight.    SUBJECTIVE: Patient seen and examined at bedside this morning. AAOx2 this AM.     ---------------------------    REVIEW OF SYSTEMS:  CONSTITUTIONAL: No fever, weight loss, fatigue  RESPIRATORY: No cough, wheezing, chills, hemoptysis, shortness of breath  CARDIOVASCULAR: No chest pain, palpitations, dizziness, leg swelling  GASTROINTESTINAL: No abdominal pain, nausea, vomiting, hematemesis, diarrhea, constipation, melena, hematochezia  GENITOURINARY: No dysuria, hematuria, urinary frequency  NEUROLOGICAL: No headaches, memory loss, loss of strength, numbness, tremors  SKIN: No itching, burning, rashes, lesions     MEDICATIONS  (STANDING):  aspirin enteric coated 81 milliGRAM(s) Oral daily  atorvastatin 80 milliGRAM(s) Oral at bedtime  dextrose 50% Injectable 25 Gram(s) IV Push once  digoxin  Injectable 250 MICROGram(s) IV Push once  donepezil 5 milliGRAM(s) Oral at bedtime  enoxaparin Injectable 85 milliGRAM(s) SubCutaneous every 12 hours  escitalopram 5 milliGRAM(s) Oral daily  insulin lispro (ADMELOG) corrective regimen sliding scale   SubCutaneous three times a day before meals  insulin lispro (ADMELOG) corrective regimen sliding scale   SubCutaneous at bedtime  metoprolol tartrate 12.5 milliGRAM(s) Oral two times a day  metoprolol tartrate Injectable 5 milliGRAM(s) IV Push once  oxybutynin XL 10 milliGRAM(s) Oral at bedtime  pantoprazole    Tablet 40 milliGRAM(s) Oral before breakfast    MEDICATIONS  (PRN):      Vital Signs Last 24 Hrs  T(C): 36.8 (16 May 2025 08:09), Max: 36.8 (15 May 2025 17:08)  T(F): 98.2 (16 May 2025 08:09), Max: 98.3 (15 May 2025 18:08)  HR: 68 (16 May 2025 08:09) (68 - 122)  BP: 109/73 (16 May 2025 08:09) (109/73 - 143/89)  BP(mean): --  RR: 18 (16 May 2025 08:09) (16 - 20)  SpO2: 98% (16 May 2025 08:09) (93% - 100%)    Parameters below as of 16 May 2025 08:09  Patient On (Oxygen Delivery Method): room air        -----------------------------    PHYSICAL EXAMINATION:  GENERAL: NAD, lying in bed  HEAD:  Atraumatic, Normocephalic  EYES:  conjunctiva and sclera clear  NECK: Supple, No JVD  CHEST/LUNG: Clear to auscultation bilaterally; No rales, rhonchi, wheezing, or rubs  HEART: Regular rate and rhythm; No murmurs, rubs, or gallops  ABDOMEN: Soft, Nontender, Nondistended; Bowel sounds present, no guarding  NERVOUS SYSTEM:  Alert & Oriented X3  : voiding well  EXTREMITIES:  2+ Peripheral Pulses, No clubbing, cyanosis, or edema  SKIN: warm dry                          15.8   6.47  )-----------( 192      ( 16 May 2025 06:40 )             45.7     05-16    133[L]  |  102  |  14  ----------------------------<  176[H]  3.9   |  19[L]  |  1.02    Ca    9.0      16 May 2025 06:40  Phos  2.8     05-16  Mg     1.6     05-16    TPro  6.9  /  Alb  3.1[L]  /  TBili  0.6  /  DBili  x   /  AST  22  /  ALT  13  /  AlkPhos  50  05-16    LIVER FUNCTIONS - ( 16 May 2025 06:40 )  Alb: 3.1 g/dL / Pro: 6.9 g/dL / ALK PHOS: 50 U/L / ALT: 13 U/L DA / AST: 22 U/L / GGT: x               PT/INR - ( 15 May 2025 16:15 )   PT: 12.4 sec;   INR: 1.07 ratio         PTT - ( 15 May 2025 16:15 )  PTT:31.7 sec    I&O's Summary          CAPILLARY BLOOD GLUCOSE      RADIOLOGY & ADDITIONAL TESTS:                   PGY-1 Progress Note discussed with attending    PLEASE MS TEAMS AUTHOR TILL 5:00 PM    PLEASE CONTACT ON CALL TEAM:  - On Call Team (Please refer to Dennis) FROM 5:00 PM - 8:30PM  - Nightfloat Team FROM 8:30 -7:30 AM      INTERVAL HPI/OVERNIGHT EVENTS: No acute events overnight.    SUBJECTIVE: Patient seen and examined at bedside this morning. AAOx2 this AM, confused.    ---------------------------    REVIEW OF SYSTEMS:  CONSTITUTIONAL: No fever, weight loss, fatigue  RESPIRATORY: No cough, wheezing, chills, hemoptysis, shortness of breath  CARDIOVASCULAR: No chest pain, palpitations, dizziness, leg swelling  GASTROINTESTINAL: No abdominal pain, nausea, vomiting, hematemesis, diarrhea, constipation, melena, hematochezia  GENITOURINARY: No dysuria, hematuria, urinary frequency  NEUROLOGICAL: No headaches, memory loss, loss of strength, numbness, tremors  SKIN: No itching, burning, rashes, lesions     MEDICATIONS  (STANDING):  aspirin enteric coated 81 milliGRAM(s) Oral daily  atorvastatin 80 milliGRAM(s) Oral at bedtime  dextrose 50% Injectable 25 Gram(s) IV Push once  digoxin  Injectable 250 MICROGram(s) IV Push once  donepezil 5 milliGRAM(s) Oral at bedtime  enoxaparin Injectable 85 milliGRAM(s) SubCutaneous every 12 hours  escitalopram 5 milliGRAM(s) Oral daily  insulin lispro (ADMELOG) corrective regimen sliding scale   SubCutaneous three times a day before meals  insulin lispro (ADMELOG) corrective regimen sliding scale   SubCutaneous at bedtime  metoprolol tartrate 12.5 milliGRAM(s) Oral two times a day  metoprolol tartrate Injectable 5 milliGRAM(s) IV Push once  oxybutynin XL 10 milliGRAM(s) Oral at bedtime  pantoprazole    Tablet 40 milliGRAM(s) Oral before breakfast    MEDICATIONS  (PRN):      Vital Signs Last 24 Hrs  T(C): 36.8 (16 May 2025 08:09), Max: 36.8 (15 May 2025 17:08)  T(F): 98.2 (16 May 2025 08:09), Max: 98.3 (15 May 2025 18:08)  HR: 68 (16 May 2025 08:09) (68 - 122)  BP: 109/73 (16 May 2025 08:09) (109/73 - 143/89)  BP(mean): --  RR: 18 (16 May 2025 08:09) (16 - 20)  SpO2: 98% (16 May 2025 08:09) (93% - 100%)    Parameters below as of 16 May 2025 08:09  Patient On (Oxygen Delivery Method): room air        -----------------------------    PHYSICAL EXAMINATION:  GENERAL: NAD, lying in bed  HEAD:  Atraumatic, Normocephalic  EYES:  conjunctiva and sclera clear  NECK: Supple, No JVD  CHEST/LUNG: Clear to auscultation bilaterally; No rales, rhonchi, wheezing, or rubs  HEART: Regular rate and rhythm; No murmurs, rubs, or gallops  ABDOMEN: Soft, Nontender, Nondistended; Bowel sounds present, no guarding  NERVOUS SYSTEM:  Alert & Oriented X2. Dysarthria. +LUE drift. +LLE drift. Lower facial weakness on the left.   : voiding well  EXTREMITIES:  2+ Peripheral Pulses, No clubbing, cyanosis, or edema  SKIN: warm dry                          15.8   6.47  )-----------( 192      ( 16 May 2025 06:40 )             45.7     05-16    133[L]  |  102  |  14  ----------------------------<  176[H]  3.9   |  19[L]  |  1.02    Ca    9.0      16 May 2025 06:40  Phos  2.8     05-16  Mg     1.6     05-16    TPro  6.9  /  Alb  3.1[L]  /  TBili  0.6  /  DBili  x   /  AST  22  /  ALT  13  /  AlkPhos  50  05-16    LIVER FUNCTIONS - ( 16 May 2025 06:40 )  Alb: 3.1 g/dL / Pro: 6.9 g/dL / ALK PHOS: 50 U/L / ALT: 13 U/L DA / AST: 22 U/L / GGT: x               PT/INR - ( 15 May 2025 16:15 )   PT: 12.4 sec;   INR: 1.07 ratio         PTT - ( 15 May 2025 16:15 )  PTT:31.7 sec    I&O's Summary          CAPILLARY BLOOD GLUCOSE      RADIOLOGY & ADDITIONAL TESTS:

## 2025-05-16 NOTE — CONSULT NOTE ADULT - SUBJECTIVE AND OBJECTIVE BOX
Neurology Consultation     HPI:  Most of history per chart, patient with dementia    Mr. Lewis is a 79 yo RH man from home, ambulates independently with PMH CAD s/p 6 stents, HTN HLD DM Dementia (AAOx2 baseline) urinary incontinence and depression presents with new onset L sided partial facial droop and slurred speech. Pt endorsed SOB on exertion 3 days ago to his cardiologist, recommended a TTE which apparently showed EF of 38%. Was admitted to [facility] 2 days ago for a colonoscopy, family at bedside is unsure of the reason for it. Was NPO and did not complete bowel prep. Was brought home around 2PM, and had an episode of urinary incontinence.    Symptom onset at 2:30pm yesterday 5/15 after the patient showered, the wife noticed facial droop, slurring of his words, and drooling from the left side of his mouth. On arrival to the ED, code stroke was called, NIHSS by ED attending was 2. On examination by the admitting team, NIHSS was 5. Denies burning urination, chest pain, nausea and vomiting.      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 There infarcts in both MCA territories more extensive on the right.  - The perfusion study suggests an area of penumbra surrounding the right   frontal MCA infarct, raising the possibility of new ischemia. Also there   are areas of hypoperfusion in the left temporal lobe as well as bilateral   frontal white matter.  - Approximately 50-60% narrowing suggested in the proximal left internal carotid. Approximately 30% narrowing suggested of the right internal carotid.  s/p ASA and atorvastatin  s/p bolus NS 1L (15 May 2025 19:47)           PAST MEDICAL & SURGICAL HISTORY:  HTN (hypertension)      HLD (hyperlipidemia)  Dementia  CAD (coronary artery disease)  DM (diabetes mellitus)  Urinary incontinence  Major depression      FAMILY HISTORY:    SOCIAL HISTORY: no smoking, alcohol, drug use hx    MEDICATIONS (HOME):  Home Medications:  atorvastatin 80 mg oral tablet: 1 tab(s) orally once a day (15 May 2025 18:22)  cyanocobalamin 2500 mcg oral tablet: 1 tab(s) orally once a day (15 May 2025 18:22)  donepezil 5 mg oral tablet: 1 tab(s) orally once a day with lunch (15 May 2025 18:22)  Ecotrin Adult Low Strength 81 mg oral delayed release tablet: 1 tab(s) orally once a day (15 May 2025 18:22)  escitalopram 5 mg oral tablet: 1 tab(s) orally once a day (in the morning) (15 May 2025 18:22)  fenofibrate 160 mg oral tablet: 1 tab(s) orally once a day (15 May 2025 18:22)  glipiZIDE 10 mg oral tablet: 1 tab(s) orally 2 times a day (15 May 2025 18:22)  Jardiance 10 mg oral tablet: 1 tab(s) orally 3 times a week before breakfast (15 May 2025 18:22)  omega-3 polyunsaturated fatty acids 500 mg oral capsule: 1 cap(s) orally once a day (15 May 2025 18:22)  oxyBUTYnin 10 mg/24 hr oral tablet, extended release: 1 tab(s) orally once a day (15 May 2025 18:22)  pantoprazole 40 mg oral delayed release tablet: 1 tab(s) orally once a day (15 May 2025 18:22)  pioglitazone 45 mg oral tablet: 1 tab(s) orally once a day (15 May 2025 18:22)  ramipril 2.5 mg oral capsule: 1 cap(s) orally once a day (15 May 2025 18:22)  Vitamin D3 1250 mcg (50,000 intl units) oral capsule: 1 cap(s) orally once a week on Friday (15 May 2025 18:22)    MEDICATIONS  (STANDING):  aspirin enteric coated 81 milliGRAM(s) Oral daily  atorvastatin 80 milliGRAM(s) Oral at bedtime  dextrose 50% Injectable 25 Gram(s) IV Push once  digoxin  Injectable 250 MICROGram(s) IV Push once  donepezil 5 milliGRAM(s) Oral at bedtime  enoxaparin Injectable 85 milliGRAM(s) SubCutaneous every 12 hours  escitalopram 5 milliGRAM(s) Oral daily  insulin lispro (ADMELOG) corrective regimen sliding scale   SubCutaneous three times a day before meals  insulin lispro (ADMELOG) corrective regimen sliding scale   SubCutaneous at bedtime  metoprolol tartrate 12.5 milliGRAM(s) Oral two times a day  metoprolol tartrate Injectable 5 milliGRAM(s) IV Push once  oxybutynin XL 10 milliGRAM(s) Oral at bedtime  pantoprazole    Tablet 40 milliGRAM(s) Oral before breakfast    MEDICATIONS  (PRN):    ALLERGIES/INTOLERANCES:  Allergies  Plavix (Unknown)    Intolerances    VITALS & EXAMINATION:  Vital Signs Last 24 Hrs  T(C): 36.8 (16 May 2025 08:09), Max: 36.8 (15 May 2025 17:08)  T(F): 98.2 (16 May 2025 08:09), Max: 98.3 (15 May 2025 18:08)  HR: 68 (16 May 2025 08:09) (68 - 122)  BP: 109/73 (16 May 2025 08:09) (109/73 - 143/89)  BP(mean): --  RR: 18 (16 May 2025 08:09) (16 - 20)  SpO2: 98% (16 May 2025 08:09) (93% - 100%)    Parameters below as of 16 May 2025 08:09  Patient On (Oxygen Delivery Method): room air        General:  Constitutional: Male, appears stated age, nontoxic, not in distress,    Head: Normocephalic;   Eyes: clear sclera;   Extremities: No cyanosis;   Resp: breathing comfortably  Neck: no nuchal rigidity       Neurological (>12):    Awake, oriented to self, "hospital", cannot tell year or month. States his age  Repeats well, names well. Follows two step crossed commands with re-try  speech is fluent but mildly dysarthric    PERRL, EOM full  VFF finger counting  L lower droop  Tongue midline  Symemtric sensation to touch v1-3 bilaterally    +L pronator drift, +Orbitting around the left  L arm 4+/5 proximally, L  5-/5  L leg 5-/5 throughout  R side full, arm and leg    Sensation to touch intact in all limbs  FNF normal bilat  Gait deferred    LABORATORY:  CBC                       15.8   6.47  )-----------( 192      ( 16 May 2025 06:40 )             45.7     Chem 05-16    133[L]  |  102  |  14  ----------------------------<  176[H]  3.9   |  19[L]  |  1.02    Ca    9.0      16 May 2025 06:40  Phos  2.8     05-16  Mg     1.6     05-16    TPro  6.9  /  Alb  3.1[L]  /  TBili  0.6  /  DBili  x   /  AST  22  /  ALT  13  /  AlkPhos  50  05-16    LFTs LIVER FUNCTIONS - ( 16 May 2025 06:40 )  Alb: 3.1 g/dL / Pro: 6.9 g/dL / ALK PHOS: 50 U/L / ALT: 13 U/L DA / AST: 22 U/L / GGT: x           Coagulopathy PT/INR - ( 15 May 2025 16:15 )   PT: 12.4 sec;   INR: 1.07 ratio         PTT - ( 15 May 2025 16:15 )  PTT:31.7 sec  Lipid Panel 05-16 Chol 204[H] LDL -- HDL 33[L] Trig 204[H]  A1c   Cardiac enzymes     U/A Urinalysis Basic - ( 16 May 2025 06:40 )    Color: x / Appearance: x / SG: x / pH: x  Gluc: 176 mg/dL / Ketone: x  / Bili: x / Urobili: x   Blood: x / Protein: x / Nitrite: x   Leuk Esterase: x / RBC: x / WBC x   Sq Epi: x / Non Sq Epi: x / Bacteria: x      CSF  Other    STUDIES & IMAGING: (EEG, CT, MR, U/S, TTE/SYD):
MR#[id number]  PATIENT NAME:-TIAGO EAGLE    DATE OF SERVICE: 05-16-25 @ 09:52  Patient was seen and examined by Alfredo Harrison MD on    05-16-25 @ 09:52 .  Interim events noted.Consultant notes ,Labs,Telemetry reviewed by me       HOSPITAL COURSE: HPI:  80yM from home, ambulates independently with PMH CAD s/p 6 stents, HTN HLD DM Dementia (AAOx2 baseline) urinary incontinence and depression presents with new onset L sided partial facial droop and slurred speech. Pt endorsed SOB on exertion 3 days ago to his cardiologist, recommended a TTE which apparently showed EF of 38%. Was admitted to [facility] days ago for a colonoscopy, family at bedside is unsure of the reason for it. Was NPO and did not complete bowel prep. Was brought home around 2PM, and had an episode of urinary incontinence. At 2:30 after the patient showered, the wife noticed facial droop, slurring of his words, and drooling from the left side of his mouth. On arrival to the ED, code stroke was called, NIHSS by ED attending was 2. On examination by the admitting team, NIHSS was 5. Denies burning urination, chest pain, nausea and vomiting.    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 There infarcts in both MCA territories more extensive on the right.  - The perfusion study suggests an area of penumbra surrounding the right   frontal MCA infarct, raising the possibility of new ischemia. Also there   are areas of hypoperfusion in the left temporal lobe as well as bilateral   frontal white matter.  - Approximately 50-60% narrowing suggested in the proximal left internal carotid. Approximately 30% narrowing suggested of the right internal carotid.  s/p ASA and atorvastatin  s/p bolus NS 1L (15 May 2025 19:47)      INTERIM EVENTS:Patient seen at bedside ,interim events noted.      PMH -reviewed admission note, no change since admission  HEART FAILURE: Acute[ ]Chronic[ ] Systolic[ ] Diastolic[ ] Combined Systolic and Diastolic[ ]  CAD[ ] CABG[ ] PCI[ ]  DEVICES[ ] PPM[ ] ICD[ ] ILR[ ]  ATRIAL FIBRILLATION[ ] Paroxysmal[ ] Permanent[ ] CHADS2-[  ]  MICHAEL[ ] CKD1[ ] CKD2[ ] CKD3[ ] CKD4[ ] ESRD[ ]  COPD[ ] HTN[ ]   DM[ ] Type1[ ] Type 2[ ]   CVA[ ] Paresis[ ]    AMBULATION: Assisted[ ] Cane/walker[ ] Independent[ ]    MEDICATIONS  (STANDING):  apixaban 5 milliGRAM(s) Oral every 12 hours  aspirin enteric coated 81 milliGRAM(s) Oral daily  atorvastatin 80 milliGRAM(s) Oral at bedtime  dextrose 50% Injectable 25 Gram(s) IV Push once  donepezil 5 milliGRAM(s) Oral at bedtime  escitalopram 5 milliGRAM(s) Oral daily  haloperidol    Injectable 2 milliGRAM(s) IntraMuscular once  insulin lispro (ADMELOG) corrective regimen sliding scale   SubCutaneous three times a day before meals  insulin lispro (ADMELOG) corrective regimen sliding scale   SubCutaneous at bedtime  oxybutynin XL 10 milliGRAM(s) Oral at bedtime  pantoprazole    Tablet 40 milliGRAM(s) Oral before breakfast    MEDICATIONS  (PRN):            REVIEW OF SYSTEMS:  Constitutional: [ ] fever, [ ]weight loss,  [ ]fatigue [ ]weight gain  Eyes: [ ] visual changes  Respiratory: [ ]shortness of breath;  [ ] cough, [ ]wheezing, [ ]chills, [ ]hemoptysis  Cardiovascular: [ ] chest pain, [ ]palpitations, [ ]dizziness,  [ ]leg swelling[ ]orthopnea[ ]PND  Gastrointestinal: [ ] abdominal pain, [ ]nausea, [ ]vomiting,  [ ]diarrhea [ ]Constipation [ ]Melena  Genitourinary: [ ] dysuria, [ ] hematuria [ ]Freeman  Neurologic: [ ] headaches [ ] tremors[ ]weakness [ ]Paralysis Right[ ] Left[ ]  Skin: [ ] itching, [ ]burning, [ ] rashes  Endocrine: [ ] heat or cold intolerance  Musculoskeletal: [ ] joint pain or swelling; [ ] muscle, back, or extremity pain  Psychiatric: [ ] depression, [ ]anxiety, [ ]mood swings, or [ ]difficulty sleeping  Hematologic: [ ] easy bruising, [ ] bleeding gums    [ ] All remaining systems negative except as per above.   [ ]Unable to obtain.  [x] No change in ROS since admission      Vital Signs Last 24 Hrs  T(C): 36.5 (16 May 2025 20:32), Max: 36.8 (16 May 2025 08:09)  T(F): 97.7 (16 May 2025 20:32), Max: 98.2 (16 May 2025 08:09)  HR: 76 (16 May 2025 20:32) (68 - 122)  BP: 128/65 (16 May 2025 20:32) (109/73 - 141/95)  BP(mean): --  RR: 18 (16 May 2025 20:32) (18 - 18)  SpO2: 97% (16 May 2025 20:32) (93% - 99%)    Parameters below as of 16 May 2025 20:32  Patient On (Oxygen Delivery Method): room air      I&O's Summary      PHYSICAL EXAM:  General: No acute distress BMI-  HEENT: EOMI, PERRL  Neck: Supple, [ ] JVD  Lungs: Equal air entry bilaterally; [ ] rales [ ] wheezing [ ] rhonchi  Heart: Regular rate and rhythm; [x ] murmur   2/6 [ x] systolic [ ] diastolic [ ] radiation[ ] rubs [ ]  gallops  Abdomen: Nontender, bowel sounds present  Extremities: No clubbing, cyanosis, [ ] edema [ ]Pulses  equal and intact  Nervous system:  Alert & Oriented X3, no focal deficits  Psychiatric: Normal affect  Skin: No rashes or lesions    LABS:  05-16    133[L]  |  102  |  14  ----------------------------<  176[H]  3.9   |  19[L]  |  1.02    Ca    9.0      16 May 2025 06:40  Phos  2.8     05-16  Mg     1.6     05-16    TPro  6.9  /  Alb  3.1[L]  /  TBili  0.6  /  DBili  x   /  AST  22  /  ALT  13  /  AlkPhos  50  05-16    Creatinine Trend: 1.02<--, 1.27<--                        15.8   6.47  )-----------( 192      ( 16 May 2025 06:40 )             45.7     PT/INR - ( 15 May 2025 16:15 )   PT: 12.4 sec;   INR: 1.07 ratio         PTT - ( 15 May 2025 16:15 )  PTT:31.7 sec

## 2025-05-16 NOTE — PHYSICAL THERAPY INITIAL EVALUATION ADULT - GENERAL OBSERVATIONS, REHAB EVAL
Consult received, chart reviewed. Patient received supine in bed, NAD, +tele. MD De Luna, spouse and 2 children bedside. Patient agreed to EVALUATION from Physical Therapist.

## 2025-05-16 NOTE — PHYSICAL THERAPY INITIAL EVALUATION ADULT - DIAGNOSIS, PT EVAL
(ICF Model) Pt. present w/deficits in Body Structures/Function (Impairments), incl: Strength, Balance, ROM, coordination, attention leading to deficits in performing the below noted Activities (Limitations).

## 2025-05-16 NOTE — PHYSICAL THERAPY INITIAL EVALUATION ADULT - MANUAL MUSCLE TESTING RESULTS, REHAB EVAL
RUE 4+/5, LE 4/5  except shoulder 3-/5. Hips at least 4-/5, knees 4+/5, Ankles at least 3+/5 functionally

## 2025-05-16 NOTE — PHYSICAL THERAPY INITIAL EVALUATION ADULT - IMPAIRMENTS FOUND, PT EVAL
attention/cognitive impairment/fine motor/gait, locomotion, and balance/gross motor/muscle strength/ROM

## 2025-05-16 NOTE — PHYSICAL THERAPY INITIAL EVALUATION ADULT - MODIFIED CLINICAL TEST OF SENSORY INTEGRATION IN BALANCE TEST
Postural Assessment Stroke Scale Total Score (PASS): 25/36 Total; (Maintain Posture Subscale = 8/15)+(Change Posture Subscale = 14/21): Lower scores equates to greater impairment in postural control.

## 2025-05-16 NOTE — CONSULT NOTE ADULT - TIME BILLING
- Review of records, telemetry, vital signs and daily labs.   - General and cardiovascular physical examination.  - Generation of cardiovascular treatment plan and completion of note .  - Coordination of care.      Patient was seen and examined by me on  5/16/25,interim events noted,labs and radiology studies reviewed.  Alfredo Harrison MD,FACC.  [address] [place], [place] [id number].  [phone]  Availabe to call or text on [employer] TE[employer].

## 2025-05-16 NOTE — PHYSICAL THERAPY INITIAL EVALUATION ADULT - NSPTDISCHREC_GEN_A_CORE
Acute Rehab  Pt. is highly motivated to regain prior level of function and, when medically ready for d/c, is anticipated to tolerate multidisciplinary, restorative therapies for 3hr day x at least 5 days week OR alternative frequency & session duration consisting of at least 15hrs within 7 day initial period as determined by center.    With AR services, pt. is anticipated to make significant gains in function, within a reasonable period, and with a high likelihood of return to a supportive home environment. AR discussed with pt., including why it is recommended over lower levels of care, and pt. is interested/motivated to for AR.

## 2025-05-16 NOTE — PHYSICAL THERAPY INITIAL EVALUATION ADULT - PERSONAL SAFETY AND JUDGMENT, REHAB EVAL
Patient wants us to be aware if we schedule DEXA and [facility] please do not schedule it for 09/14/20, 09/28/20, or 10/12/20 because she already has other appointments on those dates. Patient also would like a flu shot, informed patient we do not have that in office yet and unsure of date to expect it. But welcome to call back and check in a couple weeks. intact

## 2025-05-16 NOTE — PHYSICAL THERAPY INITIAL EVALUATION ADULT - PLANNED THERAPY INTERVENTIONS, PT EVAL
balance training/bed mobility training/gait training/neuromuscular re-education/postural re-education/ROM/transfer training
Fall with Harm Risk

## 2025-05-16 NOTE — CHART NOTE - NSCHARTNOTEFT_GEN_A_CORE
Patient found to have uptrending troponin overnight. Patient remains asymptomatic. Repeat ECG unchanged, no signs of ischemia. As per Cardio Dr. Harrison, patient can remain on FD Lovenox for now. Started digoxin load for persistent Afib with RVR.

## 2025-05-17 LAB
ALBUMIN SERPL ELPH-MCNC: 3 G/DL — LOW (ref 3.5–5)
ALP SERPL-CCNC: 49 U/L — SIGNIFICANT CHANGE UP (ref 40–120)
ALT FLD-CCNC: 11 U/L DA — SIGNIFICANT CHANGE UP (ref 10–60)
ANION GAP SERPL CALC-SCNC: 5 MMOL/L — SIGNIFICANT CHANGE UP (ref 5–17)
AST SERPL-CCNC: 24 U/L — SIGNIFICANT CHANGE UP (ref 10–40)
BASOPHILS # BLD AUTO: 0.03 K/UL — SIGNIFICANT CHANGE UP (ref 0–0.2)
BASOPHILS NFR BLD AUTO: 0.5 % — SIGNIFICANT CHANGE UP (ref 0–2)
BILIRUB SERPL-MCNC: 0.7 MG/DL — SIGNIFICANT CHANGE UP (ref 0.2–1.2)
BUN SERPL-MCNC: 16 MG/DL — SIGNIFICANT CHANGE UP (ref 7–18)
CALCIUM SERPL-MCNC: 9.3 MG/DL — SIGNIFICANT CHANGE UP (ref 8.4–10.5)
CHLORIDE SERPL-SCNC: 105 MMOL/L — SIGNIFICANT CHANGE UP (ref 96–108)
CO2 SERPL-SCNC: 23 MMOL/L — SIGNIFICANT CHANGE UP (ref 22–31)
CREAT SERPL-MCNC: 1.03 MG/DL — SIGNIFICANT CHANGE UP (ref 0.5–1.3)
EGFR: 74 ML/MIN/1.73M2 — SIGNIFICANT CHANGE UP
EGFR: 74 ML/MIN/1.73M2 — SIGNIFICANT CHANGE UP
EOSINOPHIL # BLD AUTO: 0.23 K/UL — SIGNIFICANT CHANGE UP (ref 0–0.5)
EOSINOPHIL NFR BLD AUTO: 4 % — SIGNIFICANT CHANGE UP (ref 0–6)
GLUCOSE BLDC GLUCOMTR-MCNC: 143 MG/DL — HIGH (ref 70–99)
GLUCOSE BLDC GLUCOMTR-MCNC: 147 MG/DL — HIGH (ref 70–99)
GLUCOSE BLDC GLUCOMTR-MCNC: 206 MG/DL — HIGH (ref 70–99)
GLUCOSE BLDC GLUCOMTR-MCNC: 206 MG/DL — HIGH (ref 70–99)
GLUCOSE SERPL-MCNC: 142 MG/DL — HIGH (ref 70–99)
HCT VFR BLD CALC: 43.8 % — SIGNIFICANT CHANGE UP (ref 39–50)
HGB BLD-MCNC: 15.3 G/DL — SIGNIFICANT CHANGE UP (ref 13–17)
IMM GRANULOCYTES NFR BLD AUTO: 0.2 % — SIGNIFICANT CHANGE UP (ref 0–0.9)
LYMPHOCYTES # BLD AUTO: 1.3 K/UL — SIGNIFICANT CHANGE UP (ref 1–3.3)
LYMPHOCYTES # BLD AUTO: 22.8 % — SIGNIFICANT CHANGE UP (ref 13–44)
MAGNESIUM SERPL-MCNC: 1.6 MG/DL — SIGNIFICANT CHANGE UP (ref 1.6–2.6)
MCHC RBC-ENTMCNC: 29.3 PG — SIGNIFICANT CHANGE UP (ref 27–34)
MCHC RBC-ENTMCNC: 34.9 G/DL — SIGNIFICANT CHANGE UP (ref 32–36)
MCV RBC AUTO: 83.9 FL — SIGNIFICANT CHANGE UP (ref 80–100)
MONOCYTES # BLD AUTO: 0.62 K/UL — SIGNIFICANT CHANGE UP (ref 0–0.9)
MONOCYTES NFR BLD AUTO: 10.9 % — SIGNIFICANT CHANGE UP (ref 2–14)
NEUTROPHILS # BLD AUTO: 3.51 K/UL — SIGNIFICANT CHANGE UP (ref 1.8–7.4)
NEUTROPHILS NFR BLD AUTO: 61.6 % — SIGNIFICANT CHANGE UP (ref 43–77)
NRBC BLD AUTO-RTO: 0 /100 WBCS — SIGNIFICANT CHANGE UP (ref 0–0)
PHOSPHATE SERPL-MCNC: 3 MG/DL — SIGNIFICANT CHANGE UP (ref 2.5–4.5)
PLATELET # BLD AUTO: 180 K/UL — SIGNIFICANT CHANGE UP (ref 150–400)
POTASSIUM SERPL-MCNC: 3.9 MMOL/L — SIGNIFICANT CHANGE UP (ref 3.5–5.3)
POTASSIUM SERPL-SCNC: 3.9 MMOL/L — SIGNIFICANT CHANGE UP (ref 3.5–5.3)
PROT SERPL-MCNC: 6.8 G/DL — SIGNIFICANT CHANGE UP (ref 6–8.3)
RBC # BLD: 5.22 M/UL — SIGNIFICANT CHANGE UP (ref 4.2–5.8)
RBC # FLD: 13.2 % — SIGNIFICANT CHANGE UP (ref 10.3–14.5)
SODIUM SERPL-SCNC: 133 MMOL/L — LOW (ref 135–145)
WBC # BLD: 5.7 K/UL — SIGNIFICANT CHANGE UP (ref 3.8–10.5)
WBC # FLD AUTO: 5.7 K/UL — SIGNIFICANT CHANGE UP (ref 3.8–10.5)

## 2025-05-17 PROCEDURE — 99233 SBSQ HOSP IP/OBS HIGH 50: CPT

## 2025-05-17 RX ORDER — HALOPERIDOL 10 MG/1
1 TABLET ORAL ONCE
Refills: 0 | Status: DISCONTINUED | OUTPATIENT
Start: 2025-05-17 | End: 2025-05-19

## 2025-05-17 RX ADMIN — DONEPEZIL HYDROCHLORIDE 5 MILLIGRAM(S): 5 TABLET ORAL at 21:49

## 2025-05-17 RX ADMIN — INSULIN LISPRO 2: 100 INJECTION, SOLUTION INTRAVENOUS; SUBCUTANEOUS at 12:35

## 2025-05-17 RX ADMIN — ATORVASTATIN CALCIUM 80 MILLIGRAM(S): 80 TABLET, FILM COATED ORAL at 21:48

## 2025-05-17 RX ADMIN — ESCITALOPRAM OXALATE 5 MILLIGRAM(S): 20 TABLET ORAL at 12:41

## 2025-05-17 RX ADMIN — Medication 40 MILLIGRAM(S): at 05:35

## 2025-05-17 RX ADMIN — APIXABAN 5 MILLIGRAM(S): 2.5 TABLET, FILM COATED ORAL at 17:24

## 2025-05-17 RX ADMIN — Medication 81 MILLIGRAM(S): at 12:41

## 2025-05-17 RX ADMIN — APIXABAN 5 MILLIGRAM(S): 2.5 TABLET, FILM COATED ORAL at 05:35

## 2025-05-17 RX ADMIN — OXYBUTYNIN CHLORIDE 10 MILLIGRAM(S): 5 TABLET, FILM COATED, EXTENDED RELEASE ORAL at 21:48

## 2025-05-17 RX ADMIN — METOPROLOL SUCCINATE 12.5 MILLIGRAM(S): 50 TABLET, EXTENDED RELEASE ORAL at 05:36

## 2025-05-17 NOTE — PROGRESS NOTE ADULT - ASSESSMENT
80yM from home, ambulates independently with PMH CAD s/p 6 stents, HTN HLD DM Dementia (AAOx2 baseline) urinary incontinence and depression presents with new onset L sided partial facial droop and slurred speech. Admitted to telemetry for stroke and new onset AFib.    # Stroke.   # New onset atrial fibrillation.   # HTN  #HLD  F/u MRI  neuro f/u   - C/w on FD lovenox  - Started on Metoprolol 12.5 BID for rate control  - Tele monitoring (Goal rate <110)  - f/u TTE  -  atorvastatin 80mg  - tele

## 2025-05-17 NOTE — SWALLOW BEDSIDE ASSESSMENT ADULT - ORAL PHASE
Decreased anterior-posterior movement of the bolus/Delayed oral transit time/Stasis in lateral sulci/Lingual stasis Delayed oral transit time

## 2025-05-17 NOTE — PROGRESS NOTE ADULT - SUBJECTIVE AND OBJECTIVE BOX
Patient seen and examined this morning around 9.45 AM; later met with family at bedside (Son, daughter and wife)    80yM with PMH CAD s/p 6 stents, s/p CABG 20 yrs ago on ASA could not tolerate Plavix,  HTN HLD DM Dementia presenting with left facial droop brought in by family after returning from Hospital home took a shower and wife noticed facial asymmetry   s/p recent Coronary Angio at [facility]  day prior to admission without obstructive disease planned for a Colonoscopy  for unclear reasons ??IBD eval but patient refused and patient sent home today; H/H stable; no bleeding reported;   Patient admitted with suspected acute CVA with left sided weakness and facial droop and speech disability. able to eat.   Later this morning was agitated at RN and and[employer] Pak was called; given Haldol with calmer mood and noted to be sleeping    MEDICATIONS  (STANDING):  apixaban 5 milliGRAM(s) Oral every 12 hours  aspirin enteric coated 81 milliGRAM(s) Oral daily  atorvastatin 80 milliGRAM(s) Oral at bedtime  dextrose 50% Injectable 25 Gram(s) IV Push once  donepezil 5 milliGRAM(s) Oral at bedtime  escitalopram 5 milliGRAM(s) Oral daily  haloperidol    Injectable 2 milliGRAM(s) IntraMuscular once  insulin lispro (ADMELOG) corrective regimen sliding scale   SubCutaneous three times a day before meals  insulin lispro (ADMELOG) corrective regimen sliding scale   SubCutaneous at bedtime  metoprolol succinate ER 12.5 milliGRAM(s) Oral daily  oxybutynin XL 10 milliGRAM(s) Oral at bedtime  pantoprazole    Tablet 40 milliGRAM(s) Oral before breakfast    MEDICATIONS  (PRN):      Vital Signs Last 24 Hrs  T(C): 36.3 (17 May 2025 11:03), Max: 36.6 (16 May 2025 17:16)  T(F): 97.4 (17 May 2025 11:03), Max: 97.8 (16 May 2025 17:16)  HR: 76 (17 May 2025 11:03) (66 - 76)  BP: 102/69 (17 May 2025 11:03) (102/69 - 134/97)  BP(mean): 71 (17 May 2025 11:03) (71 - 93)  RR: 18 (17 May 2025 11:03) (18 - 18)  SpO2: 96% (17 May 2025 11:03) (95% - 99%): room air      P/E:  Psych: AAO x2  Neuro: B/L UE 5/5; LLE ?4-5/5; RLE 5/5; left facial droop and slurred speech  CVS: S1S2 present, regular, no edema  Resp: BLAE+, No wheeze or Rhonchi  GI: Soft, BS+, Non tender, non distended  Extr: No  calf tenderness B/L Lower extremities  Skin: Warm and moist without any rashes    Labs:                              15.3   5.70  )-----------( 180      ( 17 May 2025 07:14 )             43.8   05-17    133[L]  |  105  |  16  ----------------------------<  142[H]  3.9   |  23  |  1.03    Ca    9.3      17 May 2025 07:14  Phos  3.0     05-17  Mg     1.6     05-17    TPro  6.8  /  Alb  3.0[L]  /  TBili  0.7  /  DBili  x   /  AST  24  /  ALT  11  /  AlkPhos  49  05-17      A1C with Estimated Average Glucose (05.15.25 @ 20:10) A1C with Estimated Average Glucose Result: 8.4  Lipid Profile (05.16.25 @ 06:40)   Cholesterol: 204 mg/dL; Triglycerides, Serum: 204 mg/dL; HDL Cholesterol: 33 mg/dL; Non HDL Cholesterol: 171: Direct LDL: 45 mg/dL    EKG: A.Fib; no acute ST, T changes; Repeat EKG today: Sinus rhythm, rate controlled     Patient seen and examined this morning around 10.30 AM and  later met with family at bedside this afternoon, (daughter and wife)    80yM with PMH CAD s/p 6 stents, s/p CABG 20 yrs ago on ASA could not tolerate Plavix,  HTN HLD DM Dementia presenting with left facial droop brought in by family after returning from Hospital home took a shower and wife noticed facial asymmetry   s/p recent Coronary Angio at [facility]  day prior to admission without obstructive disease planned for a Colonoscopy  for unclear reasons ??IBD eval but patient refused and patient sent home today; H/H stable; no bleeding reported;   Patient admitted with suspected acute CVA with left sided weakness and facial droop and speech disability.    Patient noted to have acute CVA; +dysarthria; seen by speech and swallow cleared for pureed diet and thin liquids  OOB to chair; .No agitation today.    MEDICATIONS  (STANDING):  apixaban 5 milliGRAM(s) Oral every 12 hours  aspirin enteric coated 81 milliGRAM(s) Oral daily  atorvastatin 80 milliGRAM(s) Oral at bedtime  dextrose 50% Injectable 25 Gram(s) IV Push once  donepezil 5 milliGRAM(s) Oral at bedtime  escitalopram 5 milliGRAM(s) Oral daily  haloperidol    Injectable 2 milliGRAM(s) IntraMuscular once  insulin lispro (ADMELOG) corrective regimen sliding scale   SubCutaneous three times a day before meals  insulin lispro (ADMELOG) corrective regimen sliding scale   SubCutaneous at bedtime  metoprolol succinate ER 12.5 milliGRAM(s) Oral daily  oxybutynin XL 10 milliGRAM(s) Oral at bedtime  pantoprazole    Tablet 40 milliGRAM(s) Oral before breakfast    MEDICATIONS  (PRN):      Vital Signs Last 24 Hrs  T(C): 36.3 (17 May 2025 11:03), Max: 36.6 (16 May 2025 17:16)  T(F): 97.4 (17 May 2025 11:03), Max: 97.8 (16 May 2025 17:16)  HR: 76 (17 May 2025 11:03) (66 - 76)  BP: 102/69 (17 May 2025 11:03) (102/69 - 134/97)  BP(mean): 71 (17 May 2025 11:03) (71 - 93)  RR: 18 (17 May 2025 11:03) (18 - 18)  SpO2: 96% (17 May 2025 11:03) (95% - 99%): room air      P/E:  Psych: AAO x2  Neuro: B/L UE 5/5; LLE ?4-5/5; RLE 5/5; left facial droop and slurred speech  CVS: S1S2 present, regular, no edema  Resp: BLAE+, No wheeze or Rhonchi  GI: Soft, BS+, Non tender, non distended  Extr: No  calf tenderness B/L Lower extremities  Skin: Warm and moist without any rashes    Labs:                              15.3   5.70  )-----------( 180      ( 17 May 2025 07:14 )             43.8   05-17    133[L]  |  105  |  16  ----------------------------<  142[H]  3.9   |  23  |  1.03    Ca    9.3      17 May 2025 07:14  Phos  3.0     05-17  Mg     1.6     05-17  TPro  6.8  /  Alb  3.0[L]  /  TBili  0.7  /  DBili  x   /  AST  24  /  ALT  11  /  AlkPhos  49  05-17    Troponin I, High Sensitivity (05.16.25 @ 21:54) Troponin I, High Sensitivity Result: 2980.3:  Troponin I, High Sensitivity (05.16.25 @ 19:01) Troponin I, High Sensitivity Result: 3328.1:  Troponin I, High Sensitivity (05.16.25 @ 14:58) Troponin I, High Sensitivity Result: 3968.9:  Troponin I, High Sensitivity (05.15.25 @ 22:40) Troponin I, High Sensitivity Result: 1008.0    A1C with Estimated Average Glucose (05.15.25 @ 20:10) A1C with Estimated Average Glucose Result: 8.4  Lipid Profile (05.16.25 @ 06:40)   Cholesterol: 204 mg/dL; Triglycerides, Serum: 204 mg/dL; HDL Cholesterol: 33 mg/dL; Non HDL Cholesterol: 171: Direct LDL: 45 mg/dL    EKG: A.Fib; no acute ST, T changes; Repeat EKG: Sinus rhythm, rate controlled    TTE W or WO Ultrasound Enhancing Agent (05.16.25 @ 14:19)     CONCLUSIONS:   1. Technically difficult image quality.   2. Left atrium is mildly dilated.   3. Left ventricular systolic function is moderately decreased with an ejection fraction visually estimated at 35 to 40 %.   4. There is increased LV mass and eccentric hypertrophy.   5. There is moderate (grade 2) left ventricular diastolic dysfunction.   6. Normal right ventricular cavity size and probably normal right ventricular systolic function.    MR Head No Cont (05.16.25 @ 16:26)     FINDINGS:  BRAIN PARENCHYMA:  Moderate atrophy. Very small acute infarct inferomedial aspect of the right cerebellar hemisphere. There is a small to moderate acute infarct involving the right motor strip. Additional punctate vague acute infarcts bilateral frontal lobes. Infarcts in different vascular territories suggest embolic disease.  Small to moderate chronic infarcts right frontal and bilateral parietal lobes. Chronic lacunar infarcts and/or prominent perivascular spaces within the bilateral basal ganglia. Mild to moderate chronic periventricular and subcortical white matter ischemic changes.No parenchymal mass or mass effect. No parenchymal hematoma. Atrophic corpus callosum. No cerebellar tonsillar ectopia.    VENTRICLES:  There is no hydrocephalus.   There is no midline shift.    EXTRA-AXIAL:  No extra-axial mass.  No evidence of a subdural or epidural collection.  Patent basal cisterns.    OTHER:  No air-fluid levels within the paranasal sinuses.    IMPRESSION:  Multiple acute infarcts as described largest involving the right motor strip. Exclude embolic disease..

## 2025-05-17 NOTE — PROGRESS NOTE ADULT - ASSESSMENT
A/P:  Acute CVA suspect embolic in the setting of A. Fib  New onset A. Fib suspect ppt by volume depletion s/p bowel prep converted to Sinus rhythm  Tachycardia due to intravascular volume depletion  New onset LV dysfunction/ Systolic HF (undiagnosed HF)  Elevated Troponin demand ischemia vs HTN   Hx Type 2 DM/ HTN/ Mild dementia  Hx Major depression/ Urinary incontinence    Plan:  Continue Tele; 2D Echo follow up to see LV function  Patient s/p Code stroke in ED; not a candidate for tpA as per Stroke team as reported by  ED physician  Continue Ecotrin and high dose Statin Lipitor 80 mg HS (home dose)  Neuro consult appreciated; d/w Dr. Doshi; may use anti HTN judiciously as there is evidence of Carotid stenosis on CT Angio  Follow up MRI to confirm stroke an extent as patient clinically has an embolic CVA given underlying newly found A. Fib as well as CT findings of old and new stroke; Holding anti HTN for permissive HTN but will give low dose Metoprolol ER 12.5 mg starting tomorrow  Patient at some point will also benefit from ARB added given LV dysfunction  Lovenox 1mg/kg q 12 hrs for now; Checked  eligibility for DOACs; $50 copay;  will transition to Apixaban 5mg BID   Cardio Consult appreciated d/w Dr. Harrison; initially planned Digoxin lading but converted to sinus; will hold off Digoxin  Hold oral hypoglycemics; Accu-Chek with Insulin scale; A1c slightly elevated; Lipid panel noted; Ct statin high dose 80 mg HS  Patient passed dysphagia screen in ED; will give soft and bite sized food; official Speech and swallow eval  PT eval appreciated; d/w therapist Dusty; recommended Acute rehab; also recommend OT  Rest as per PGY1  note above; d/w Dr. Reyna and PGY3 Dr. Vanessa  Discussed with outpt Cardiologist Dr. Dalton To over son phone at bedside and reviewed current presentation after Cath and new stroke and likely embolic; Dr. Love also send Cath report which was essentially non obstructive and has underlying 6 stents 20 yrs ago  Discussed with wife and son/ daughter at length likely benefit form Acute Rehab especially critical recovery expected first 2 weeks as well as will get OT; wife is hesitant due to prior rehab experience after gall bladder surgery 3 years ago; eventually wife agrees after son and daughter counseling  Discussed with  [facility] referral for Acute rehab placement.  A/P:  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 Fib  New onset A. Fib suspect ppt by volume depletion s/p bowel prep converted to Sinus rhythm (PAF)  Tachycardia due to intravascular volume depletion now rate controlled  New onset LV dysfunction/ Systolic HF (undiagnosed HF)  Elevated Troponin demand ischemia vs HTN   Hx Type 2 DM/ HTN/ Mild dementia  Hx Major depression/ Urinary incontinence    Plan:  Patient s/p Code stroke in ED; not a candidate for tpA as per Stroke team as reported by  ED physician  Continue Ecotrin and high dose Statin Lipitor 80 mg HS (home dose)  Continue Tele; 2D Echo noted LVEF 35 to 40%  Neuro consult appreciated; d/w Dr. Doshi; may use anti HTN judiciously as there is evidence of Carotid stenosis on CT Angio  MRI with multiple  strokes involving multiple areas c/w  embolic CVA given underlying newly found A. Fib as well as CT findings of old and new stroke;  Out of window for permissive HTN; will give low dose Metoprolol ER 12.5 mg provided SBP >110 mm Hg  Patient at some point will also benefit from ARB added given LV dysfunction  Lovenox 1mg/kg q 12 hrs for now; Checked  eligibility for DOACs; $50 copay; transition to Apixaban 5mg BID   Cardio Consult appreciated d/w Dr. Harrison; initially planned Digoxin lading but converted to sinus; will hold off Digoxin  Hold oral hypoglycemics; Accu-Chek with Insulin scale; A1c slightly elevated; Lipid panel noted; Ct statin high dose 80 mg HS  Resume oral hypoglycemic as patient is on a diet  Speech and swallow eval appreciated;  soft and bite sized food; thin liquids okay  PT eval appreciated; d/w therapist  recommended Acute rehab; also recommend OT  Discussed with outpt Cardiologist Dr. Dalton To yesterday over son phone at bedside and reviewed current presentation after Cath and new stroke and likely embolic; Dr. Love also send Cath report which was essentially non obstructive and has underlying 6 stents 20 yrs ago; Elevated troponin appear non specific and demand related to possible A. Fib with RVR given current Cath  Discussed with wife and  daughter at length again at bedside; likely benefit form Acute Rehab especially critical recovery expected first 2 weeks as well as will get OT; in agreeemt  Discussed with Case Management team again today; send referral for Acute rehab placement. f/u family regarding choices  Discussed with RN

## 2025-05-17 NOTE — SWALLOW BEDSIDE ASSESSMENT ADULT - SLP PERTINENT HISTORY OF CURRENT PROBLEM
Per chart review, patient is a 80yM with PMH CAD s/p 6 stents, s/p CABG 20 yrs ago on ASA could not tolerate Plavix,  HTN HLD DM Dementia presenting with left facial droop brought in by family after returning from Hospital home took a shower and wife noticed facial asymmetry . Patient admitted with suspected acute CVA with left sided weakness and facial droop and slurred speech.

## 2025-05-17 NOTE — SWALLOW BEDSIDE ASSESSMENT ADULT - SWALLOW EVAL: DIAGNOSIS
Patient presents with s/s of dysphagia c/b poor oral acceptance, reduced bolus control resulting in anterior spillage, reduced labial seal, lingual and lateral sulci residue post swallow with no attempts to clear (required cues for multiple liquid washes to clear) and cough x1 post swallow. Patient consumed few bites and ~ 1 oz of liquids before declining further participation and becoming increasingly frustrated.

## 2025-05-17 NOTE — SWALLOW BEDSIDE ASSESSMENT ADULT - SWALLOW EVAL: BUCCAL STRENGTH AND MOBILITY
intact Alternatives Discussed Intro (Do Not Add Period): I discussed alternative treatments to Mohs surgery and specifically discussed the risks and benefits of

## 2025-05-17 NOTE — SWALLOW BEDSIDE ASSESSMENT ADULT - COMMENTS
Pt awake, alert, oriented x2 , responsive to all queries. Patient seated upright in chair upon entry, wife at bedside. Patient provided explanation re: reasoning for assessment and initially agreeable, however as session progressed patient with increased agitation and reduced willingness to participate. Oral mechanism exam notable for reduced range of motion and coordination with deficits noted on left side. Patient with impaired labial retraction and protrusion, lingual deviation upon protrusion. Patients' speech also appears impacted by oral motor deficits, impacting overall speech intelligibility. Speech difficult to understand however, patients' wife expressed speech is somewhat better but low in volume.    MRI IMPRESSION: Multiple acute infarcts as described largest involving the right motor strip. Exclude embolic disease.  CT Brain Perfusion: multiple old infarcts along with generalized small vessel ischemic changes. There infarcts in both MCA territories more extensive on the right. 2. The perfusion study suggests an area of penumbra surrounding the right   frontal MCA infarct, raising the possibility of new ischemia. Also there   are areas of hypoperfusion in the left temporal lobe as well as bilateral   frontal white matter. 3. In the neck, there is narrowing of both carotid bifurcations but calcified plaque, more extensive on the left.

## 2025-05-17 NOTE — SWALLOW BEDSIDE ASSESSMENT ADULT - PHARYNGEAL PHASE
cough x1/Delayed pharyngeal swallow/Multiple swallows No overt s/s of aspiration/Delayed pharyngeal swallow/Multiple swallows

## 2025-05-17 NOTE — SWALLOW BEDSIDE ASSESSMENT ADULT - ORAL PREPARATORY PHASE
Refuses to accept bolus into oral cavity/Anterior loss of bolus Refuses to accept bolus into oral cavity/Reduced oral grading/Decreased mastication ability

## 2025-05-17 NOTE — PROGRESS NOTE ADULT - SUBJECTIVE AND OBJECTIVE BOX
MR#[id number]  PATIENT NAME:-TIAGO EAGLE    DATE OF SERVICE: 05-17-25 @ 09:59  Patient was seen and examined by Alfredo Harrison MD on    05-17-25 @ 09:59 .  Interim events noted.Consultant notes ,Labs,Telemetry reviewed by me       HOSPITAL COURSE: HPI:  80yM from home, ambulates independently with PMH CAD s/p 6 stents, HTN HLD DM Dementia (AAOx2 baseline) urinary incontinence and depression presents with new onset L sided partial facial droop and slurred speech. Pt endorsed SOB on exertion 3 days ago to his cardiologist, recommended a TTE which apparently showed EF of 38%. Was admitted to [facility] 2 days ago for a colonoscopy, family at bedside is unsure of the reason for it. Was NPO and did not complete bowel prep. Was brought home around 2PM, and had an episode of urinary incontinence. At 2:30 after the patient showered, the wife noticed facial droop, slurring of his words, and drooling from the left side of his mouth. On arrival to the ED, code stroke was called, NIHSS by ED attending was 2. On examination by the admitting team, NIHSS was 5. Denies burning urination, chest pain, nausea and vomiting.    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 There infarcts in both MCA territories more extensive on the right.  - The perfusion study suggests an area of penumbra surrounding the right   frontal MCA infarct, raising the possibility of new ischemia. Also there   are areas of hypoperfusion in the left temporal lobe as well as bilateral   frontal white matter.  - Approximately 50-60% narrowing suggested in the proximal left internal carotid. Approximately 30% narrowing suggested of the right internal carotid.  s/p ASA and atorvastatin  s/p bolus NS 1L (15 May 2025 19:47)      INTERIM EVENTS:Patient seen at bedside ,interim events noted.      PMH -reviewed admission note, no change since admission  HEART FAILURE: Acute[ ]Chronic[ ] Systolic[ ] Diastolic[ ] Combined Systolic and Diastolic[ ]  CAD[ ] CABG[ ] PCI[ ]  DEVICES[ ] PPM[ ] ICD[ ] ILR[ ]  ATRIAL FIBRILLATION[ ] Paroxysmal[ ] Permanent[ ] CHADS2-[  ]  MICHAEL[ ] CKD1[ ] CKD2[ ] CKD3[ ] CKD4[ ] ESRD[ ]  COPD[ ] HTN[ ]   DM[ ] Type1[ ] Type 2[ ]   CVA[ ] Paresis[ ]    AMBULATION: Assisted[ ] Cane/walker[ ] Independent[ ]    MEDICATIONS  (STANDING):  apixaban 5 milliGRAM(s) Oral every 12 hours  aspirin enteric coated 81 milliGRAM(s) Oral daily  atorvastatin 80 milliGRAM(s) Oral at bedtime  dextrose 50% Injectable 25 Gram(s) IV Push once  donepezil 5 milliGRAM(s) Oral at bedtime  escitalopram 5 milliGRAM(s) Oral daily  haloperidol    Injectable 2 milliGRAM(s) IntraMuscular once  insulin lispro (ADMELOG) corrective regimen sliding scale   SubCutaneous three times a day before meals  insulin lispro (ADMELOG) corrective regimen sliding scale   SubCutaneous at bedtime  metoprolol succinate ER 12.5 milliGRAM(s) Oral daily  oxybutynin XL 10 milliGRAM(s) Oral at bedtime  pantoprazole    Tablet 40 milliGRAM(s) Oral before breakfast    MEDICATIONS  (PRN):            REVIEW OF SYSTEMS:  Constitutional: [ ] fever, [ ]weight loss,  [ ]fatigue [ ]weight gain  Eyes: [ ] visual changes  Respiratory: [ ]shortness of breath;  [ ] cough, [ ]wheezing, [ ]chills, [ ]hemoptysis  Cardiovascular: [ ] chest pain, [ ]palpitations, [ ]dizziness,  [ ]leg swelling[ ]orthopnea[ ]PND  Gastrointestinal: [ ] abdominal pain, [ ]nausea, [ ]vomiting,  [ ]diarrhea [ ]Constipation [ ]Melena  Genitourinary: [ ] dysuria, [ ] hematuria [ ]Freeman  Neurologic: [ ] headaches [ ] tremors[ ]weakness [ ]Paralysis Right[ ] Left[ ]  Skin: [ ] itching, [ ]burning, [ ] rashes  Endocrine: [ ] heat or cold intolerance  Musculoskeletal: [ ] joint pain or swelling; [ ] muscle, back, or extremity pain  Psychiatric: [ ] depression, [ ]anxiety, [ ]mood swings, or [ ]difficulty sleeping  Hematologic: [ ] easy bruising, [ ] bleeding gums    [ ] All remaining systems negative except as per above.   [ ]Unable to obtain.  [x] No change in ROS since admission      Vital Signs Last 24 Hrs  T(C): 36.6 (17 May 2025 17:33), Max: 36.6 (17 May 2025 17:33)  T(F): 97.8 (17 May 2025 17:33), Max: 97.8 (17 May 2025 17:33)  HR: 78 (17 May 2025 17:33) (66 - 78)  BP: 104/65 (17 May 2025 17:33) (102/69 - 130/81)  BP(mean): 71 (17 May 2025 11:03) (71 - 93)  RR: 18 (17 May 2025 17:33) (18 - 18)  SpO2: 96% (17 May 2025 17:33) (95% - 96%)    Parameters below as of 17 May 2025 17:33  Patient On (Oxygen Delivery Method): room air      I&O's Summary    17 May 2025 07:01  -  17 May 2025 20:59  --------------------------------------------------------  IN: 0 mL / OUT: 2 mL / NET: -2 mL        PHYSICAL EXAM:  General: No acute distress BMI-  HEENT: EOMI, PERRL  Neck: Supple, [ ] JVD  Lungs: Equal air entry bilaterally; [ ] rales [ ] wheezing [ ] rhonchi  Heart: Regular rate and rhythm; [x ] murmur   2/6 [ x] systolic [ ] diastolic [ ] radiation[ ] rubs [ ]  gallops  Abdomen: Nontender, bowel sounds present  Extremities: No clubbing, cyanosis, [ ] edema [ ]Pulses  equal and intact  Nervous system:  Alert & Oriented X3, no focal deficits  Psychiatric: Normal affect  Skin: No rashes or lesions    LABS:  05-17    133[L]  |  105  |  16  ----------------------------<  142[H]  3.9   |  23  |  1.03    Ca    9.3      17 May 2025 07:14  Phos  3.0     05-17  Mg     1.6     05-17    TPro  6.8  /  Alb  3.0[L]  /  TBili  0.7  /  DBili  x   /  AST  24  /  ALT  11  /  AlkPhos  49  05-17    Creatinine Trend: 1.03<--, 1.02<--, 1.27<--                        15.3   5.70  )-----------( 180      ( 17 May 2025 07:14 )             43.8

## 2025-05-18 LAB
ALBUMIN SERPL ELPH-MCNC: 3 G/DL — LOW (ref 3.5–5)
ALP SERPL-CCNC: 45 U/L — SIGNIFICANT CHANGE UP (ref 40–120)
ALT FLD-CCNC: 12 U/L DA — SIGNIFICANT CHANGE UP (ref 10–60)
ANION GAP SERPL CALC-SCNC: 9 MMOL/L — SIGNIFICANT CHANGE UP (ref 5–17)
AST SERPL-CCNC: 14 U/L — SIGNIFICANT CHANGE UP (ref 10–40)
BASOPHILS # BLD AUTO: 0.02 K/UL — SIGNIFICANT CHANGE UP (ref 0–0.2)
BASOPHILS NFR BLD AUTO: 0.4 % — SIGNIFICANT CHANGE UP (ref 0–2)
BILIRUB SERPL-MCNC: 0.5 MG/DL — SIGNIFICANT CHANGE UP (ref 0.2–1.2)
BUN SERPL-MCNC: 18 MG/DL — SIGNIFICANT CHANGE UP (ref 7–18)
CALCIUM SERPL-MCNC: 8.9 MG/DL — SIGNIFICANT CHANGE UP (ref 8.4–10.5)
CHLORIDE SERPL-SCNC: 102 MMOL/L — SIGNIFICANT CHANGE UP (ref 96–108)
CO2 SERPL-SCNC: 22 MMOL/L — SIGNIFICANT CHANGE UP (ref 22–31)
CREAT SERPL-MCNC: 1.14 MG/DL — SIGNIFICANT CHANGE UP (ref 0.5–1.3)
EGFR: 66 ML/MIN/1.73M2 — SIGNIFICANT CHANGE UP
EGFR: 66 ML/MIN/1.73M2 — SIGNIFICANT CHANGE UP
EOSINOPHIL # BLD AUTO: 0.23 K/UL — SIGNIFICANT CHANGE UP (ref 0–0.5)
EOSINOPHIL NFR BLD AUTO: 4.4 % — SIGNIFICANT CHANGE UP (ref 0–6)
GLUCOSE BLDC GLUCOMTR-MCNC: 149 MG/DL — HIGH (ref 70–99)
GLUCOSE BLDC GLUCOMTR-MCNC: 150 MG/DL — HIGH (ref 70–99)
GLUCOSE BLDC GLUCOMTR-MCNC: 190 MG/DL — HIGH (ref 70–99)
GLUCOSE BLDC GLUCOMTR-MCNC: 219 MG/DL — HIGH (ref 70–99)
GLUCOSE SERPL-MCNC: 209 MG/DL — HIGH (ref 70–99)
HCT VFR BLD CALC: 42.9 % — SIGNIFICANT CHANGE UP (ref 39–50)
HGB BLD-MCNC: 14.8 G/DL — SIGNIFICANT CHANGE UP (ref 13–17)
IMM GRANULOCYTES NFR BLD AUTO: 0.2 % — SIGNIFICANT CHANGE UP (ref 0–0.9)
LYMPHOCYTES # BLD AUTO: 1.27 K/UL — SIGNIFICANT CHANGE UP (ref 1–3.3)
LYMPHOCYTES # BLD AUTO: 24.5 % — SIGNIFICANT CHANGE UP (ref 13–44)
MAGNESIUM SERPL-MCNC: 1.7 MG/DL — SIGNIFICANT CHANGE UP (ref 1.6–2.6)
MCHC RBC-ENTMCNC: 28.9 PG — SIGNIFICANT CHANGE UP (ref 27–34)
MCHC RBC-ENTMCNC: 34.5 G/DL — SIGNIFICANT CHANGE UP (ref 32–36)
MCV RBC AUTO: 83.8 FL — SIGNIFICANT CHANGE UP (ref 80–100)
MONOCYTES # BLD AUTO: 0.53 K/UL — SIGNIFICANT CHANGE UP (ref 0–0.9)
MONOCYTES NFR BLD AUTO: 10.2 % — SIGNIFICANT CHANGE UP (ref 2–14)
NEUTROPHILS # BLD AUTO: 3.13 K/UL — SIGNIFICANT CHANGE UP (ref 1.8–7.4)
NEUTROPHILS NFR BLD AUTO: 60.3 % — SIGNIFICANT CHANGE UP (ref 43–77)
NRBC BLD AUTO-RTO: 0 /100 WBCS — SIGNIFICANT CHANGE UP (ref 0–0)
PHOSPHATE SERPL-MCNC: 2.8 MG/DL — SIGNIFICANT CHANGE UP (ref 2.5–4.5)
PLATELET # BLD AUTO: 177 K/UL — SIGNIFICANT CHANGE UP (ref 150–400)
POTASSIUM SERPL-MCNC: 3.9 MMOL/L — SIGNIFICANT CHANGE UP (ref 3.5–5.3)
POTASSIUM SERPL-SCNC: 3.9 MMOL/L — SIGNIFICANT CHANGE UP (ref 3.5–5.3)
PROT SERPL-MCNC: 6.6 G/DL — SIGNIFICANT CHANGE UP (ref 6–8.3)
RBC # BLD: 5.12 M/UL — SIGNIFICANT CHANGE UP (ref 4.2–5.8)
RBC # FLD: 13.2 % — SIGNIFICANT CHANGE UP (ref 10.3–14.5)
SODIUM SERPL-SCNC: 133 MMOL/L — LOW (ref 135–145)
WBC # BLD: 5.19 K/UL — SIGNIFICANT CHANGE UP (ref 3.8–10.5)
WBC # FLD AUTO: 5.19 K/UL — SIGNIFICANT CHANGE UP (ref 3.8–10.5)

## 2025-05-18 PROCEDURE — 99233 SBSQ HOSP IP/OBS HIGH 50: CPT | Mod: GC

## 2025-05-18 RX ORDER — METOPROLOL SUCCINATE 50 MG/1
12.5 TABLET, EXTENDED RELEASE ORAL DAILY
Refills: 0 | Status: DISCONTINUED | OUTPATIENT
Start: 2025-05-19 | End: 2025-05-20

## 2025-05-18 RX ADMIN — ESCITALOPRAM OXALATE 5 MILLIGRAM(S): 20 TABLET ORAL at 12:51

## 2025-05-18 RX ADMIN — INSULIN LISPRO 2: 100 INJECTION, SOLUTION INTRAVENOUS; SUBCUTANEOUS at 12:52

## 2025-05-18 RX ADMIN — Medication 40 MILLIGRAM(S): at 06:51

## 2025-05-18 RX ADMIN — APIXABAN 5 MILLIGRAM(S): 2.5 TABLET, FILM COATED ORAL at 17:29

## 2025-05-18 RX ADMIN — APIXABAN 5 MILLIGRAM(S): 2.5 TABLET, FILM COATED ORAL at 06:50

## 2025-05-18 RX ADMIN — INSULIN LISPRO 1: 100 INJECTION, SOLUTION INTRAVENOUS; SUBCUTANEOUS at 09:01

## 2025-05-18 RX ADMIN — Medication 81 MILLIGRAM(S): at 12:51

## 2025-05-18 NOTE — PROGRESS NOTE ADULT - ASSESSMENT
80yM from home, ambulates independently with PMH CAD s/p 6 stents, HTN HLD DM Dementia (AAOx2 baseline) urinary incontinence and depression presents with new onset L sided partial facial droop and slurred speech. Admitted to telemetry for stroke and new onset AFib.    # Stroke.   # New onset atrial fibrillation.   # HTN  #HLD  F/u MRI  neuro f/u   - C/w on FD lovenox  - Started on Metoprolol 12.5 BID for rate control  - Tele monitoring (Goal rate <110)  - f/u TTE  -  atorvastatin 80mg  - tele Wound care order audit completed

## 2025-05-18 NOTE — PROGRESS NOTE ADULT - SUBJECTIVE AND OBJECTIVE BOX
MR#[id number]  PATIENT NAME:SHABANA EAGLE    DATE OF SERVICE: 05-18-25 @ 0938  Patient was seen and examined by Alfredo Harrison MD on    05-18-25 @ 09:38 .  Interim events noted.Consultant notes ,Labs,Telemetry reviewed by me       HOSPITAL COURSE: HPI:  80yM from home, ambulates independently with PMH CAD s/p 6 stents, HTN HLD DM Dementia (AAOx2 baseline) urinary incontinence and depression presents with new onset L sided partial facial droop and slurred speech. Pt endorsed SOB on exertion 3 days ago to his cardiologist, recommended a TTE which apparently showed EF of 38%. Was admitted to [facility] 2 days ago for a colonoscopy, family at bedside is unsure of the reason for it. Was NPO and did not complete bowel prep. Was brought home around 2PM, and had an episode of urinary incontinence. At 2:30 after the patient showered, the wife noticed facial droop, slurring of his words, and drooling from the left side of his mouth. On arrival to the ED, code stroke was called, NIHSS by ED attending was 2. On examination by the admitting team, NIHSS was 5. Denies burning urination, chest pain, nausea and vomiting.    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 There infarcts in both MCA territories more extensive on the right.  - The perfusion study suggests an area of penumbra surrounding the right   frontal MCA infarct, raising the possibility of new ischemia. Also there   are areas of hypoperfusion in the left temporal lobe as well as bilateral   frontal white matter.  - Approximately 50-60% narrowing suggested in the proximal left internal carotid. Approximately 30% narrowing suggested of the right internal carotid.  s/p ASA and atorvastatin  s/p bolus NS 1L (15 May 2025 19:47)      INTERIM EVENTS:Patient seen at bedside ,interim events noted.      PMH -reviewed admission note, no change since admission  HEART FAILURE: Acute[ ]Chronic[ ] Systolic[ ] Diastolic[ ] Combined Systolic and Diastolic[ ]  CAD[ ] CABG[ ] PCI[ ]  DEVICES[ ] PPM[ ] ICD[ ] ILR[ ]  ATRIAL FIBRILLATION[ ] Paroxysmal[ ] Permanent[ ] CHADS2-[  ]  MICHAEL[ ] CKD1[ ] CKD2[ ] CKD3[ ] CKD4[ ] ESRD[ ]  COPD[ ] HTN[ ]   DM[ ] Type1[ ] Type 2[ ]   CVA[ ] Paresis[ ]    AMBULATION: Assisted[ ] Cane/walker[ ] Independent[ ]    MEDICATIONS  (STANDING):  apixaban 5 milliGRAM(s) Oral every 12 hours  aspirin enteric coated 81 milliGRAM(s) Oral daily  atorvastatin 80 milliGRAM(s) Oral at bedtime  dextrose 50% Injectable 25 Gram(s) IV Push once  donepezil 5 milliGRAM(s) Oral at bedtime  escitalopram 5 milliGRAM(s) Oral daily  insulin lispro (ADMELOG) corrective regimen sliding scale   SubCutaneous three times a day before meals  insulin lispro (ADMELOG) corrective regimen sliding scale   SubCutaneous at bedtime  oxybutynin XL 10 milliGRAM(s) Oral at bedtime  pantoprazole    Tablet 40 milliGRAM(s) Oral before breakfast    MEDICATIONS  (PRN):  haloperidol    Injectable 1 milliGRAM(s) IntraMuscular once PRN Severe agitation.            REVIEW OF SYSTEMS:  Constitutional: [ ] fever, [ ]weight loss,  [ ]fatigue [ ]weight gain  Eyes: [ ] visual changes  Respiratory: [ ]shortness of breath;  [ ] cough, [ ]wheezing, [ ]chills, [ ]hemoptysis  Cardiovascular: [ ] chest pain, [ ]palpitations, [ ]dizziness,  [ ]leg swelling[ ]orthopnea[ ]PND  Gastrointestinal: [ ] abdominal pain, [ ]nausea, [ ]vomiting,  [ ]diarrhea [ ]Constipation [ ]Melena  Genitourinary: [ ] dysuria, [ ] hematuria [ ]Freeman  Neurologic: [ ] headaches [ ] tremors[ ]weakness [ ]Paralysis Right[ ] Left[ ]  Skin: [ ] itching, [ ]burning, [ ] rashes  Endocrine: [ ] heat or cold intolerance  Musculoskeletal: [ ] joint pain or swelling; [ ] muscle, back, or extremity pain  Psychiatric: [ ] depression, [ ]anxiety, [ ]mood swings, or [ ]difficulty sleeping  Hematologic: [ ] easy bruising, [ ] bleeding gums    [ ] All remaining systems negative except as per above.   [ ]Unable to obtain.  [x] No change in ROS since admission      Vital Signs Last 24 Hrs  T(C): 36.3 (18 May 2025 19:46), Max: 36.6 (17 May 2025 23:54)  T(F): 97.3 (18 May 2025 19:46), Max: 97.9 (18 May 2025 15:42)  HR: 59 (18 May 2025 19:46) (52 - 89)  BP: 131/80 (18 May 2025 19:46) (97/72 - 131/80)  BP(mean): 94 (18 May 2025 19:46) (94 - 94)  RR: 18 (18 May 2025 19:46) (17 - 18)  SpO2: 93% (18 May 2025 19:46) (93% - 99%)    Parameters below as of 18 May 2025 19:46  Patient On (Oxygen Delivery Method): room air      I&O's Summary    17 May 2025 07:01  -  18 May 2025 07:00  --------------------------------------------------------  IN: 0 mL / OUT: 2 mL / NET: -2 mL        PHYSICAL EXAM:  General: No acute distress BMI-  HEENT: EOMI, PERRL  Neck: Supple, [ ] JVD  Lungs: Equal air entry bilaterally; [ ] rales [ ] wheezing [ ] rhonchi  Heart: Regular rate and rhythm; [x ] murmur   2/6 [ x] systolic [ ] diastolic [ ] radiation[ ] rubs [ ]  gallops  Abdomen: Nontender, bowel sounds present  Extremities: No clubbing, cyanosis, [ ] edema [ ]Pulses  equal and intact  Nervous system:  Alert & Oriented X3, no focal deficits  Psychiatric: Normal affect  Skin: No rashes or lesions    LABS:  05-18    133[L]  |  102  |  18  ----------------------------<  209[H]  3.9   |  22  |  1.14    Ca    8.9      18 May 2025 06:55  Phos  2.8     05-18  Mg     1.7     05-18    TPro  6.6  /  Alb  3.0[L]  /  TBili  0.5  /  DBili  x   /  AST  14  /  ALT  12  /  AlkPhos  45  05-18    Creatinine Trend: 1.14<--, 1.03<--, 1.02<--, 1.27<--                        14.8   5.19  )-----------( 177      ( 18 May 2025 06:55 )             42.9

## 2025-05-18 NOTE — PROGRESS NOTE ADULT - ATTENDING COMMENTS
Patient seen and examined this morning around 9.45 AM; later met with family at bedside (Son, daughter and wife)    80yM with PMH CAD s/p 6 stents, s/p CABG 20 yrs ago on ASA could not tolerate Plavix,  HTN HLD DM Dementia presenting with left facial droop brought in by family after returning from Hospital home took a shower and wife noticed facial asymmetry   s/p recent Coronary Angio at [facility]  day prior to admission without obstructive disease planned for a Colonoscopy  for unclear reasons ??IBD eval but patient refused and patient sent home today; H/H stable; no bleeding reported;   Patient admitted with suspected acute CVA with left sided weakness and facial droop and speech disability. able to eat.   Later this morning was agitated at RN and anders Pak was called; given Haldol with calmer mood and noted to be sleeping    Vital Signs Last 24 Hrs  T(C): 36.6 (18 May 2025 15:42), Max: 36.6 (17 May 2025 17:33)  T(F): 97.9 (18 May 2025 15:42), Max: 97.9 (18 May 2025 15:42)  HR: 74 (18 May 2025 15:42) (52 - 89)  BP: 97/72 (18 May 2025 15:42) (97/72 - 129/95)  RR: 18 (18 May 2025 15:42) (17 - 18)  SpO2: 94% (18 May 2025 15:42) (93% - 99%): room air      P/E:  Psych: AAO x2  Neuro: B/L UE 5/5; LLE ?4-5/5; RLE 5/5; left facial droop and slurred speech  CVS: S1S2 present, regular, no edema  Resp: BLAE+, No wheeze or Rhonchi  GI: Soft, BS+, Non tender, non distended  Extr: No  calf tenderness B/L Lower extremities  Skin: Warm and moist without any rashes    Labs:                              14.8   5.19  )-----------( 177      ( 18 May 2025 06:55 )             42.9     05-18    133[L]  |  102  |  18  ----------------------------<  209[H]  3.9   |  22  |  1.14  Ca    8.9      18 May 2025 06:55  Phos  2.8     05-18  Mg     1.7     05-18  TPro  6.6  /  Alb  3.0[L]  /  TBili  0.5  /  DBili  x   /  AST  14  /  ALT  12  /  AlkPhos  45  05-18      A1C with Estimated Average Glucose (05.15.25 @ 20:10) A1C with Estimated Average Glucose Result: 8.4  Lipid Profile (05.16.25 @ 06:40)   Cholesterol: 204 mg/dL; Triglycerides, Serum: 204 mg/dL; HDL Cholesterol: 33 mg/dL; Non HDL Cholesterol: 171: Direct LDL: 45 mg/dL    EKG: A.Fib; no acute ST, T changes; Repeat EKG today: Sinus rhythm, rate controlled Patient seen and examined this morning around 9.45 AM; later met with family at bedside (Son, daughter and wife)    80yM with PMH CAD s/p 6 stents, s/p CABG 20 yrs ago on ASA could not tolerate Plavix,  HTN HLD DM Dementia presenting with left facial droop brought in by family after returning from Hospital home took a shower and wife noticed facial asymmetry   s/p recent [facility] Ang[facility] at [facility]  day prior to admission without obstructive disease planned for a Colonoscopy  for unclear reasons ??IBD eval but patient refused and patient sent home today; H/H stable; no bleeding reported;   Patient admitted with suspected acute CVA with left sided weakness and facial droop and speech disability. able to eat.   Later this morning was agitated at RN and anders Pak was called; given Haldol with calmer mood and noted to be sleeping    Vital Signs Last 24 Hrs  T(C): 36.6 (18 May 2025 15:42), Max: 36.6 (17 May 2025 17:33)  T(F): 97.9 (18 May 2025 15:42), Max: 97.9 (18 May 2025 15:42)  HR: 74 (18 May 2025 15:42) (52 - 89)  BP: 97/72 (18 May 2025 15:42) (97/72 - 129/95)  RR: 18 (18 May 2025 15:42) (17 - 18)  SpO2: 94% (18 May 2025 15:42) (93% - 99%): room air    P/E:  Psych: AAO x2  Neuro: B/L UE 5/5; LLE ?4-5/5; RLE 5/5; left facial droop and slurred speech  CVS: S1S2 present, regular, no edema  Resp: BLAE+, No wheeze or Rhonchi  GI: Soft, BS+, Non tender, non distended  Extr: No  calf tenderness B/L Lower extremities  Skin: Warm and moist without any rashes    Labs:                              14.8   5.19  )-----------( 177      ( 18 May 2025 06:55 )             42.9     05-18    133[L]  |  102  |  18  ----------------------------<  209[H]  3.9   |  22  |  1.14  Ca    8.9      18 May 2025 06:55  Phos  2.8     05-18  Mg     1.7     05-18  TPro  6.6  /  Alb  3.0[L]  /  TBili  0.5  /  DBili  x   /  AST  14  /  ALT  12  /  AlkPhos  45  05-18      A1C with Estimated Average Glucose (05.15.25 @ 20:10) A1C with Estimated Average Glucose Result: 8.4  Lipid Profile (05.16.25 @ 06:40)   Cholesterol: 204 mg/dL; Triglycerides, Serum: 204 mg/dL; HDL Cholesterol: 33 mg/dL; Non HDL Cholesterol: 171: Direct LDL: 45 mg/dL    A/P:  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 Fib  New onset A. Fib suspect ppt by volume depletion s/p bowel prep converted to Sinus rhythm (PAF)  Tachycardia due to intravascular volume depletion now rate controlled  New onset LV dysfunction/ Systolic HF (undiagnosed HF)  Elevated Troponin demand ischemia vs HTN   Hx Type 2 DM/ HTN/ Mild dementia  Hx Major depression/ Urinary incontinence    Plan:  Patient s/p Code stroke in ED; not a candidate for tpA as per Stroke team as reported by  ED physician  Continue Ecotrin and high dose Statin Lipitor 80 mg HS (home dose)  Continue Tele; 2D Echo noted LVEF 35 to 40%  Neuro consult appreciated; d/w Dr. Doshi; may use anti HTN judiciously as there is evidence of Carotid stenosis on CT Angio  MRI with multiple  strokes involving multiple areas c/w  embolic CVA given underlying newly found A. Fib as well as CT findings of old and new stroke;  Out of window for permissive HTN; will give low dose Metoprolol ER 12.5 mg provided SBP >110 mm Hg  Patient at some point will also benefit from ARB added given LV dysfunction  Lovenox 1mg/kg q 12 hrs for now; Checked  eligibility for DOACs; $50 copay; transition to Apixaban 5mg BID   Cardio Consult appreciated d/w Dr. Harrison; initially planned Digoxin lading but converted to sinus; will hold off Digoxin  Hold oral hypoglycemics; Accu-Chek with Insulin scale; A1c slightly elevated; Lipid panel noted; Ct statin high dose 80 mg HS  Resume oral hypoglycemic as patient is on a diet  Speech and swallow eval appreciated;  soft and bite sized food; thin liquids okay  PT eval appreciated; d/w therapist  recommended Acute rehab; also recommend OT  Discussed with outpt Cardiologist Dr. Dalton To yesterday over son phone at bedside and reviewed current presentation after Cath and new stroke and likely embolic; Dr. Love also send Cath report which was essentially non obstructive and has underlying 6 stents 20 yrs ago; Elevated troponin appear non specific and demand related to possible A. Fib with RVR given current Cath  Discussed with wife and  daughter at length again at bedside; likely benefit form Acute Rehab especially critical recovery expected first 2 weeks as well as will get OT; in agreeemt  Discussed with Case Management team again today; send referral for Acute rehab placement. f/u family regarding choices  Discussed with Wife and daughter; apply for [facility] in [place] Patient seen and examined this morning; later spoke with wife and Daughter at Nursing station    80yM with PMH CAD s/p 6 stents, s/p CABG 20 yrs ago on ASA could not tolerate Plavix,  HTN HLD DM Dementia presenting with left facial droop brought in by family after returning from Hospital home took a shower and wife noticed facial asymmetry; s/p recent [facility] Ang[facility] at [facility]  day prior to admission without obstructive disease planned for a Colonoscopy  for unclear reasons ??IBD eval but patient refused and patient sent home and tooka shower and then wife noticed symptoms and brought to ED;     Patient admitted with suspected acute CVA with left sided weakness and facial droop and speech disability. able to eat minced and soft diet.   MRI with multiple acute strokes in the setting of A. Fib; Placed on a/c transitioned to oral DOACs; sen by PT recommended acute rehab;     Vital Signs Last 24 Hrs  T(C): 36.6 (18 May 2025 15:42), Max: 36.6 (17 May 2025 17:33)  T(F): 97.9 (18 May 2025 15:42), Max: 97.9 (18 May 2025 15:42)  HR: 74 (18 May 2025 15:42) (52 - 89)  BP: 97/72 (18 May 2025 15:42) (97/72 - 129/95)  RR: 18 (18 May 2025 15:42) (17 - 18)  SpO2: 94% (18 May 2025 15:42) (93% - 99%): room air    P/E:  Psych: AAO x2.5 -3; mild cognitive impairment  Neuro: B/L UE 5/5; LLE ?4-5/5; RLE 5/5; left facial droop and slurred speech  CVS: S1S2 present, regular, no edema  Resp: BLAE+, No wheeze or Rhonchi  GI: Soft, BS+, Non tender, non distended  Extr: No  calf tenderness B/L Lower extremities  Skin: Warm and moist without any rashes    Labs:                            14.8   5.19  )-----------( 177      ( 18 May 2025 06:55 )             42.9     05-18  133[L]  |  102  |  18  ----------------------------<  209[H]  3.9   |  22  |  1.14  Ca    8.9      18 May 2025 06:55  Phos  2.8     05-18  Mg     1.7     05-18    TPro  6.6  /  Alb  3.0[L]  /  TBili  0.5  /  DBili  x   /  AST  14  /  ALT  12  /  AlkPhos  45  05-18    A1C with Estimated Average Glucose (05.15.25 @ 20:10) A1C with Estimated Average Glucose Result: 8.4  Lipid Profile (05.16.25 @ 06:40)   Cholesterol: 204 mg/dL; Triglycerides, Serum: 204 mg/dL; HDL Cholesterol: 33 mg/dL; Non HDL Cholesterol: 171: Direct LDL: 45 mg/dL    A/P:  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 Fib  New onset A. Fib suspect ppt by volume depletion s/p bowel prep converted to Sinus rhythm (PAF)  Tachycardia due to intravascular volume depletion now rate controlled  New onset LV dysfunction/ Systolic HF (undiagnosed HF)  Elevated Troponin demand ischemia vs HTN   Hx Type 2 DM/ HTN/ Mild dementia  Hx Major depression/ Urinary incontinence    Plan:  Patient s/p Code stroke in ED; not a candidate for tpA as per Stroke team as reported by  ED physician  Continue Ecotrin and high dose Statin Lipitor 80 mg HS (home dose)  Tele reviewed; HR controlled; Ronald to 50; 2D Echo noted LVEF 35 to 40%  Neuro consult appreciated; d/w Dr. Doshi; may use anti HTN judiciously as there is evidence of Carotid stenosis on CT Angio  MRI with multiple  strokes involving multiple areas c/w  embolic CVA given underlying newly found A. Fib as well as CT findings of old and new stroke;  Out of window for permissive HTN; will give low dose Metoprolol ER 12.5 mg provided SBP >110 mm Hg; held this morning  Patient at some point will also benefit from ARB added given LV dysfunction provided BP tolerated  Checked  eligibility for DOACs; $50 copay; transitioned to Apixaban 5mg BID   Cardio Consult and f/u appreciated d/w Dr. Harrison; initially planned Digoxin lading but converted to sinus; will hold off Digoxin  Hold oral hypoglycemics; Accu-Chek with Insulin scale; A1c slightly elevated; Lipid panel noted; Ct statin high dose 80 mg HS  Resume oral hypoglycemic as patient is on a diet  Speech and swallow eval appreciated;  soft and bite sized food; thin liquids okay  PT eval appreciated; d/w therapist  recommended Acute rehab; also recommend OT ordered  Discussed with outpt Cardiologist Dr. Dalton To Friday over son phone at bedside and reviewed current presentation after Cath and new stroke and likely embolic; Dr. Love also send Cath report which was essentially non obstructive and has underlying 6 stents 20 yrs ago; Elevated troponin appear non specific and demand related to possible A. Fib with RVR given current Cath  Discussed with wife and  daughter;  likely benefit form Acute Rehab especially critical recovery expected first 2 weeks as well as will get OT; in agreement  Discussed with Case Management team ([place]) again today; send referral for Acute rehab placement.   Discussed with Wife and daughter; apply for [facility]hab in [place] (not sure truly acute)  I will be away 5/19/25 onwards; Hospitalist Colleague to assume care AM

## 2025-05-18 NOTE — PROGRESS NOTE ADULT - SUBJECTIVE AND OBJECTIVE BOX
PGY-1 Progress Note discussed with attending    PLEASE MS TEAMS AUTHOR TILL 5:00 PM    PLEASE CONTACT ON CALL TEAM:  - On Call Team (Please refer to Dennis) FROM 5:00 PM - 8:30PM  - Nightfloat Team FROM 8:30 -7:30 AM      INTERVAL HPI/OVERNIGHT EVENTS: No acute events overnight.    SUBJECTIVE: Patient seen and examined at bedside this morning. ***INCOMPLETE***    ---------------------------    REVIEW OF SYSTEMS:  CONSTITUTIONAL: No fever, weight loss, fatigue  RESPIRATORY: No cough, wheezing, chills, hemoptysis, shortness of breath  CARDIOVASCULAR: No chest pain, palpitations, dizziness, leg swelling  GASTROINTESTINAL: No abdominal pain, nausea, vomiting, hematemesis, diarrhea, constipation, melena, hematochezia  GENITOURINARY: No dysuria, hematuria, urinary frequency  NEUROLOGICAL: No headaches, memory loss, loss of strength, numbness, tremors  SKIN: No itching, burning, rashes, lesions     MEDICATIONS  (STANDING):  apixaban 5 milliGRAM(s) Oral every 12 hours  aspirin enteric coated 81 milliGRAM(s) Oral daily  atorvastatin 80 milliGRAM(s) Oral at bedtime  dextrose 50% Injectable 25 Gram(s) IV Push once  donepezil 5 milliGRAM(s) Oral at bedtime  escitalopram 5 milliGRAM(s) Oral daily  insulin lispro (ADMELOG) corrective regimen sliding scale   SubCutaneous three times a day before meals  insulin lispro (ADMELOG) corrective regimen sliding scale   SubCutaneous at bedtime  metoprolol succinate ER 12.5 milliGRAM(s) Oral daily  oxybutynin XL 10 milliGRAM(s) Oral at bedtime  pantoprazole    Tablet 40 milliGRAM(s) Oral before breakfast    MEDICATIONS  (PRN):  haloperidol    Injectable 1 milliGRAM(s) IntraMuscular once PRN Severe agitation.      Vital Signs Last 24 Hrs  T(C): 36.4 (18 May 2025 07:14), Max: 36.6 (17 May 2025 17:33)  T(F): 97.5 (18 May 2025 07:14), Max: 97.8 (17 May 2025 17:33)  HR: 57 (18 May 2025 07:14) (52 - 89)  BP: 121/67 (18 May 2025 07:14) (102/69 - 122/79)  BP(mean): 71 (17 May 2025 11:03) (71 - 71)  RR: 17 (18 May 2025 07:14) (17 - 18)  SpO2: 94% (18 May 2025 07:14) (94% - 99%)    Parameters below as of 18 May 2025 07:14  Patient On (Oxygen Delivery Method): room air        -----------------------------    PHYSICAL EXAMINATION:  GENERAL: NAD, lying in bed  HEAD:  Atraumatic, Normocephalic  EYES:  conjunctiva and sclera clear  NECK: Supple, No JVD  CHEST/LUNG: Clear to auscultation bilaterally; No rales, rhonchi, wheezing, or rubs  HEART: Regular rate and rhythm; No murmurs, rubs, or gallops  ABDOMEN: Soft, Nontender, Nondistended; Bowel sounds present, no guarding  NERVOUS SYSTEM:  Alert & Oriented X3  : voiding well  EXTREMITIES:  2+ Peripheral Pulses, No clubbing, cyanosis, or edema  SKIN: warm dry                          14.8   5.19  )-----------( 177      ( 18 May 2025 06:55 )             42.9     05-18    133[L]  |  102  |  18  ----------------------------<  209[H]  3.9   |  22  |  1.14    Ca    8.9      18 May 2025 06:55  Phos  2.8     05-18  Mg     1.7     05-18    TPro  6.6  /  Alb  3.0[L]  /  TBili  0.5  /  DBili  x   /  AST  14  /  ALT  12  /  AlkPhos  45  05-18    LIVER FUNCTIONS - ( 18 May 2025 06:55 )  Alb: 3.0 g/dL / Pro: 6.6 g/dL / ALK PHOS: 45 U/L / ALT: 12 U/L DA / AST: 14 U/L / GGT: x           CARDIAC MARKERS ( 16 May 2025 14:58 )  x     / x     / x     / x     / 16.7 ng/mL          I&O's Summary    17 May 2025 07:01  -  18 May 2025 07:00  --------------------------------------------------------  IN: 0 mL / OUT: 2 mL / NET: -2 mL            CAPILLARY BLOOD GLUCOSE      RADIOLOGY & ADDITIONAL TESTS:                   PGY-1 Progress Note discussed with attending    PLEASE MS TEAMS AUTHOR TILL 5:00 PM    PLEASE CONTACT ON CALL TEAM:  - On Call Team (Please refer to Dennis) FROM 5:00 PM - 8:30PM  - Nightfloat Team FROM 8:30 -7:30 AM      INTERVAL HPI/OVERNIGHT EVENTS: No acute events overnight.    SUBJECTIVE: Patient seen and examined at bedside this morning. AAOx2. Says that he feels better.    ---------------------------    REVIEW OF SYSTEMS:  CONSTITUTIONAL: No fever, weight loss, fatigue  RESPIRATORY: No cough, wheezing, chills, hemoptysis, shortness of breath  CARDIOVASCULAR: No chest pain, palpitations, dizziness, leg swelling  GASTROINTESTINAL: No abdominal pain, nausea, vomiting, hematemesis, diarrhea, constipation, melena, hematochezia  GENITOURINARY: No dysuria, hematuria, urinary frequency  NEUROLOGICAL: No headaches, memory loss, loss of strength, numbness, tremors  SKIN: No itching, burning, rashes, lesions     MEDICATIONS  (STANDING):  apixaban 5 milliGRAM(s) Oral every 12 hours  aspirin enteric coated 81 milliGRAM(s) Oral daily  atorvastatin 80 milliGRAM(s) Oral at bedtime  dextrose 50% Injectable 25 Gram(s) IV Push once  donepezil 5 milliGRAM(s) Oral at bedtime  escitalopram 5 milliGRAM(s) Oral daily  insulin lispro (ADMELOG) corrective regimen sliding scale   SubCutaneous three times a day before meals  insulin lispro (ADMELOG) corrective regimen sliding scale   SubCutaneous at bedtime  metoprolol succinate ER 12.5 milliGRAM(s) Oral daily  oxybutynin XL 10 milliGRAM(s) Oral at bedtime  pantoprazole    Tablet 40 milliGRAM(s) Oral before breakfast    MEDICATIONS  (PRN):  haloperidol    Injectable 1 milliGRAM(s) IntraMuscular once PRN Severe agitation.      Vital Signs Last 24 Hrs  T(C): 36.4 (18 May 2025 07:14), Max: 36.6 (17 May 2025 17:33)  T(F): 97.5 (18 May 2025 07:14), Max: 97.8 (17 May 2025 17:33)  HR: 57 (18 May 2025 07:14) (52 - 89)  BP: 121/67 (18 May 2025 07:14) (102/69 - 122/79)  BP(mean): 71 (17 May 2025 11:03) (71 - 71)  RR: 17 (18 May 2025 07:14) (17 - 18)  SpO2: 94% (18 May 2025 07:14) (94% - 99%)    Parameters below as of 18 May 2025 07:14  Patient On (Oxygen Delivery Method): room air        -----------------------------    PHYSICAL EXAMINATION:  GENERAL: NAD, elderly male lying in bed  HEAD:  Atraumatic, Normocephalic  EYES:  conjunctiva and sclera clear  NECK: Supple, No JVD  CHEST/LUNG: Clear to auscultation bilaterally; No rales, rhonchi, wheezing, or rubs  HEART: Regular rate and rhythm; No murmurs, rubs, or gallops  ABDOMEN: Soft, Nontender, Nondistended; Bowel sounds present, no guarding  NERVOUS SYSTEM:  Alert & Oriented X2. Dysarthria. +LUE drift. +LLE drift. Lower facial weakness on the left.   : voiding well  EXTREMITIES:  2+ Peripheral Pulses, No clubbing, cyanosis, or edema  SKIN: warm dry                          14.8   5.19  )-----------( 177      ( 18 May 2025 06:55 )             42.9     05-18    133[L]  |  102  |  18  ----------------------------<  209[H]  3.9   |  22  |  1.14    Ca    8.9      18 May 2025 06:55  Phos  2.8     05-18  Mg     1.7     05-18    TPro  6.6  /  Alb  3.0[L]  /  TBili  0.5  /  DBili  x   /  AST  14  /  ALT  12  /  AlkPhos  45  05-18    LIVER FUNCTIONS - ( 18 May 2025 06:55 )  Alb: 3.0 g/dL / Pro: 6.6 g/dL / ALK PHOS: 45 U/L / ALT: 12 U/L DA / AST: 14 U/L / GGT: x           CARDIAC MARKERS ( 16 May 2025 14:58 )  x     / x     / x     / x     / 16.7 ng/mL          I&O's Summary    17 May 2025 07:01  -  18 May 2025 07:00  --------------------------------------------------------  IN: 0 mL / OUT: 2 mL / NET: -2 mL            CAPILLARY BLOOD GLUCOSE      RADIOLOGY & ADDITIONAL TESTS:

## 2025-05-19 ENCOUNTER — TRANSCRIPTION ENCOUNTER (OUTPATIENT)
Age: 80
End: 2025-05-19

## 2025-05-19 LAB
GLUCOSE BLDC GLUCOMTR-MCNC: 147 MG/DL — HIGH (ref 70–99)
GLUCOSE BLDC GLUCOMTR-MCNC: 151 MG/DL — HIGH (ref 70–99)
GLUCOSE BLDC GLUCOMTR-MCNC: 162 MG/DL — HIGH (ref 70–99)
GLUCOSE BLDC GLUCOMTR-MCNC: 207 MG/DL — HIGH (ref 70–99)

## 2025-05-19 PROCEDURE — 99232 SBSQ HOSP IP/OBS MODERATE 35: CPT | Mod: GC

## 2025-05-19 RX ORDER — PIOGLITAZONE HYDROCHLORIDE 15 MG/1
1 TABLET ORAL
Refills: 0 | DISCHARGE

## 2025-05-19 RX ORDER — LISINOPRIL 5 MG/1
10 TABLET ORAL DAILY
Refills: 0 | Status: DISCONTINUED | OUTPATIENT
Start: 2025-05-19 | End: 2025-05-20

## 2025-05-19 RX ORDER — LOSARTAN POTASSIUM 100 MG/1
25 TABLET, FILM COATED ORAL DAILY
Refills: 0 | Status: DISCONTINUED | OUTPATIENT
Start: 2025-05-19 | End: 2025-05-19

## 2025-05-19 RX ADMIN — INSULIN LISPRO 1: 100 INJECTION, SOLUTION INTRAVENOUS; SUBCUTANEOUS at 17:14

## 2025-05-19 RX ADMIN — INSULIN LISPRO 2: 100 INJECTION, SOLUTION INTRAVENOUS; SUBCUTANEOUS at 08:05

## 2025-05-19 RX ADMIN — Medication 40 MILLIGRAM(S): at 05:37

## 2025-05-19 RX ADMIN — ATORVASTATIN CALCIUM 80 MILLIGRAM(S): 80 TABLET, FILM COATED ORAL at 22:46

## 2025-05-19 RX ADMIN — Medication 81 MILLIGRAM(S): at 12:17

## 2025-05-19 RX ADMIN — OXYBUTYNIN CHLORIDE 10 MILLIGRAM(S): 5 TABLET, FILM COATED, EXTENDED RELEASE ORAL at 22:46

## 2025-05-19 RX ADMIN — APIXABAN 5 MILLIGRAM(S): 2.5 TABLET, FILM COATED ORAL at 17:13

## 2025-05-19 RX ADMIN — APIXABAN 5 MILLIGRAM(S): 2.5 TABLET, FILM COATED ORAL at 05:37

## 2025-05-19 RX ADMIN — METOPROLOL SUCCINATE 12.5 MILLIGRAM(S): 50 TABLET, EXTENDED RELEASE ORAL at 05:37

## 2025-05-19 RX ADMIN — LISINOPRIL 10 MILLIGRAM(S): 5 TABLET ORAL at 12:16

## 2025-05-19 RX ADMIN — ESCITALOPRAM OXALATE 5 MILLIGRAM(S): 20 TABLET ORAL at 12:17

## 2025-05-19 NOTE — OCCUPATIONAL THERAPY INITIAL EVALUATION ADULT - ADDITIONAL COMMENTS
Patient is a right handed male who lives with his spouse in the basement of a private home, chairlift to access main level, 3 steps to enter home.

## 2025-05-19 NOTE — DISCHARGE NOTE PROVIDER - NSDCFUADDAPPT_GEN_ALL_CORE_FT
APPTS ARE READY TO BE MADE: [X] YES    Best Family or Patient Contact (if needed):    Additional Information about above appointments (if needed):    1:   2:   3:     Other comments or requests:   APPTS ARE READY TO BE MADE: [X] YES    Best Family or Patient Contact (if needed):    Additional Information about above appointments (if needed):    1: PCP   2: Neurology  3:     Other comments or requests:   APPTS ARE READY TO BE MADE: [X] YES    Best Family or Patient Contact (if needed):    Additional Information about above appointments (if needed):    1: PCP   2: Neurology  3:     Other comments or requests:  Patient was outreached but declined scheduling assistance, however there is an appointment reflected on [facility] for the patient. Appointment is on 7/10 at 10A with Dr. Salcedo at [address] [place] [address]. Patient declined assistance securing sooner appointment.     Patient informed us they already have secured a follow up appointment which is not visible on [facility] and declined to provide appointment details. Patient's family member advised they already secured a follow up with their PCP & Cardiologist post discharge.

## 2025-05-19 NOTE — PROGRESS NOTE ADULT - PROBLEM SELECTOR PLAN 6
Hx of urinary incontinence on oxybutynin 10mg XL QD    - cw home medications

## 2025-05-19 NOTE — OCCUPATIONAL THERAPY INITIAL EVALUATION ADULT - DIAGNOSIS, OT EVAL
Decreased cognition, balance, endurance, and coordination impacting participation in ADLs, IADLs, functional transfers, and functional mobility

## 2025-05-19 NOTE — PROGRESS NOTE ADULT - ATTENDING COMMENTS
80yM with PMH CAD s/p 6 stents, s/p CABG 20 yrs ago on ASA could not tolerate Plavix,  HTN HLD DM Dementia presenting with left facial droop brought in by family after returning from Hospital home took a shower and wife noticed facial asymmetry. Admitted for acute cva     Pt was evaluated, no new complaints.     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 Fib  New onset A. Fib suspect ppt by volume depletion s/p bowel prep converted to Sinus rhythm (PAF)  Tachycardia due to intravascular volume depletion now rate controlled  New onset LV dysfunction/ Systolic HF (undiagnosed HF)  Elevated Troponin demand ischemia vs HTN   Hx Type 2 DM/ HTN/ Mild dementia  Hx Major depression/ Urinary incontinence    Plan:  Patient s/p Code stroke in ED; not a candidate for tpA as per Stroke team as reported by  ED physician  Continue Ecotrin and high dose Statin Lipitor 80 mg HS (home dose)  Tele reviewed; HR controlled; Ronald to 50; 2D Echo noted LVEF 35 to 40%  Neuro consult appreciated; d/w Dr. Doshi; may use anti HTN judiciously as there is evidence of Carotid stenosis on CT Angio  MRI with multiple  strokes involving multiple areas c/w  embolic CVA given underlying newly found A. Fib as well as CT findings of old and new stroke;  BP stable, c/w metoprolol, add arb given CHF  Checked  eligibility for DOACs; $50 copay; transitioned to Apixaban 5mg BID   Cardio Consult and f/u appreciated d/w Dr. Harrison; initially planned Digoxin lading but converted to sinus; will hold off Digoxin  Hold oral hypoglycemics; Accu-Chek with Insulin scale; A1c slightly elevated; Lipid panel noted  Resume oral hypoglycemic as patient is on a diet  Speech and swallow eval appreciated;  soft and bite sized food; thin liquids okay  PT OT eval appreciated   D/w CM, patient was not accepted to [facility]. family now prefers home PT. Spoke w/ JONI, DC home in am

## 2025-05-19 NOTE — PROGRESS NOTE ADULT - PROBLEM SELECTOR PLAN 7
Hx of dementia on donepezil 5mg QD  s/p 1mg haldol for code gray on 5/16    - cw home medications  - will likely require 1mg ativan for MRI

## 2025-05-19 NOTE — PROGRESS NOTE ADULT - TIME BILLING
- Review of records, telemetry, vital signs and daily labs.   - General and cardiovascular physical examination.  - Generation of cardiovascular treatment plan and completion of note .  - Coordination of care.      Patient was seen and examined by me on  5/19/25,interim events noted,labs and radiology studies reviewed.  Alfredo Harrison MD,FACC.  [address] [place], [place] [id number].  [phone]  Availabe to call or text on [employer] TEAMS.
- Review of records, telemetry, vital signs and daily labs.   - General and cardiovascular physical examination.  - Generation of cardiovascular treatment plan and completion of note .  - Coordination of care.      Patient was seen and examined by me on  5/17/25,interim events noted,labs and radiology studies reviewed.  Alfredo Harrison MD,FACC.  [address] [place], [place] [id number].  [phone]  Availabe to call or text on [employer] TEAMS.
- Review of records, telemetry, vital signs and daily labs.   - General and cardiovascular physical examination.  - Generation of cardiovascular treatment plan and completion of note .  - Coordination of care.      Patient was seen and examined by me on  5/18/25,interim events noted,labs and radiology studies reviewed.  Alfredo Harrison MD,FACC.  [address] [place], [place] [id number].  [phone]  Availabe to call or text on [employer] TEAMS.

## 2025-05-19 NOTE — OCCUPATIONAL THERAPY INITIAL EVALUATION ADULT - SPECIAL TRAINING, OT EVAL
MODIFIED LACEY SCALE:  3: Moderate Disability: Requiring some help, but able to walk without assistance.

## 2025-05-19 NOTE — PROGRESS NOTE ADULT - PROBLEM SELECTOR PLAN 5
Hx of DM on glipizide 10 BID, pioglitazone 15 QD, jardiance 10mg 3 days a week    A1c 8.4  - Hold home med   - SSI, FS  - Diabetic diet
Hx of DM on glipizide 10 BID, pioglitazone 15 QD, jardiance 10mg 3 days a week      - Hold home med   - SSI, FS  - Diabetic diet  - f/u A1c
Hx of DM on glipizide 10 BID, pioglitazone 15 QD, jardiance 10mg 3 days a week    A1c 8.4  - Hold home med   - SSI, FS  - Diabetic diet

## 2025-05-19 NOTE — DISCHARGE NOTE PROVIDER - NSDCFUSCHEDAPPT_GEN_ALL_CORE_FT
Guillaume Salcedo  [facility]  NEUROLOGY [address] [facility]  Scheduled Appointment: 07/10/2025

## 2025-05-19 NOTE — OCCUPATIONAL THERAPY INITIAL EVALUATION ADULT - NS ASR FOLLOW COMMAND OT EVAL
demonstrated difficulty following commands, required intermittent visual demonstrations of tasks and/or tactile cuing/50% of the time

## 2025-05-19 NOTE — DISCHARGE NOTE PROVIDER - HOSPITAL COURSE
80M from home, ambulates independently with PMH CAD s/p 6 stents, HTN HLD DM Dementia (AAOx2 baseline) urinary incontinence and depression presents with new onset L sided partial facial droop and slurred speech. Code stroke in ED. Admitted to telemetry for stroke and new onset AFib. CTH: Multiple old infarcts along with generalized small vessel ischemic changes. There infarcts in both MCA territories more extensive on the right. The perfusion study suggests an area of penumbra surrounding the right frontal MCA infarct, raising the possibility of new ischemia. Also there are areas of hypoperfusion in the left temporal lobe as well as bilateral frontal white matter. Approximately 50-60% narrowing suggested in the proximal left internal carotid. Approximately 30% narrowing suggested of the right internal carotid. TTE: LVEF 35-40%, G2DD, increased LV mass and eccentric hypertrophy. MRI: multiple acute infarcts, largest involving right motor strip SLP eval: pureed diet with thin liquids. EKG: AFib. CHADSVASC: 6. Started ERliquis 5 mg BID and Metoprolol Succinate 12.5 mg QD. Will hold pt's Pioglitazone and Donepezil upon DC. Patient was medically optimized, stable and ready for discharge. Plan of care and return precautions were discussed with the patient who verbally stated understanding. 80M from home, ambulates independently with PMH CAD s/p 6 stents, HTN HLD DM Dementia (AAOx2 baseline) urinary incontinence and depression presents with new onset L sided partial facial droop and slurred speech. Code stroke in ED. Admitted to telemetry for stroke and new onset AFib. CTH: Multiple old infarcts along with generalized small vessel ischemic changes. There are infarcts in both MCA territories more extensive on the right. The perfusion study suggests an area of penumbra surrounding the right frontal MCA infarct, raising the possibility of new ischemia. Also there are areas of hypoperfusion in the left temporal lobe as well as bilateral frontal white matter. Approximately 50-60% narrowing suggested in the proximal left internal carotid. Approximately 30% narrowing suggested of the right internal carotid. TTE: LVEF 35-40%, G2DD, increased LV mass and eccentric hypertrophy. MRI: multiple acute infarcts, largest involving right motor strip SLP eval: pureed diet with thin liquids. EKG: AFib. CHADSVASC: 6. Started ERliquis 5 mg BID and Metoprolol Succinate 12.5 mg QD. Will hold pt's Pioglitazone and Donepezil upon DC. Patient was medically optimized, stable and ready for discharge. Plan of care and return precautions were discussed with the patient who verbally stated understanding.    Attending Attestation:    Patient was admitted for CVA with concern for embolic infarctions given the MR Brain result. Cardiology and Neurology were consulted to assist with management.   Patient was found to have newly discovered atrial fibrillation as well as heart failure with midrange ejection fraction, not in acute decompensation (i.e. chronic systolic heart failure).  Patient had spontaneously converted back to normal sinus rhythm and was bradycardic to 40s-50s on metoprolol succinate at the lowest dose, so beta blocker was held at DC.     Patient was recommended for acute rehab but he and his family declined this and opted for home physical therapy.     For stroke risk reduction, patient will DC on apixaban (given AFIB) as well as statin therapy.    80M from home, ambulates independently with PMH CAD s/p 6 stents, HTN HLD DM Dementia (AAOx2 baseline) urinary incontinence and depression presents with new onset L sided partial facial droop and slurred speech. Code stroke in ED. Admitted to telemetry for stroke and new onset AFib. CTH: Multiple old infarcts along with generalized small vessel ischemic changes. There are infarcts in both MCA territories more extensive on the right. The perfusion study suggests an area of penumbra surrounding the right frontal MCA infarct, raising the possibility of new ischemia. Also there are areas of hypoperfusion in the left temporal lobe as well as bilateral frontal white matter. Approximately 50-60% narrowing suggested in the proximal left internal carotid. Approximately 30% narrowing suggested of the right internal carotid. TTE: LVEF 35-40%, G2DD, increased LV mass and eccentric hypertrophy. MRI: multiple acute infarcts, largest involving right motor strip SLP eval: pureed diet with thin liquids. EKG: AFib. CHADSVASC: 6. Started Eliquis 5 mg BID and Metoprolol Succinate 12.5 mg QD. Will hold BB as pt converted back to NSR and was bradycardic to 40s-50s. Will also hold pt's Pioglitazone and Donepezil upon DC. Patient was medically optimized, stable and ready for discharge. Plan of care and return precautions were discussed with the patient who verbally stated understanding.    Attending Attestation:    Patient was admitted for CVA with concern for embolic infarctions given the MR Brain result. Cardiology and Neurology were consulted to assist with management.   Patient was found to have newly discovered atrial fibrillation as well as heart failure with midrange ejection fraction, not in acute decompensation (i.e. chronic systolic heart failure).  Patient had spontaneously converted back to normal sinus rhythm and was bradycardic to 40s-50s on metoprolol succinate at the lowest dose, so beta blocker was held at DC.     Patient was recommended for acute rehab but he and his family declined this and opted for home physical therapy.     For stroke risk reduction, patient will DC on apixaban (given AFIB) as well as statin therapy.

## 2025-05-19 NOTE — PROGRESS NOTE ADULT - SUBJECTIVE AND OBJECTIVE BOX
MR#[id number]  PATIENT NAME:-TIAGO EAGLE    DATE OF SERVICE: 05-19-25 @ 09:56  Patient was seen and examined by Alfredo Harrison MD on    05-19-25 @ 09:56 .  Interim events noted.Consultant notes ,Labs,Telemetry reviewed by me       HOSPITAL COURSE: HPI:  80yM from home, ambulates independently with PMH CAD s/p 6 stents, HTN HLD DM Dementia (AAOx2 baseline) urinary incontinence and depression presents with new onset L sided partial facial droop and slurred speech. Pt endorsed SOB on exertion 3 days ago to his cardiologist, recommended a TTE which apparently showed EF of 38%. Was admitted to [facility] 2 days ago for a colonoscopy, family at bedside is unsure of the reason for it. Was NPO and did not complete bowel prep. Was brought home around 2PM, and had an episode of urinary incontinence. At 2:30 after the patient showered, the wife noticed facial droop, slurring of his words, and drooling from the left side of his mouth. On arrival to the ED, code stroke was called, NIHSS by ED attending was 2. On examination by the admitting team, NIHSS was 5. Denies burning urination, chest pain, nausea and vomiting.    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 There infarcts in both MCA territories more extensive on the right.  - The perfusion study suggests an area of penumbra surrounding the right   frontal MCA infarct, raising the possibility of new ischemia. Also there   are areas of hypoperfusion in the left temporal lobe as well as bilateral   frontal white matter.  - Approximately 50-60% narrowing suggested in the proximal left internal carotid. Approximately 30% narrowing suggested of the right internal carotid.  s/p ASA and atorvastatin  s/p bolus NS 1L (15 May 2025 19:47)      INTERIM EVENTS:Patient seen at bedside ,interim events noted.      PMH -reviewed admission note, no change since admission  HEART FAILURE: Acute[ ]Chronic[ ] Systolic[ ] Diastolic[ ] Combined Systolic and Diastolic[ ]  CAD[ ] CABG[ ] PCI[ ]  DEVICES[ ] PPM[ ] ICD[ ] ILR[ ]  ATRIAL FIBRILLATION[ ] Paroxysmal[ ] Permanent[ ] CHADS2-[  ]  MICHAEL[ ] CKD1[ ] CKD2[ ] CKD3[ ] CKD4[ ] ESRD[ ]  COPD[ ] HTN[ ]   DM[ ] Type1[ ] Type 2[ ]   CVA[ ] Paresis[ ]    AMBULATION: Assisted[ ] Cane/walker[ ] Independent[ ]    MEDICATIONS  (STANDING):  apixaban 5 milliGRAM(s) Oral every 12 hours  aspirin enteric coated 81 milliGRAM(s) Oral daily  atorvastatin 80 milliGRAM(s) Oral at bedtime  escitalopram 5 milliGRAM(s) Oral daily  insulin lispro (ADMELOG) corrective regimen sliding scale   SubCutaneous three times a day before meals  insulin lispro (ADMELOG) corrective regimen sliding scale   SubCutaneous at bedtime  lisinopril 10 milliGRAM(s) Oral daily  metoprolol succinate ER 12.5 milliGRAM(s) Oral daily  oxybutynin XL 10 milliGRAM(s) Oral at bedtime  pantoprazole    Tablet 40 milliGRAM(s) Oral before breakfast    MEDICATIONS  (PRN):            REVIEW OF SYSTEMS:  Constitutional: [ ] fever, [ ]weight loss,  [ ]fatigue [ ]weight gain  Eyes: [ ] visual changes  Respiratory: [ ]shortness of breath;  [ ] cough, [ ]wheezing, [ ]chills, [ ]hemoptysis  Cardiovascular: [ ] chest pain, [ ]palpitations, [ ]dizziness,  [ ]leg swelling[ ]orthopnea[ ]PND  Gastrointestinal: [ ] abdominal pain, [ ]nausea, [ ]vomiting,  [ ]diarrhea [ ]Constipation [ ]Melena  Genitourinary: [ ] dysuria, [ ] hematuria [ ]Freeman  Neurologic: [ ] headaches [ ] tremors[ ]weakness [ ]Paralysis Right[ ] Left[ ]  Skin: [ ] itching, [ ]burning, [ ] rashes  Endocrine: [ ] heat or cold intolerance  Musculoskeletal: [ ] joint pain or swelling; [ ] muscle, back, or extremity pain  Psychiatric: [ ] depression, [ ]anxiety, [ ]mood swings, or [ ]difficulty sleeping  Hematologic: [ ] easy bruising, [ ] bleeding gums    [ ] All remaining systems negative except as per above.   [ ]Unable to obtain.  [x] No change in ROS since admission      Vital Signs Last 24 Hrs  T(C): 36.7 (19 May 2025 15:51), Max: 37 (19 May 2025 11:00)  T(F): 98.1 (19 May 2025 15:51), Max: 98.6 (19 May 2025 11:00)  HR: 63 (19 May 2025 15:51) (57 - 68)  BP: 110/71 (19 May 2025 15:51) (110/71 - 136/89)  BP(mean): 83 (19 May 2025 15:51) (83 - 83)  RR: 17 (19 May 2025 15:51) (17 - 18)  SpO2: 93% (19 May 2025 15:51) (92% - 99%)    Parameters below as of 19 May 2025 15:51  Patient On (Oxygen Delivery Method): room air      I&O's Summary      PHYSICAL EXAM:  General: No acute distress BMI-  HEENT: EOMI, PERRL  Neck: Supple, [ ] JVD  Lungs: Equal air entry bilaterally; [ ] rales [ ] wheezing [ ] rhonchi  Heart: Regular rate and rhythm; [x ] murmur   2/6 [ x] systolic [ ] diastolic [ ] radiation[ ] rubs [ ]  gallops  Abdomen: Nontender, bowel sounds present  Extremities: No clubbing, cyanosis, [ ] edema [ ]Pulses  equal and intact  Nervous system:  Alert & Oriented X3, no focal deficits  Psychiatric: Normal affect  Skin: No rashes or lesions    LABS:  05-18    133[L]  |  102  |  18  ----------------------------<  209[H]  3.9   |  22  |  1.14    Ca    8.9      18 May 2025 06:55  Phos  2.8     05-18  Mg     1.7     05-18    TPro  6.6  /  Alb  3.0[L]  /  TBili  0.5  /  DBili  x   /  AST  14  /  ALT  12  /  AlkPhos  45  05-18    Creatinine Trend: 1.14<--, 1.03<--, 1.02<--, 1.27<--                        14.8   5.19  )-----------( 177      ( 18 May 2025 06:55 )             42.9

## 2025-05-19 NOTE — PROGRESS NOTE ADULT - SUBJECTIVE AND OBJECTIVE BOX
Progress Note discussed with attending    MS TEAMS Mickey Rangel OF THIS NOTE TILL 5:00 PM  PLEASE CONTACT ON CALL TEAM:  - On Call Team (Please refer to Dennis) FROM 5:00 PM - 8:30PM  - Nightfloat Team FROM 8:30 -7:30 AM    INTERVAL HPI/OVERNIGHT EVENTS: Pt examined at bedside this am. No overnight events. No complaints. NIHSS score of 3 on my assessment.       MEDICATIONS  (STANDING):  apixaban 5 milliGRAM(s) Oral every 12 hours  aspirin enteric coated 81 milliGRAM(s) Oral daily  atorvastatin 80 milliGRAM(s) Oral at bedtime  escitalopram 5 milliGRAM(s) Oral daily  insulin lispro (ADMELOG) corrective regimen sliding scale   SubCutaneous three times a day before meals  insulin lispro (ADMELOG) corrective regimen sliding scale   SubCutaneous at bedtime  lisinopril 10 milliGRAM(s) Oral daily  metoprolol succinate ER 12.5 milliGRAM(s) Oral daily  oxybutynin XL 10 milliGRAM(s) Oral at bedtime  pantoprazole    Tablet 40 milliGRAM(s) Oral before breakfast    MEDICATIONS  (PRN):      REVIEW OF SYSTEMS:  CONSTITUTIONAL: No fever, weight loss, or fatigue  RESPIRATORY: No shortness of breath  CARDIOVASCULAR: No chest pain  GASTROINTESTINAL: No abdominal pain.  GENITOURINARY: No dysuria  NEUROLOGICAL: No headaches  SKIN: No itching, burning, rashes    Vital Signs Last 24 Hrs  T(C): 37 (19 May 2025 11:00), Max: 37 (19 May 2025 11:00)  T(F): 98.6 (19 May 2025 11:00), Max: 98.6 (19 May 2025 11:00)  HR: 64 (19 May 2025 11:14) (57 - 74)  BP: 122/82 (19 May 2025 11:14) (97/72 - 136/89)  BP(mean): 94 (18 May 2025 19:46) (94 - 94)  RR: 18 (19 May 2025 11:14) (18 - 18)  SpO2: 99% (19 May 2025 11:14) (92% - 99%)    Parameters below as of 19 May 2025 11:14  Patient On (Oxygen Delivery Method): room air        PHYSICAL EXAMINATION:  GENERAL: NAD, elderly male lying in bed  HEAD:  Atraumatic, Normocephalic  EYES:  conjunctiva and sclera clear  NECK: Supple, No JVD  CHEST/LUNG: Clear to auscultation bilaterally; No rales, rhonchi, wheezing, or rubs  HEART: Regular rate and rhythm; No murmurs, rubs, or gallops  ABDOMEN: Soft, Nontender, Nondistended; Bowel sounds present, no guarding  NERVOUS SYSTEM:  Alert & Oriented X2. Dysarthria. +LUE drift. +LLE drift. Lower facial weakness on the left.   : voiding well  EXTREMITIES:  2+ Peripheral Pulses, No clubbing, cyanosis, or edema  SKIN: warm dry                          14.8   5.19  )-----------( 177      ( 18 May 2025 06:55 )             42.9     05-18    133[L]  |  102  |  18  ----------------------------<  209[H]  3.9   |  22  |  1.14    Ca    8.9      18 May 2025 06:55  Phos  2.8     05-18  Mg     1.7     05-18    TPro  6.6  /  Alb  3.0[L]  /  TBili  0.5  /  DBili  x   /  AST  14  /  ALT  12  /  AlkPhos  45  05-18    LIVER FUNCTIONS - ( 18 May 2025 06:55 )  Alb: 3.0 g/dL / Pro: 6.6 g/dL / ALK PHOS: 45 U/L / ALT: 12 U/L DA / AST: 14 U/L / GGT: x                   CAPILLARY BLOOD GLUCOSE      RADIOLOGY & ADDITIONAL TESTS:

## 2025-05-19 NOTE — DISCHARGE NOTE PROVIDER - NSDCMRMEDTOKEN_GEN_ALL_CORE_FT
apixaban 5 mg oral tablet: 1 tab(s) orally 2 times a day  atorvastatin 80 mg oral tablet: 1 tab(s) orally once a day  cyanocobalamin 2500 mcg oral tablet: 1 tab(s) orally once a day  donepezil 5 mg oral tablet: 1 tab(s) orally once a day with lunch  Ecotrin Adult Low Strength 81 mg oral delayed release tablet: 1 tab(s) orally once a day  escitalopram 5 mg oral tablet: 1 tab(s) orally once a day (in the morning)  fenofibrate 160 mg oral tablet: 1 tab(s) orally once a day  glipiZIDE 10 mg oral tablet: 1 tab(s) orally 2 times a day  Jardiance 10 mg oral tablet: 1 tab(s) orally 3 times a week before breakfast  omega-3 polyunsaturated fatty acids 500 mg oral capsule: 1 cap(s) orally once a day  oxyBUTYnin 10 mg/24 hr oral tablet, extended release: 1 tab(s) orally once a day  pantoprazole 40 mg oral delayed release tablet: 1 tab(s) orally once a day  ramipril 2.5 mg oral capsule: 1 cap(s) orally once a day  Vitamin D3 1250 mcg (50,000 intl units) oral capsule: 1 cap(s) orally once a week on Friday   apixaban 5 mg oral tablet: 1 tab(s) orally every 12 hours  apixaban 5 mg oral tablet: 1 tab(s) orally 2 times a day  atorvastatin 80 mg oral tablet: 1 tab(s) orally once a day  cyanocobalamin 2500 mcg oral tablet: 1 tab(s) orally once a day  Ecotrin Adult Low Strength 81 mg oral delayed release tablet: 1 tab(s) orally once a day  escitalopram 5 mg oral tablet: 1 tab(s) orally once a day (in the morning)  glipiZIDE 10 mg oral tablet: 1 tab(s) orally 2 times a day  Jardiance 10 mg oral tablet: 1 tab(s) orally 3 times a week before breakfast  Metoprolol Succinate ER 25 mg oral tablet, extended release: 0.5 tab(s) orally once a day  omega-3 polyunsaturated fatty acids 500 mg oral capsule: 1 cap(s) orally once a day  oxyBUTYnin 10 mg/24 hr oral tablet, extended release: 1 tab(s) orally once a day  pantoprazole 40 mg oral delayed release tablet: 1 tab(s) orally once a day  ramipril 2.5 mg oral capsule: 1 cap(s) orally once a day  Vitamin D3 1250 mcg (50,000 intl units) oral capsule: 1 cap(s) orally once a week on Friday   apixaban 5 mg oral tablet: 1 tab(s) orally every 12 hours  apixaban 5 mg oral tablet: 1 tab(s) orally 2 times a day  atorvastatin 80 mg oral tablet: 1 tab(s) orally once a day  cyanocobalamin 2500 mcg oral tablet: 1 tab(s) orally once a day  Ecotrin Adult Low Strength 81 mg oral delayed release tablet: 1 tab(s) orally once a day  escitalopram 5 mg oral tablet: 1 tab(s) orally once a day (in the morning)  glipiZIDE 10 mg oral tablet: 1 tab(s) orally 2 times a day  Jardiance 10 mg oral tablet: 1 tab(s) orally 3 times a week before breakfast  omega-3 polyunsaturated fatty acids 500 mg oral capsule: 1 cap(s) orally once a day  oxyBUTYnin 10 mg/24 hr oral tablet, extended release: 1 tab(s) orally once a day  pantoprazole 40 mg oral delayed release tablet: 1 tab(s) orally once a day  ramipril 2.5 mg oral capsule: 1 cap(s) orally once a day  Vitamin D3 1250 mcg (50,000 intl units) oral capsule: 1 cap(s) orally once a week on Friday

## 2025-05-19 NOTE — OCCUPATIONAL THERAPY INITIAL EVALUATION ADULT - RANGE OF MOTION EXAMINATION, UPPER EXTREMITY
deficits noted with left UE shoulder flexion (achieved ~100 degrees)/Right UE Active ROM was WFL (within functional limits)

## 2025-05-19 NOTE — OCCUPATIONAL THERAPY INITIAL EVALUATION ADULT - GENERAL OBSERVATIONS, REHAB EVAL
Patient received supine in unit room bed, VSS, NAD, AxOx2, +tele, PCA at bedside, no c/o of pain, agreeable to evaluation

## 2025-05-19 NOTE — PROGRESS NOTE ADULT - PROBLEM SELECTOR PLAN 3
Hx of HTN on ramipril 2.5 QD    - hold BP meds for permissive HTN x 24hr  - DASH diet

## 2025-05-19 NOTE — PROGRESS NOTE ADULT - PROBLEM SELECTOR PLAN 4
Hx of HLD on atorvastatin 80mg qdhs    - c/w home med

## 2025-05-19 NOTE — OCCUPATIONAL THERAPY INITIAL EVALUATION ADULT - NSOTDISCHREC_GEN_A_CORE
Patient is highly motivated to regain prior level of function. When medically cleared for discharge, patient is anticipated to tolerate multidisciplinary, restorative therapies for 3 hours a day for at least 5 days a week OR alternative frequency and session duration consisting of at least 15hrs within 7 day initial period as determined by center. With acute rehabilitation services, patient is anticipated to make significant gains in function, within a reasonable period, and with high likelihood of return to supportive home environment. Discharge recommendation discussed with patient who is interested and motivated for AR./Acute Inpatient Rehab

## 2025-05-19 NOTE — PROGRESS NOTE ADULT - PROBLEM SELECTOR PLAN 8
Hx of MDD on escitalopram 5mg QD    - cw home medications

## 2025-05-19 NOTE — PROGRESS NOTE ADULT - PROBLEM SELECTOR PLAN 2
p/w new onset dysarthria and L partial facial droop  EKG: Afib 100BPM  CHADSVASC: 6  Cardio Dr. Harrison consulted  S/p dig - d/marion as patient in sinus rhythm HR 60s and 70s   - C/w on FD lovenox  - Started on Metoprolol 12.5 BID for rate control  - Tele monitoring (Goal rate <110)  - f/u TTE  - fu repeat trop (first trop 459) > 3230 likely 2/2 post-cath and demand from stroke
p/w new onset dysarthria and L partial facial droop  EKG: Afib 100BPM  CHADSVASC: 6  Cardio Dr. Harrison consulted  S/p dig - d/marion as patient in sinus rhythm HR 60s and 70s   Trop elevated 2/2 demand ischemia and recent cath  - C/w eliquis - checked with pharmacy, copay is $50/month  - C/w  Metoprolol Succinate 12.5 qd for rate control  - Tele monitoring (Goal rate <110)
p/w new onset dysarthria and L partial facial droop  EKG: Afib 100BPM  CHADSVASC: 6  Cardio Dr. Harrison consulted  S/p dig - d/marion as patient in sinus rhythm HR 60s and 70s   Trop elevated 2/2 demand ischemia and recent cath  - C/w eliquis - checked with pharmacy, copay is $50/month  - C/w  Metoprolol Succinate 12.5 qd for rate control  - Tele monitoring (Goal rate <110)

## 2025-05-19 NOTE — DISCHARGE NOTE PROVIDER - CARE PROVIDER_API CALL
Rey Gao  Internal Medicine  [address] [place] [address]  Phone: [phone]  Fax: [phone]  Follow Up Time:     Benedicto Langley  Neurology  [address] [facility] [address] [facility] [place] [address]  Phone: [phone]  Fax: [phone]  Follow Up Time:     Alfredo Harrison  Cardiovascular Disease  [address] [place] [address]  Phone: [phone]  Fax: [phone]  Follow Up Time:

## 2025-05-19 NOTE — DISCHARGE NOTE PROVIDER - PROVIDER TOKENS
PROVIDER:[TOKEN:[[id number]]],PROVIDER:[TOKEN:[[id number]]],PROVIDER:[TOKEN:[[id number]]]

## 2025-05-19 NOTE — PROGRESS NOTE ADULT - PROBLEM SELECTOR PLAN 1
p/w slurred speech and L sided partial facial droop w/ last known normal at: 2:30PM 5/15  code stroke in ED with NIHSS 2  Reexamination NIHSS 5  ASA qd, high intensity statin qhs  Allergy to plavix  CT head, stroke protocol shows multiple old infarcts  Tele monitoring  TTE: LVEF 35-40%, G2DD, increased LV mass and eccentric hypertrophy  Monitor BP - allow permissive htn x24hr from last known normal  PT consult - rec AR  Dysphagia screen: passed at bedside  Neuro checks q4  Neuro Consulted: Dr Langley  MRI: multiple acute infarcts, largest involving right motor strip  SLP eval: pureed diet with thin liquids
p/w slurred speech and L sided partial facial droop w/ last known normal at: 2:30PM 5/15  code stroke in ED with NIHSS 2  Reexamination NIHSS 5  ASA qd, high intensity statin qhs  Allergy to plavix  CT head, stroke protocol shows multiple old infarcts  Tele monitoring  TTE: LVEF 35-40%, G2DD, increased LV mass and eccentric hypertrophy  Monitor BP - allow permissive htn x24hr from last known normal  PT consult - rec AR  Dysphagia screen: passed at bedside  Neuro checks q4  Neuro Consulted: Dr Langley  MRI: multiple acute infarcts, largest involving right motor strip  SLP eval: pureed diet with thin liquids
p/w slurred speech and L sided partial facial droop w/ last known normal at: 2:30PM 5/15  code stroke in ED with NIHSS 2  Reexamination NIHSS 5  ASA qd, high intensity statin qhs  Allergy to plavix  CT head, stroke protocol shows  Tele monitoring  F/U Echo with bubble study  Monitor BP - allow permissive htn x24hr from last known normal  PT consult - rec AR  Dysphagia screen: passed at bedside  Neuro checks q4  Neuro Consulted: Dr Langley  F/u MRI  F/u OT consult  F/u SLP

## 2025-05-19 NOTE — DISCHARGE NOTE PROVIDER - ATTENDING DISCHARGE PHYSICAL EXAMINATION:
GENERAL: NAD, well-developed  HEAD:  Atraumatic, Normocephalic  NECK: Supple, No JVD  CHEST/LUNG: Clear to auscultation bilaterally; No wheeze  HEART: Regular rate and rhythm; back in sinus rhythm on tele monitoring  ABDOMEN: Soft, Nontender, Nondistended  PSYCH: AAOx3  NEUROLOGY: left upper extremity motor deficits 4/5 throughout left UE; slurred speech noted as well, mild  SKIN: No rashes or lesions

## 2025-05-20 ENCOUNTER — TRANSCRIPTION ENCOUNTER (OUTPATIENT)
Age: 80
End: 2025-05-20

## 2025-05-20 VITALS
HEART RATE: 59 BPM | RESPIRATION RATE: 18 BRPM | SYSTOLIC BLOOD PRESSURE: 98 MMHG | TEMPERATURE: 98 F | OXYGEN SATURATION: 97 % | DIASTOLIC BLOOD PRESSURE: 57 MMHG

## 2025-05-20 LAB
ALBUMIN SERPL ELPH-MCNC: 3 G/DL — LOW (ref 3.5–5)
ALP SERPL-CCNC: 53 U/L — SIGNIFICANT CHANGE UP (ref 40–120)
ALT FLD-CCNC: 18 U/L DA — SIGNIFICANT CHANGE UP (ref 10–60)
ANION GAP SERPL CALC-SCNC: 5 MMOL/L — SIGNIFICANT CHANGE UP (ref 5–17)
AST SERPL-CCNC: 15 U/L — SIGNIFICANT CHANGE UP (ref 10–40)
BASOPHILS # BLD AUTO: 0.02 K/UL — SIGNIFICANT CHANGE UP (ref 0–0.2)
BASOPHILS NFR BLD AUTO: 0.4 % — SIGNIFICANT CHANGE UP (ref 0–2)
BILIRUB SERPL-MCNC: 0.6 MG/DL — SIGNIFICANT CHANGE UP (ref 0.2–1.2)
BUN SERPL-MCNC: 17 MG/DL — SIGNIFICANT CHANGE UP (ref 7–18)
CALCIUM SERPL-MCNC: 9.6 MG/DL — SIGNIFICANT CHANGE UP (ref 8.4–10.5)
CHLORIDE SERPL-SCNC: 104 MMOL/L — SIGNIFICANT CHANGE UP (ref 96–108)
CO2 SERPL-SCNC: 24 MMOL/L — SIGNIFICANT CHANGE UP (ref 22–31)
CREAT SERPL-MCNC: 1.08 MG/DL — SIGNIFICANT CHANGE UP (ref 0.5–1.3)
EGFR: 70 ML/MIN/1.73M2 — SIGNIFICANT CHANGE UP
EGFR: 70 ML/MIN/1.73M2 — SIGNIFICANT CHANGE UP
EOSINOPHIL # BLD AUTO: 0.29 K/UL — SIGNIFICANT CHANGE UP (ref 0–0.5)
EOSINOPHIL NFR BLD AUTO: 5.8 % — SIGNIFICANT CHANGE UP (ref 0–6)
GLUCOSE BLDC GLUCOMTR-MCNC: 167 MG/DL — HIGH (ref 70–99)
GLUCOSE BLDC GLUCOMTR-MCNC: 209 MG/DL — HIGH (ref 70–99)
GLUCOSE SERPL-MCNC: 168 MG/DL — HIGH (ref 70–99)
HCT VFR BLD CALC: 44.9 % — SIGNIFICANT CHANGE UP (ref 39–50)
HGB BLD-MCNC: 15.5 G/DL — SIGNIFICANT CHANGE UP (ref 13–17)
IMM GRANULOCYTES NFR BLD AUTO: 0.2 % — SIGNIFICANT CHANGE UP (ref 0–0.9)
LYMPHOCYTES # BLD AUTO: 1.35 K/UL — SIGNIFICANT CHANGE UP (ref 1–3.3)
LYMPHOCYTES # BLD AUTO: 27 % — SIGNIFICANT CHANGE UP (ref 13–44)
MAGNESIUM SERPL-MCNC: 1.8 MG/DL — SIGNIFICANT CHANGE UP (ref 1.6–2.6)
MCHC RBC-ENTMCNC: 28.9 PG — SIGNIFICANT CHANGE UP (ref 27–34)
MCHC RBC-ENTMCNC: 34.5 G/DL — SIGNIFICANT CHANGE UP (ref 32–36)
MCV RBC AUTO: 83.6 FL — SIGNIFICANT CHANGE UP (ref 80–100)
MONOCYTES # BLD AUTO: 0.42 K/UL — SIGNIFICANT CHANGE UP (ref 0–0.9)
MONOCYTES NFR BLD AUTO: 8.4 % — SIGNIFICANT CHANGE UP (ref 2–14)
NEUTROPHILS # BLD AUTO: 2.91 K/UL — SIGNIFICANT CHANGE UP (ref 1.8–7.4)
NEUTROPHILS NFR BLD AUTO: 58.2 % — SIGNIFICANT CHANGE UP (ref 43–77)
NRBC BLD AUTO-RTO: 0 /100 WBCS — SIGNIFICANT CHANGE UP (ref 0–0)
PHOSPHATE SERPL-MCNC: 3 MG/DL — SIGNIFICANT CHANGE UP (ref 2.5–4.5)
PLATELET # BLD AUTO: 191 K/UL — SIGNIFICANT CHANGE UP (ref 150–400)
POTASSIUM SERPL-MCNC: 4.2 MMOL/L — SIGNIFICANT CHANGE UP (ref 3.5–5.3)
POTASSIUM SERPL-SCNC: 4.2 MMOL/L — SIGNIFICANT CHANGE UP (ref 3.5–5.3)
PROT SERPL-MCNC: 7 G/DL — SIGNIFICANT CHANGE UP (ref 6–8.3)
RBC # BLD: 5.37 M/UL — SIGNIFICANT CHANGE UP (ref 4.2–5.8)
RBC # FLD: 13.4 % — SIGNIFICANT CHANGE UP (ref 10.3–14.5)
SODIUM SERPL-SCNC: 133 MMOL/L — LOW (ref 135–145)
WBC # BLD: 5 K/UL — SIGNIFICANT CHANGE UP (ref 3.8–10.5)
WBC # FLD AUTO: 5 K/UL — SIGNIFICANT CHANGE UP (ref 3.8–10.5)

## 2025-05-20 PROCEDURE — 82550 ASSAY OF CK (CPK): CPT

## 2025-05-20 PROCEDURE — 80053 COMPREHEN METABOLIC PANEL: CPT

## 2025-05-20 PROCEDURE — 0042T: CPT

## 2025-05-20 PROCEDURE — 93005 ELECTROCARDIOGRAM TRACING: CPT

## 2025-05-20 PROCEDURE — 70496 CT ANGIOGRAPHY HEAD: CPT

## 2025-05-20 PROCEDURE — 85730 THROMBOPLASTIN TIME PARTIAL: CPT

## 2025-05-20 PROCEDURE — 80061 LIPID PANEL: CPT

## 2025-05-20 PROCEDURE — 97162 PT EVAL MOD COMPLEX 30 MIN: CPT

## 2025-05-20 PROCEDURE — 70551 MRI BRAIN STEM W/O DYE: CPT

## 2025-05-20 PROCEDURE — 70450 CT HEAD/BRAIN W/O DYE: CPT

## 2025-05-20 PROCEDURE — 97530 THERAPEUTIC ACTIVITIES: CPT

## 2025-05-20 PROCEDURE — 84100 ASSAY OF PHOSPHORUS: CPT

## 2025-05-20 PROCEDURE — 99233 SBSQ HOSP IP/OBS HIGH 50: CPT

## 2025-05-20 PROCEDURE — 93306 TTE W/DOPPLER COMPLETE: CPT

## 2025-05-20 PROCEDURE — 85610 PROTHROMBIN TIME: CPT

## 2025-05-20 PROCEDURE — 83036 HEMOGLOBIN GLYCOSYLATED A1C: CPT

## 2025-05-20 PROCEDURE — 82553 CREATINE MB FRACTION: CPT

## 2025-05-20 PROCEDURE — 36415 COLL VENOUS BLD VENIPUNCTURE: CPT

## 2025-05-20 PROCEDURE — 97116 GAIT TRAINING THERAPY: CPT

## 2025-05-20 PROCEDURE — 83735 ASSAY OF MAGNESIUM: CPT

## 2025-05-20 PROCEDURE — 99291 CRITICAL CARE FIRST HOUR: CPT | Mod: 25

## 2025-05-20 PROCEDURE — 71045 X-RAY EXAM CHEST 1 VIEW: CPT

## 2025-05-20 PROCEDURE — 97165 OT EVAL LOW COMPLEX 30 MIN: CPT

## 2025-05-20 PROCEDURE — 84484 ASSAY OF TROPONIN QUANT: CPT

## 2025-05-20 PROCEDURE — 82962 GLUCOSE BLOOD TEST: CPT

## 2025-05-20 PROCEDURE — 85025 COMPLETE CBC W/AUTO DIFF WBC: CPT

## 2025-05-20 PROCEDURE — 99239 HOSP IP/OBS DSCHRG MGMT >30: CPT | Mod: GC

## 2025-05-20 PROCEDURE — 70498 CT ANGIOGRAPHY NECK: CPT

## 2025-05-20 PROCEDURE — 92610 EVALUATE SWALLOWING FUNCTION: CPT

## 2025-05-20 RX ORDER — DONEPEZIL HYDROCHLORIDE 5 MG/1
1 TABLET ORAL
Refills: 0 | DISCHARGE

## 2025-05-20 RX ORDER — METOPROLOL SUCCINATE 50 MG/1
0.5 TABLET, EXTENDED RELEASE ORAL
Qty: 15 | Refills: 0
Start: 2025-05-20 | End: 2025-06-18

## 2025-05-20 RX ORDER — FENOFIBRATE 160 MG/1
1 TABLET ORAL
Refills: 0 | DISCHARGE

## 2025-05-20 RX ORDER — APIXABAN 2.5 MG/1
1 TABLET, FILM COATED ORAL
Qty: 30 | Refills: 0
Start: 2025-05-20

## 2025-05-20 RX ADMIN — INSULIN LISPRO 2: 100 INJECTION, SOLUTION INTRAVENOUS; SUBCUTANEOUS at 08:25

## 2025-05-20 RX ADMIN — Medication 81 MILLIGRAM(S): at 11:58

## 2025-05-20 RX ADMIN — LISINOPRIL 10 MILLIGRAM(S): 5 TABLET ORAL at 06:45

## 2025-05-20 RX ADMIN — INSULIN LISPRO 1: 100 INJECTION, SOLUTION INTRAVENOUS; SUBCUTANEOUS at 12:02

## 2025-05-20 RX ADMIN — METOPROLOL SUCCINATE 12.5 MILLIGRAM(S): 50 TABLET, EXTENDED RELEASE ORAL at 06:44

## 2025-05-20 RX ADMIN — APIXABAN 5 MILLIGRAM(S): 2.5 TABLET, FILM COATED ORAL at 06:45

## 2025-05-20 RX ADMIN — Medication 40 MILLIGRAM(S): at 06:44

## 2025-05-20 RX ADMIN — ESCITALOPRAM OXALATE 5 MILLIGRAM(S): 20 TABLET ORAL at 11:58

## 2025-05-20 NOTE — PROGRESS NOTE ADULT - ASSESSMENT
Assessment and Plan:     Assessment : 80M, w/ PMH of CAD , s/p 6stents, HTN, HLD< DM, Dementia (AAOx 2 @ baseline), urinary incontinence and depression, presented to [facility] ED for eval of L facial droop, slurred speech and L sided weakness. Pt was found zhen in new onset Afib in ED. Initial non con CTH w/no acute intracranial pathology, multiple chronic B/L infarcts, CTA H/N with B/L Carotid athero L>R. Pt admitted for further stroke w/u and neuro consulted for the same.     Impression: L hemiparesis( L facial and LUE>LLE weakness), mild dysarthria with neuroimaging evidence of acute infarcts in R motor strip and R cerebellum, likely mechanism would be cardioembolic, however has B/L carotid athero L>R.       Reccs:     1)Secondary stroke prevention  - On Eliquis for Afib.   - C/W Atorvastatin 80mg PO daily  - Discuss with cardiology regarding AP plan and AP regimen that would be considered with concurrent anticoagulation- Noted to be on ASA in addition to eliquis.     2) Stroke risk factors  - A1C: 8.4, further BG management per primary team   - LDL: 134, on HIS as above.   - atrial fibrillation  - HTN    3) Further management  - MRI brain without : done, results as above.   - Goal : Gradual Normotension, would not aggressively or quickly lower BP, caution with lowering BP given B/L large vessel athero L>R.   - may need outpt neurology follow up   - Patient can follow up with Vascular neurology at [address] [place] [address], 1-2 weeks after discharge. Please instruct the patient to call [phone]  to schedule this appointment.  - provide stroke education  - PT eval and F/U reccs.     DVT prophylaxis   -On Eliquis  and SCDs    Neuro will sign off at this point of time, please call us back with any further questions or concerns.     Pt seen, eval and Discussed with Neurology Attending Dr. Magana.

## 2025-05-20 NOTE — DISCHARGE NOTE NURSING/CASE MANAGEMENT/SOCIAL WORK - NSDCPEFALRISK_GEN_ALL_CORE
For information on Fall & Injury Prevention, visit: [email or web address] OR  [email or web address] OR  https://www.cdc.gov/steadi/patient.html

## 2025-05-20 NOTE — PROGRESS NOTE ADULT - REASON FOR ADMISSION
Facial droop and slurred speech concerning for Acute CVA
cva
facial droop and slurred speech concerning for acute stroke
Facial droop and slurred speech and acute CVA

## 2025-05-20 NOTE — PROGRESS NOTE ADULT - SUBJECTIVE AND OBJECTIVE BOX
Neurology  Progress Note    INTERVAL HPI/OVERNIGHT EVENTS:  Patient seen and examined @ bedside. Noted to be sitting @ bedside chair having his breakfast. Pleasant and visually attends to the provider. Denies any complaints.     MEDICATIONS  (STANDING):  apixaban 5 milliGRAM(s) Oral every 12 hours  aspirin enteric coated 81 milliGRAM(s) Oral daily  atorvastatin 80 milliGRAM(s) Oral at bedtime  escitalopram 5 milliGRAM(s) Oral daily  insulin lispro (ADMELOG) corrective regimen sliding scale   SubCutaneous three times a day before meals  insulin lispro (ADMELOG) corrective regimen sliding scale   SubCutaneous at bedtime  lisinopril 10 milliGRAM(s) Oral daily  oxybutynin XL 10 milliGRAM(s) Oral at bedtime  pantoprazole    Tablet 40 milliGRAM(s) Oral before breakfast    MEDICATIONS  (PRN):      Allergies  phenothiazines (Hives)  Plavix (Unknown)  Plavix (Hives)      Vital Signs Last 24 Hrs  T(C): 36.3 (20 May 2025 10:56), Max: 36.7 (19 May 2025 15:51)  T(F): 97.3 (20 May 2025 10:56), Max: 98.1 (19 May 2025 15:51)  HR: 56 (20 May 2025 10:56) (49 - 63)  BP: 104/56 (20 May 2025 10:56) (104/56 - 136/70)  BP(mean): 83 (20 May 2025 06:42) (82 - 83)  RR: 18 (20 May 2025 10:56) (17 - 18)  SpO2: 96% (20 May 2025 10:56) (93% - 96%)    Parameters below as of 20 May 2025 10:56  Patient On (Oxygen Delivery Method): room air        Physical exam:  General: No acute distress, awake and alert  Eyes: Anicteric sclerae, moist conjunctivae, see below for CNs  Neck: trachea midline, FROM, supple.   Cardiovascular: Irregular rate and rhythm.    Pulmonary: Respirations appear to be even and non labored.       Neurologic:  -Mental status: Awake, alert, oriented to person ( able to state his name and , age as 69), place (as hospital, doesnt know the name of the hospital), and not oriented to  time ( feb, season as summer). Speech is somewhat fluent with intact naming, repetition, and comprehension, mild dysarthria +. Follows simple and complex commands. Attention/concentration intact.     -Cranial nerves:   II: Visual fields are full to confrontation by finger counting.  III, IV, VI: Extraocular movements are intact without nystagmus. Pupils equally round and reactive to light  V:  Facial sensation V1-V3 equal and intact   VII: L facial droop+.   VIII: Hearing is bilaterally intact grossly.   XII: Tongue protrudes midline  Motor: Normal bulk and tone. LUE subtle pronator drift, 4+/5, orbiting with LUE.   Rapid alternating movements intact and symmetric  Sensation: Intact to light touch bilaterally. No neglect or extinction on double simultaneous testing.  Coordination: No dysmetria on finger-to-nose and heel-to-shin bilaterally  Reflexes: Downgoing toes bilaterally   Gait: Narrow gait and steady    LABS:                        15.5   5.00  )-----------( 191      ( 20 May 2025 07:07 )             44.9     05-20    133[L]  |  104  |  17  ----------------------------<  168[H]  4.2   |  24  |  1.08    Ca    9.6      20 May 2025 07:07  Phos  3.0     05-20  Mg     1.8     05-20    TPro  7.0  /  Alb  3.0[L]  /  TBili  0.6  /  DBili  x   /  AST  15  /  ALT  18  /  AlkPhos  53  05-20      Urinalysis Basic - ( 20 May 2025 07:07 )    Color: x / Appearance: x / SG: x / pH: x  Gluc: 168 mg/dL / Ketone: x  / Bili: x / Urobili: x   Blood: x / Protein: x / Nitrite: x   Leuk Esterase: x / RBC: x / WBC x   Sq Epi: x / Non Sq Epi: x / Bacteria: x        RADIOLOGY & ADDITIONAL TESTS:     ********************TEMPLATE ONLY*******************    Neurology  Progress Note    INTERVAL HPI/OVERNIGHT EVENTS:  Patient seen and examined @ bedside. Noted to be sitting @ bedside chair having his breakfast. Pleasant and visually attends to the provider. Denies any complaints.     MEDICATIONS  (STANDING):  apixaban 5 milliGRAM(s) Oral every 12 hours  aspirin enteric coated 81 milliGRAM(s) Oral daily  atorvastatin 80 milliGRAM(s) Oral at bedtime  escitalopram 5 milliGRAM(s) Oral daily  insulin lispro (ADMELOG) corrective regimen sliding scale   SubCutaneous three times a day before meals  insulin lispro (ADMELOG) corrective regimen sliding scale   SubCutaneous at bedtime  lisinopril 10 milliGRAM(s) Oral daily  oxybutynin XL 10 milliGRAM(s) Oral at bedtime  pantoprazole    Tablet 40 milliGRAM(s) Oral before breakfast    MEDICATIONS  (PRN):      Allergies  phenothiazines (Hives)  Plavix (Unknown)  Plavix (Hives)      Vital Signs Last 24 Hrs  T(C): 36.3 (20 May 2025 10:56), Max: 36.7 (19 May 2025 15:51)  T(F): 97.3 (20 May 2025 10:56), Max: 98.1 (19 May 2025 15:51)  HR: 56 (20 May 2025 10:56) (49 - 63)  BP: 104/56 (20 May 2025 10:56) (104/56 - 136/70)  BP(mean): 83 (20 May 2025 06:42) (82 - 83)  RR: 18 (20 May 2025 10:56) (17 - 18)  SpO2: 96% (20 May 2025 10:56) (93% - 96%)    Parameters below as of 20 May 2025 10:56  Patient On (Oxygen Delivery Method): room air        Physical exam:  General: No acute distress, awake and alert  Eyes: Anicteric sclerae, moist conjunctivae, see below for CNs  Neck: trachea midline, FROM, supple.   Cardiovascular: Irregular rate and rhythm.    Pulmonary: Respirations appear to be even and non labored.       Neurologic:  -Mental status: Awake, alert, oriented to person ( able to state his name and , age as 69), place (as hospital, doesnt know the name of the hospital), and not oriented to  time ( feb, season as summer). Speech is somewhat fluent with intact naming, repetition, and comprehension, mild dysarthria +. Follows simple and complex commands. Attention/concentration intact.     -Cranial nerves:   II: Visual fields are full to confrontation by finger counting.  III, IV, VI: Extraocular movements are intact without nystagmus. Pupils equally round and reactive to light  V:  Facial sensation V1-V3 equal and intact   VII: L facial droop+.   VIII: Hearing is bilaterally intact grossly.   XII: Tongue protrudes midline  Motor: Normal bulk and tone. LUE subtle pronator drift, 4+/5, orbiting with LUE.   Rapid alternating movements intact and symmetric  Sensation: Intact to light touch bilaterally. No neglect or extinction on double simultaneous testing.  Coordination: No dysmetria on finger-to-nose and heel-to-shin bilaterally  Reflexes: Downgoing toes bilaterally   Gait: Narrow gait and steady    LABS:                        15.5   5.00  )-----------( 191      ( 20 May 2025 07:07 )             44.9     05-20    133[L]  |  104  |  17  ----------------------------<  168[H]  4.2   |  24  |  1.08    Ca    9.6      20 May 2025 07:07  Phos  3.0     05-20  Mg     1.8     05-20    TPro  7.0  /  Alb  3.0[L]  /  TBili  0.6  /  DBili  x   /  AST  15  /  ALT  18  /  AlkPhos  53  05-20      Urinalysis Basic - ( 20 May 2025 07:07 )    Color: x / Appearance: x / SG: x / pH: x  Gluc: 168 mg/dL / Ketone: x  / Bili: x / Urobili: x   Blood: x / Protein: x / Nitrite: x   Leuk Esterase: x / RBC: x / WBC x   Sq Epi: x / Non Sq Epi: x / Bacteria: x        RADIOLOGY & ADDITIONAL TESTS:     ********************TEMPLATE ONLY*******************    Neurology  Progress Note    INTERVAL HPI/OVERNIGHT EVENTS:  Patient seen and examined @ bedside. Noted to be sitting @ bedside chair having his breakfast. Pleasant and visually attends to the provider. Denies any complaints.     MEDICATIONS  (STANDING):  apixaban 5 milliGRAM(s) Oral every 12 hours  aspirin enteric coated 81 milliGRAM(s) Oral daily  atorvastatin 80 milliGRAM(s) Oral at bedtime  escitalopram 5 milliGRAM(s) Oral daily  insulin lispro (ADMELOG) corrective regimen sliding scale   SubCutaneous three times a day before meals  insulin lispro (ADMELOG) corrective regimen sliding scale   SubCutaneous at bedtime  lisinopril 10 milliGRAM(s) Oral daily  oxybutynin XL 10 milliGRAM(s) Oral at bedtime  pantoprazole    Tablet 40 milliGRAM(s) Oral before breakfast    MEDICATIONS  (PRN):      Allergies  phenothiazines (Hives)  Plavix (Unknown)  Plavix (Hives)      Vital Signs Last 24 Hrs  T(C): 36.3 (20 May 2025 10:56), Max: 36.7 (19 May 2025 15:51)  T(F): 97.3 (20 May 2025 10:56), Max: 98.1 (19 May 2025 15:51)  HR: 56 (20 May 2025 10:56) (49 - 63)  BP: 104/56 (20 May 2025 10:56) (104/56 - 136/70)  BP(mean): 83 (20 May 2025 06:42) (82 - 83)  RR: 18 (20 May 2025 10:56) (17 - 18)  SpO2: 96% (20 May 2025 10:56) (93% - 96%)    Parameters below as of 20 May 2025 10:56  Patient On (Oxygen Delivery Method): room air        Physical exam:  General: No acute distress, awake and alert  Eyes: Anicteric sclerae, moist conjunctivae, see below for CNs  Neck: trachea midline, FROM, supple.   Cardiovascular: Irregular rate and rhythm.    Pulmonary: Respirations appear to be even and non labored.       Neurologic:  -Mental status: Awake, alert, oriented to person ( able to state his name and , age as 69), place (as hospital, doesnt know the name of the hospital), and not oriented to  time ( feb, season as summer). Speech is somewhat fluent with intact naming, repetition, and comprehension, mild dysarthria +. Follows simple and complex commands. Attention/concentration intact.     -Cranial nerves:   II: Visual fields are full to confrontation by finger counting.  III, IV, VI: Extraocular movements are intact without nystagmus. Pupils equally round and reactive to light  V:  Facial sensation V1-V3 equal and intact   VII: L facial droop+.   VIII: Hearing is bilaterally intact grossly.   XII: Tongue protrudes midline  Motor: Normal bulk and tone. LUE subtle pronator drift+, grossly 4+/5, orbiting with LUE. LLE 5-/5, RUE/RLE : 5/5  Rapid alternating movements intact and symmetric  Sensation: Intact to light touch bilaterally. No neglect or extinction on double simultaneous testing.  Coordination: No dysmetria on finger-to-nose and heel-to-shin bilaterally  Reflexes: Downgoing toes bilaterally   Gait: Narrow gait and steady    NIHSS: 4(LOC 1+, L facial 2+, LUE drift 1+)  mRS:     LABS:                        15.5   5.00  )-----------( 191      ( 20 May 2025 07:07 )             44.9     05-20    133[L]  |  104  |  17  ----------------------------<  168[H]  4.2   |  24  |  1.08    Ca    9.6      20 May 2025 07:07  Phos  3.0     05-20  Mg     1.8     05-20    TPro  7.0  /  Alb  3.0[L]  /  TBili  0.6  /  DBili  x   /  AST  15  /  ALT  18  /  AlkPhos  53  05-20      Urinalysis Basic - ( 20 May 2025 07:07 )    Color: x / Appearance: x / SG: x / pH: x  Gluc: 168 mg/dL / Ketone: x  / Bili: x / Urobili: x   Blood: x / Protein: x / Nitrite: x   Leuk Esterase: x / RBC: x / WBC x   Sq Epi: x / Non Sq Epi: x / Bacteria: x        RADIOLOGY & ADDITIONAL TESTS:     Neurology  Progress Note    INTERVAL HPI/OVERNIGHT EVENTS:  Patient seen and examined @ bedside. Noted to be sitting @ bedside chair having his breakfast. Pleasant and visually attends to the provider. Denies any complaints.     MEDICATIONS  (STANDING):  apixaban 5 milliGRAM(s) Oral every 12 hours  aspirin enteric coated 81 milliGRAM(s) Oral daily  atorvastatin 80 milliGRAM(s) Oral at bedtime  escitalopram 5 milliGRAM(s) Oral daily  insulin lispro (ADMELOG) corrective regimen sliding scale   SubCutaneous three times a day before meals  insulin lispro (ADMELOG) corrective regimen sliding scale   SubCutaneous at bedtime  lisinopril 10 milliGRAM(s) Oral daily  oxybutynin XL 10 milliGRAM(s) Oral at bedtime  pantoprazole    Tablet 40 milliGRAM(s) Oral before breakfast    MEDICATIONS  (PRN):      Allergies  phenothiazines (Hives)  Plavix (Unknown)  Plavix (Hives)      Vital Signs Last 24 Hrs  T(C): 36.3 (20 May 2025 10:56), Max: 36.7 (19 May 2025 15:51)  T(F): 97.3 (20 May 2025 10:56), Max: 98.1 (19 May 2025 15:51)  HR: 56 (20 May 2025 10:56) (49 - 63)  BP: 104/56 (20 May 2025 10:56) (104/56 - 136/70)  BP(mean): 83 (20 May 2025 06:42) (82 - 83)  RR: 18 (20 May 2025 10:56) (17 - 18)  SpO2: 96% (20 May 2025 10:56) (93% - 96%)    Parameters below as of 20 May 2025 10:56  Patient On (Oxygen Delivery Method): room air        Physical exam:  General: No acute distress, awake and alert  Eyes: Anicteric sclerae, moist conjunctivae, see below for CNs  Neck: trachea midline, FROM, supple.   Cardiovascular: Irregular rate and rhythm.    Pulmonary: Respirations appear to be even and non labored.       Neurologic:  -Mental status: Awake, alert, oriented to person ( able to state his name and , age as 69), place (as hospital, doesnt know the name of the hospital), and not oriented to  time ( feb, season as summer). Speech is somewhat fluent with intact naming, repetition, and comprehension, mild dysarthria +. Follows simple and complex commands. Attention/concentration intact.     -Cranial nerves:   II: Visual fields are full to confrontation by finger counting.  III, IV, VI: Extraocular movements are intact without nystagmus. Pupils equally round and reactive to light  V:  Facial sensation V1-V3 equal and intact   VII: L facial droop+.   VIII: Hearing is bilaterally intact grossly.   XII: Tongue protrudes midline  Motor: Normal bulk and tone. LUE subtle pronator drift+, grossly 4+/5, orbiting with LUE. LLE 5-/5, RUE/RLE : 5/5  Rapid alternating movements intact and symmetric  Sensation: Intact to light touch bilaterally. No neglect or extinction on double simultaneous testing.  Coordination: No dysmetria on finger-to-nose and heel-to-shin bilaterally  Reflexes: Downgoing toes bilaterally   Gait: Narrow gait and steady    NIHSS: 4(LOC 1+, L facial 2+, LUE drift 1+)  mRS:     LABS:                        15.5   5.00  )-----------( 191      ( 20 May 2025 07:07 )             44.9     05-20    133[L]  |  104  |  17  ----------------------------<  168[H]  4.2   |  24  |  1.08    Ca    9.6      20 May 2025 07:07  Phos  3.0     05-20  Mg     1.8     05-20    TPro  7.0  /  Alb  3.0[L]  /  TBili  0.6  /  DBili  x   /  AST  15  /  ALT  18  /  AlkPhos  53  05-20      LDL Cholesterol Calculated: 134 mg/dL (25 @ 06:40)    A1C with Estimated Average Glucose Result: 8.4          RADIOLOGY & ADDITIONAL TESTS:    MR Head No Cont (25 @ 16:26)   IMPRESSION:  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 Exclude embolic disease..    CT Brain Stroke Protocol (05.15.25 @ 16:19)   IMPRESSION:      1. The CT study of the brain demonstrates multiple old infarcts along   with generalized small vessel ischemic changes. There infarcts in both   MCA territories more extensive on the right.  2. The perfusion study suggests an area of penumbra surrounding the right   frontal MCA infarct, raising the possibility of new ischemia. Also there   are areas of hypoperfusion in the left temporal lobe as well as bilateral   frontal white matter.  3. In the neck, there is narrowing of both carotidbifurcations but   calcified plaque, more extensive on the left. Approximately 50-60%   narrowing suggested in the proximal left internal carotid. Approximately   30% narrowing suggested of the right internal carotid. The left vertebral   is dominant with a hypoplastic right vertebral. However there is   extensive calcified plaque at the left vertebral origin, as well as in   the left V4 segment.  4. The intracerebral vasculature appears to be patent, as outlined above.    TTE W or WO Ultrasound Enhancing Agent (25 @ 14:19)   CONCLUSIONS:      1. Technically difficult image quality.   2. Left atrium is mildly dilated.   3. Left ventricular systolic function is moderately decreased with an ejection fraction visually estimated at 35 to 40 %.   4. There is increased LV mass and eccentric hypertrophy.   5. There is moderate (grade 2) left ventricular diastolic dysfunction.   6. Normal right ventricular cavity size and probably normal right ventricular systolic function.

## 2025-05-20 NOTE — DISCHARGE NOTE NURSING/CASE MANAGEMENT/SOCIAL WORK - PATIENT PORTAL LINK FT
You can access the FollowMyHealth Patient Portal offered by [facility] by registering at the following website: [email or web address]. By joining [facility]’s FollowMyHealth portal, you will also be able to view your health information using other applications (apps) compatible with our system.

## 2025-05-20 NOTE — PROGRESS NOTE ADULT - PROVIDER SPECIALTY LIST ADULT
Internal Medicine
Neurology
Cardiology
Cardiology
Hospitalist
Cardiology

## 2025-05-20 NOTE — CHART NOTE - NSCHARTNOTEFT_GEN_A_CORE
I attempted to call PCP, no answer. There was no opportunity for message to be left, was instructed to call back during business hours.

## 2025-05-20 NOTE — DISCHARGE NOTE NURSING/CASE MANAGEMENT/SOCIAL WORK - FINANCIAL ASSISTANCE
[facility] provides services at a reduced cost to those who are determined to be eligible through [facility]’s financial assistance program. Information regarding [facility]’s financial assistance program can be found by going to [email or web address] or by calling [phone].

## 2025-06-30 PROBLEM — E11.9 TYPE 2 DIABETES MELLITUS WITHOUT COMPLICATIONS: Chronic | Status: ACTIVE | Noted: 2025-05-15

## 2025-06-30 PROBLEM — I10 ESSENTIAL (PRIMARY) HYPERTENSION: Chronic | Status: ACTIVE | Noted: 2025-05-15

## 2025-06-30 PROBLEM — I25.10 ATHEROSCLEROTIC HEART DISEASE OF NATIVE CORONARY ARTERY WITHOUT ANGINA PECTORIS: Chronic | Status: ACTIVE | Noted: 2025-05-15

## 2025-06-30 PROBLEM — R32 UNSPECIFIED URINARY INCONTINENCE: Chronic | Status: ACTIVE | Noted: 2025-05-15

## 2025-06-30 PROBLEM — E78.5 HYPERLIPIDEMIA, UNSPECIFIED: Chronic | Status: ACTIVE | Noted: 2025-05-15

## 2025-06-30 PROBLEM — F32.9 MAJOR DEPRESSIVE DISORDER, SINGLE EPISODE, UNSPECIFIED: Chronic | Status: ACTIVE | Noted: 2025-05-15

## 2025-07-09 ENCOUNTER — APPOINTMENT (OUTPATIENT)
Dept: PULMONOLOGY | Facility: CLINIC | Age: 80
End: 2025-07-09
Payer: MEDICARE

## 2025-07-09 VITALS
OXYGEN SATURATION: 96 % | HEART RATE: 88 BPM | BODY MASS INDEX: 29.02 KG/M2 | HEIGHT: 64 IN | SYSTOLIC BLOOD PRESSURE: 125 MMHG | TEMPERATURE: 98 F | WEIGHT: 170 LBS | DIASTOLIC BLOOD PRESSURE: 74 MMHG

## 2025-07-09 PROBLEM — R06.09 DOE (DYSPNEA ON EXERTION): Status: ACTIVE | Noted: 2025-07-09

## 2025-07-09 PROBLEM — R93.89 ABNORMAL CHEST X-RAY: Status: ACTIVE | Noted: 2025-07-09

## 2025-07-09 PROCEDURE — 99205 OFFICE O/P NEW HI 60 MIN: CPT | Mod: 25

## 2025-07-09 PROCEDURE — 94010 BREATHING CAPACITY TEST: CPT

## 2025-07-09 PROCEDURE — 71046 X-RAY EXAM CHEST 2 VIEWS: CPT

## 2025-07-09 RX ORDER — FUROSEMIDE 20 MG/1
20 TABLET ORAL
Refills: 0 | Status: ACTIVE | COMMUNITY

## 2025-07-09 RX ORDER — METOPROLOL SUCCINATE 25 MG/1
25 TABLET, EXTENDED RELEASE ORAL
Refills: 0 | Status: ACTIVE | COMMUNITY

## 2025-07-09 RX ORDER — APIXABAN 5 MG/1
5 TABLET, FILM COATED ORAL
Refills: 0 | Status: ACTIVE | COMMUNITY

## 2025-07-09 RX ORDER — PIOGLITAZONE HYDROCHLORIDE 45 MG/1
45 TABLET ORAL
Refills: 0 | Status: ACTIVE | COMMUNITY

## 2025-07-09 RX ORDER — FENOFIBRATE 160 MG/1
160 TABLET ORAL
Refills: 0 | Status: ACTIVE | COMMUNITY

## 2025-07-09 RX ORDER — ATORVASTATIN CALCIUM 80 MG/1
80 TABLET, FILM COATED ORAL
Refills: 0 | Status: ACTIVE | COMMUNITY

## 2025-07-09 RX ORDER — GLIPIZIDE 10 MG/1
10 TABLET ORAL
Refills: 0 | Status: ACTIVE | COMMUNITY

## 2025-07-10 ENCOUNTER — APPOINTMENT (OUTPATIENT)
Dept: NEUROLOGY | Facility: CLINIC | Age: 80
End: 2025-07-10
Payer: MEDICARE

## 2025-07-10 VITALS
SYSTOLIC BLOOD PRESSURE: 118 MMHG | HEIGHT: 64 IN | HEART RATE: 58 BPM | WEIGHT: 170 LBS | DIASTOLIC BLOOD PRESSURE: 73 MMHG | BODY MASS INDEX: 29.02 KG/M2

## 2025-07-10 PROCEDURE — 99213 OFFICE O/P EST LOW 20 MIN: CPT

## 2025-07-23 ENCOUNTER — NON-APPOINTMENT (OUTPATIENT)
Age: 80
End: 2025-07-23

## 2025-07-29 ENCOUNTER — INPATIENT (INPATIENT)
Facility: HOSPITAL | Age: 80
LOS: 0 days | Discharge: HOME | DRG: 603 | End: 2025-07-30
Attending: INTERNAL MEDICINE | Admitting: INTERNAL MEDICINE
Payer: MEDICARE

## 2025-07-29 VITALS
TEMPERATURE: 98 F | DIASTOLIC BLOOD PRESSURE: 72 MMHG | HEART RATE: 76 BPM | RESPIRATION RATE: 18 BRPM | HEIGHT: 65 IN | WEIGHT: 188.27 LBS | OXYGEN SATURATION: 100 % | SYSTOLIC BLOOD PRESSURE: 144 MMHG

## 2025-07-29 DIAGNOSIS — Z95.5 PRESENCE OF CORONARY ANGIOPLASTY IMPLANT AND GRAFT: Chronic | ICD-10-CM

## 2025-07-29 DIAGNOSIS — Z98.890 OTHER SPECIFIED POSTPROCEDURAL STATES: Chronic | ICD-10-CM

## 2025-07-29 DIAGNOSIS — Z95.1 PRESENCE OF AORTOCORONARY BYPASS GRAFT: Chronic | ICD-10-CM

## 2025-07-29 DIAGNOSIS — L03.90 CELLULITIS, UNSPECIFIED: ICD-10-CM

## 2025-07-29 LAB
ALBUMIN SERPL ELPH-MCNC: 3.6 G/DL — SIGNIFICANT CHANGE UP (ref 3.5–5)
ALP SERPL-CCNC: 55 U/L — SIGNIFICANT CHANGE UP (ref 40–120)
ALT FLD-CCNC: 15 U/L DA — SIGNIFICANT CHANGE UP (ref 10–60)
ANION GAP SERPL CALC-SCNC: 1 MMOL/L — LOW (ref 5–17)
APTT BLD: 34.4 SEC — SIGNIFICANT CHANGE UP (ref 26.1–36.8)
AST SERPL-CCNC: 14 U/L — SIGNIFICANT CHANGE UP (ref 10–40)
BASOPHILS # BLD AUTO: 0.03 K/UL — SIGNIFICANT CHANGE UP (ref 0–0.2)
BASOPHILS NFR BLD AUTO: 0.5 % — SIGNIFICANT CHANGE UP (ref 0–2)
BILIRUB SERPL-MCNC: 0.4 MG/DL — SIGNIFICANT CHANGE UP (ref 0.2–1.2)
BUN SERPL-MCNC: 26 MG/DL — HIGH (ref 7–18)
CALCIUM SERPL-MCNC: 9.9 MG/DL — SIGNIFICANT CHANGE UP (ref 8.4–10.5)
CHLORIDE SERPL-SCNC: 104 MMOL/L — SIGNIFICANT CHANGE UP (ref 96–108)
CO2 SERPL-SCNC: 31 MMOL/L — SIGNIFICANT CHANGE UP (ref 22–31)
CREAT SERPL-MCNC: 1.46 MG/DL — HIGH (ref 0.5–1.3)
CRP SERPL-MCNC: 5.1 MG/L — HIGH (ref 0–5)
EGFR: 49 ML/MIN/1.73M2 — LOW
EGFR: 49 ML/MIN/1.73M2 — LOW
EOSINOPHIL # BLD AUTO: 0.21 K/UL — SIGNIFICANT CHANGE UP (ref 0–0.5)
EOSINOPHIL NFR BLD AUTO: 3.6 % — SIGNIFICANT CHANGE UP (ref 0–6)
ERYTHROCYTE [SEDIMENTATION RATE] IN BLOOD: 23 MM/HR — HIGH (ref 0–15)
GLUCOSE SERPL-MCNC: 111 MG/DL — HIGH (ref 70–99)
HCT VFR BLD CALC: 43.1 % — SIGNIFICANT CHANGE UP (ref 39–50)
HGB BLD-MCNC: 14.4 G/DL — SIGNIFICANT CHANGE UP (ref 13–17)
IMM GRANULOCYTES NFR BLD AUTO: 0.2 % — SIGNIFICANT CHANGE UP (ref 0–0.9)
INR BLD: 1.28 RATIO — HIGH (ref 0.85–1.16)
LYMPHOCYTES # BLD AUTO: 1.74 K/UL — SIGNIFICANT CHANGE UP (ref 1–3.3)
LYMPHOCYTES # BLD AUTO: 29.6 % — SIGNIFICANT CHANGE UP (ref 13–44)
MCHC RBC-ENTMCNC: 28.9 PG — SIGNIFICANT CHANGE UP (ref 27–34)
MCHC RBC-ENTMCNC: 33.4 G/DL — SIGNIFICANT CHANGE UP (ref 32–36)
MCV RBC AUTO: 86.4 FL — SIGNIFICANT CHANGE UP (ref 80–100)
MONOCYTES # BLD AUTO: 0.5 K/UL — SIGNIFICANT CHANGE UP (ref 0–0.9)
MONOCYTES NFR BLD AUTO: 8.5 % — SIGNIFICANT CHANGE UP (ref 2–14)
NEUTROPHILS # BLD AUTO: 3.39 K/UL — SIGNIFICANT CHANGE UP (ref 1.8–7.4)
NEUTROPHILS NFR BLD AUTO: 57.6 % — SIGNIFICANT CHANGE UP (ref 43–77)
NRBC BLD AUTO-RTO: 0 /100 WBCS — SIGNIFICANT CHANGE UP (ref 0–0)
PLATELET # BLD AUTO: 212 K/UL — SIGNIFICANT CHANGE UP (ref 150–400)
POTASSIUM SERPL-MCNC: 4.2 MMOL/L — SIGNIFICANT CHANGE UP (ref 3.5–5.3)
POTASSIUM SERPL-SCNC: 4.2 MMOL/L — SIGNIFICANT CHANGE UP (ref 3.5–5.3)
PROCALCITONIN SERPL-MCNC: 0.08 NG/ML — SIGNIFICANT CHANGE UP (ref 0.02–0.1)
PROT SERPL-MCNC: 7.8 G/DL — SIGNIFICANT CHANGE UP (ref 6–8.3)
PROTHROM AB SERPL-ACNC: 14.8 SEC — HIGH (ref 9.9–13.4)
RBC # BLD: 4.99 M/UL — SIGNIFICANT CHANGE UP (ref 4.2–5.8)
RBC # FLD: 14.1 % — SIGNIFICANT CHANGE UP (ref 10.3–14.5)
SODIUM SERPL-SCNC: 136 MMOL/L — SIGNIFICANT CHANGE UP (ref 135–145)
WBC # BLD: 5.88 K/UL — SIGNIFICANT CHANGE UP (ref 3.8–10.5)
WBC # FLD AUTO: 5.88 K/UL — SIGNIFICANT CHANGE UP (ref 3.8–10.5)

## 2025-07-29 PROCEDURE — 85610 PROTHROMBIN TIME: CPT

## 2025-07-29 PROCEDURE — 73140 X-RAY EXAM OF FINGER(S): CPT

## 2025-07-29 PROCEDURE — 73120 X-RAY EXAM OF HAND: CPT | Mod: 26,RT

## 2025-07-29 PROCEDURE — 99285 EMERGENCY DEPT VISIT HI MDM: CPT

## 2025-07-29 PROCEDURE — 80053 COMPREHEN METABOLIC PANEL: CPT

## 2025-07-29 PROCEDURE — 82962 GLUCOSE BLOOD TEST: CPT

## 2025-07-29 PROCEDURE — 85652 RBC SED RATE AUTOMATED: CPT

## 2025-07-29 PROCEDURE — 85025 COMPLETE CBC W/AUTO DIFF WBC: CPT

## 2025-07-29 PROCEDURE — 87040 BLOOD CULTURE FOR BACTERIA: CPT

## 2025-07-29 PROCEDURE — 73140 X-RAY EXAM OF FINGER(S): CPT | Mod: 26,XE,RT

## 2025-07-29 PROCEDURE — 86140 C-REACTIVE PROTEIN: CPT

## 2025-07-29 PROCEDURE — 73120 X-RAY EXAM OF HAND: CPT

## 2025-07-29 PROCEDURE — 85730 THROMBOPLASTIN TIME PARTIAL: CPT

## 2025-07-29 PROCEDURE — 36415 COLL VENOUS BLD VENIPUNCTURE: CPT

## 2025-07-29 RX ORDER — PIPERACILLIN-TAZO-DEXTROSE,ISO 3.375G/5
3.38 IV SOLUTION, PIGGYBACK PREMIX FROZEN(ML) INTRAVENOUS ONCE
Refills: 0 | Status: COMPLETED | OUTPATIENT
Start: 2025-07-29 | End: 2025-07-29

## 2025-07-29 RX ORDER — ENOXAPARIN SODIUM 100 MG/ML
40 INJECTION SUBCUTANEOUS EVERY 24 HOURS
Refills: 0 | Status: DISCONTINUED | OUTPATIENT
Start: 2025-07-29 | End: 2025-07-30

## 2025-07-29 RX ORDER — VANCOMYCIN HCL IN 5 % DEXTROSE 1.5G/250ML
1000 PLASTIC BAG, INJECTION (ML) INTRAVENOUS ONCE
Refills: 0 | Status: COMPLETED | OUTPATIENT
Start: 2025-07-29 | End: 2025-07-29

## 2025-07-29 RX ADMIN — Medication 3.38 GRAM(S): at 23:51

## 2025-07-29 RX ADMIN — Medication 200 GRAM(S): at 17:51

## 2025-07-29 RX ADMIN — Medication 500 MILLILITER(S): at 17:49

## 2025-07-29 RX ADMIN — Medication 500 MILLILITER(S): at 23:51

## 2025-07-29 RX ADMIN — Medication 250 MILLIGRAM(S): at 19:51

## 2025-07-30 ENCOUNTER — TRANSCRIPTION ENCOUNTER (OUTPATIENT)
Age: 80
End: 2025-07-30

## 2025-07-30 VITALS
TEMPERATURE: 98 F | OXYGEN SATURATION: 97 % | DIASTOLIC BLOOD PRESSURE: 51 MMHG | HEART RATE: 65 BPM | SYSTOLIC BLOOD PRESSURE: 108 MMHG | RESPIRATION RATE: 18 BRPM

## 2025-07-30 DIAGNOSIS — I25.10 ATHEROSCLEROTIC HEART DISEASE OF NATIVE CORONARY ARTERY WITHOUT ANGINA PECTORIS: ICD-10-CM

## 2025-07-30 DIAGNOSIS — E78.5 HYPERLIPIDEMIA, UNSPECIFIED: ICD-10-CM

## 2025-07-30 DIAGNOSIS — Z29.9 ENCOUNTER FOR PROPHYLACTIC MEASURES, UNSPECIFIED: ICD-10-CM

## 2025-07-30 DIAGNOSIS — I10 ESSENTIAL (PRIMARY) HYPERTENSION: ICD-10-CM

## 2025-07-30 DIAGNOSIS — F03.90 UNSPECIFIED DEMENTIA, UNSPECIFIED SEVERITY, WITHOUT BEHAVIORAL DISTURBANCE, PSYCHOTIC DISTURBANCE, MOOD DISTURBANCE, AND ANXIETY: ICD-10-CM

## 2025-07-30 DIAGNOSIS — I50.22 CHRONIC SYSTOLIC (CONGESTIVE) HEART FAILURE: ICD-10-CM

## 2025-07-30 DIAGNOSIS — E11.9 TYPE 2 DIABETES MELLITUS WITHOUT COMPLICATIONS: ICD-10-CM

## 2025-07-30 DIAGNOSIS — I63.9 CEREBRAL INFARCTION, UNSPECIFIED: ICD-10-CM

## 2025-07-30 DIAGNOSIS — L03.90 CELLULITIS, UNSPECIFIED: ICD-10-CM

## 2025-07-30 LAB
ALBUMIN SERPL ELPH-MCNC: 3 G/DL — LOW (ref 3.5–5)
ALP SERPL-CCNC: 38 U/L — LOW (ref 40–120)
ALT FLD-CCNC: 14 U/L DA — SIGNIFICANT CHANGE UP (ref 10–60)
ANION GAP SERPL CALC-SCNC: 4 MMOL/L — LOW (ref 5–17)
AST SERPL-CCNC: 18 U/L — SIGNIFICANT CHANGE UP (ref 10–40)
BILIRUB SERPL-MCNC: 0.5 MG/DL — SIGNIFICANT CHANGE UP (ref 0.2–1.2)
BUN SERPL-MCNC: 22 MG/DL — HIGH (ref 7–18)
CALCIUM SERPL-MCNC: 9 MG/DL — SIGNIFICANT CHANGE UP (ref 8.4–10.5)
CHLORIDE SERPL-SCNC: 103 MMOL/L — SIGNIFICANT CHANGE UP (ref 96–108)
CO2 SERPL-SCNC: 27 MMOL/L — SIGNIFICANT CHANGE UP (ref 22–31)
CREAT SERPL-MCNC: 1.33 MG/DL — HIGH (ref 0.5–1.3)
CRP SERPL-MCNC: 3.2 MG/L — SIGNIFICANT CHANGE UP (ref 0–5)
EGFR: 55 ML/MIN/1.73M2 — LOW
EGFR: 55 ML/MIN/1.73M2 — LOW
ERYTHROCYTE [SEDIMENTATION RATE] IN BLOOD: 21 MM/HR — HIGH (ref 0–15)
GLUCOSE BLDC GLUCOMTR-MCNC: 147 MG/DL — HIGH (ref 70–99)
GLUCOSE BLDC GLUCOMTR-MCNC: 160 MG/DL — HIGH (ref 70–99)
GLUCOSE SERPL-MCNC: 156 MG/DL — HIGH (ref 70–99)
HCT VFR BLD CALC: 40.6 % — SIGNIFICANT CHANGE UP (ref 39–50)
HGB BLD-MCNC: 13.8 G/DL — SIGNIFICANT CHANGE UP (ref 13–17)
MAGNESIUM SERPL-MCNC: 1.7 MG/DL — SIGNIFICANT CHANGE UP (ref 1.6–2.6)
MCHC RBC-ENTMCNC: 29 PG — SIGNIFICANT CHANGE UP (ref 27–34)
MCHC RBC-ENTMCNC: 34 G/DL — SIGNIFICANT CHANGE UP (ref 32–36)
MCV RBC AUTO: 85.3 FL — SIGNIFICANT CHANGE UP (ref 80–100)
NRBC BLD AUTO-RTO: 0 /100 WBCS — SIGNIFICANT CHANGE UP (ref 0–0)
PHOSPHATE SERPL-MCNC: 3.4 MG/DL — SIGNIFICANT CHANGE UP (ref 2.5–4.5)
PLATELET # BLD AUTO: 182 K/UL — SIGNIFICANT CHANGE UP (ref 150–400)
POTASSIUM SERPL-MCNC: 3.7 MMOL/L — SIGNIFICANT CHANGE UP (ref 3.5–5.3)
POTASSIUM SERPL-SCNC: 3.7 MMOL/L — SIGNIFICANT CHANGE UP (ref 3.5–5.3)
PROT SERPL-MCNC: 6.9 G/DL — SIGNIFICANT CHANGE UP (ref 6–8.3)
RBC # BLD: 4.76 M/UL — SIGNIFICANT CHANGE UP (ref 4.2–5.8)
RBC # FLD: 14 % — SIGNIFICANT CHANGE UP (ref 10.3–14.5)
SODIUM SERPL-SCNC: 134 MMOL/L — LOW (ref 135–145)
WBC # BLD: 5.4 K/UL — SIGNIFICANT CHANGE UP (ref 3.8–10.5)
WBC # FLD AUTO: 5.4 K/UL — SIGNIFICANT CHANGE UP (ref 3.8–10.5)

## 2025-07-30 PROCEDURE — 96365 THER/PROPH/DIAG IV INF INIT: CPT

## 2025-07-30 PROCEDURE — 84145 PROCALCITONIN (PCT): CPT

## 2025-07-30 PROCEDURE — 82962 GLUCOSE BLOOD TEST: CPT

## 2025-07-30 PROCEDURE — 87040 BLOOD CULTURE FOR BACTERIA: CPT

## 2025-07-30 PROCEDURE — 85610 PROTHROMBIN TIME: CPT

## 2025-07-30 PROCEDURE — 99285 EMERGENCY DEPT VISIT HI MDM: CPT | Mod: 25

## 2025-07-30 PROCEDURE — 80053 COMPREHEN METABOLIC PANEL: CPT

## 2025-07-30 PROCEDURE — 84100 ASSAY OF PHOSPHORUS: CPT

## 2025-07-30 PROCEDURE — 86140 C-REACTIVE PROTEIN: CPT

## 2025-07-30 PROCEDURE — 85025 COMPLETE CBC W/AUTO DIFF WBC: CPT

## 2025-07-30 PROCEDURE — 36415 COLL VENOUS BLD VENIPUNCTURE: CPT

## 2025-07-30 PROCEDURE — 73120 X-RAY EXAM OF HAND: CPT

## 2025-07-30 PROCEDURE — 85730 THROMBOPLASTIN TIME PARTIAL: CPT

## 2025-07-30 PROCEDURE — 83735 ASSAY OF MAGNESIUM: CPT

## 2025-07-30 PROCEDURE — 85027 COMPLETE CBC AUTOMATED: CPT

## 2025-07-30 PROCEDURE — 73140 X-RAY EXAM OF FINGER(S): CPT

## 2025-07-30 PROCEDURE — 85652 RBC SED RATE AUTOMATED: CPT

## 2025-07-30 PROCEDURE — 93005 ELECTROCARDIOGRAM TRACING: CPT

## 2025-07-30 RX ORDER — METOPROLOL SUCCINATE 50 MG/1
12.5 TABLET, EXTENDED RELEASE ORAL DAILY
Refills: 0 | Status: DISCONTINUED | OUTPATIENT
Start: 2025-07-30 | End: 2025-07-30

## 2025-07-30 RX ORDER — DEXTROSE 50 % IN WATER 50 %
12.5 SYRINGE (ML) INTRAVENOUS ONCE
Refills: 0 | Status: DISCONTINUED | OUTPATIENT
Start: 2025-07-30 | End: 2025-07-30

## 2025-07-30 RX ORDER — PIOGLITAZONE HYDROCHLORIDE 15 MG/1
1 TABLET ORAL
Refills: 0 | DISCHARGE

## 2025-07-30 RX ORDER — FENOFIBRATE 160 MG/1
145 TABLET ORAL AT BEDTIME
Refills: 0 | Status: DISCONTINUED | OUTPATIENT
Start: 2025-07-30 | End: 2025-07-30

## 2025-07-30 RX ORDER — DEXTROSE 50 % IN WATER 50 %
25 SYRINGE (ML) INTRAVENOUS ONCE
Refills: 0 | Status: DISCONTINUED | OUTPATIENT
Start: 2025-07-30 | End: 2025-07-30

## 2025-07-30 RX ORDER — INSULIN LISPRO 100 U/ML
INJECTION, SOLUTION INTRAVENOUS; SUBCUTANEOUS
Refills: 0 | Status: DISCONTINUED | OUTPATIENT
Start: 2025-07-30 | End: 2025-07-30

## 2025-07-30 RX ORDER — DONEPEZIL HYDROCHLORIDE 5 MG/1
1 TABLET ORAL
Refills: 0 | DISCHARGE

## 2025-07-30 RX ORDER — CEPHALEXIN 250 MG/1
1 CAPSULE ORAL
Qty: 20 | Refills: 0
Start: 2025-07-30 | End: 2025-08-08

## 2025-07-30 RX ORDER — ESCITALOPRAM OXALATE 20 MG/1
5 TABLET ORAL DAILY
Refills: 0 | Status: DISCONTINUED | OUTPATIENT
Start: 2025-07-30 | End: 2025-07-30

## 2025-07-30 RX ORDER — OXYBUTYNIN CHLORIDE 5 MG/1
10 TABLET, FILM COATED, EXTENDED RELEASE ORAL DAILY
Refills: 0 | Status: DISCONTINUED | OUTPATIENT
Start: 2025-07-30 | End: 2025-07-30

## 2025-07-30 RX ORDER — GLUCAGON 3 MG/1
1 POWDER NASAL ONCE
Refills: 0 | Status: DISCONTINUED | OUTPATIENT
Start: 2025-07-30 | End: 2025-07-30

## 2025-07-30 RX ORDER — HALOPERIDOL 10 MG/1
2.5 TABLET ORAL ONCE
Refills: 0 | Status: COMPLETED | OUTPATIENT
Start: 2025-07-30 | End: 2025-07-30

## 2025-07-30 RX ORDER — OXYBUTYNIN CHLORIDE 5 MG/1
5 TABLET, FILM COATED, EXTENDED RELEASE ORAL DAILY
Refills: 0 | Status: DISCONTINUED | OUTPATIENT
Start: 2025-07-30 | End: 2025-07-30

## 2025-07-30 RX ORDER — METOPROLOL SUCCINATE 50 MG/1
0.5 TABLET, EXTENDED RELEASE ORAL
Refills: 0 | DISCHARGE

## 2025-07-30 RX ORDER — ASPIRIN 325 MG
81 TABLET ORAL DAILY
Refills: 0 | Status: DISCONTINUED | OUTPATIENT
Start: 2025-07-30 | End: 2025-07-30

## 2025-07-30 RX ORDER — BACITRACIN 500 UNIT/G
1 OINTMENT (GRAM) TOPICAL
Refills: 0 | Status: DISCONTINUED | OUTPATIENT
Start: 2025-07-30 | End: 2025-07-30

## 2025-07-30 RX ORDER — DEXTROSE 50 % IN WATER 50 %
15 SYRINGE (ML) INTRAVENOUS ONCE
Refills: 0 | Status: DISCONTINUED | OUTPATIENT
Start: 2025-07-30 | End: 2025-07-30

## 2025-07-30 RX ORDER — SODIUM CHLORIDE 9 G/1000ML
1000 INJECTION, SOLUTION INTRAVENOUS
Refills: 0 | Status: DISCONTINUED | OUTPATIENT
Start: 2025-07-30 | End: 2025-07-30

## 2025-07-30 RX ORDER — APIXABAN 5 MG/1
5 TABLET, FILM COATED ORAL EVERY 12 HOURS
Refills: 0 | Status: DISCONTINUED | OUTPATIENT
Start: 2025-07-30 | End: 2025-07-30

## 2025-07-30 RX ORDER — FENOFIBRATE 160 MG/1
1 TABLET ORAL
Refills: 0 | DISCHARGE

## 2025-07-30 RX ORDER — AMPICILLIN SODIUM AND SULBACTAM SODIUM 1; .5 G/1; G/1
1.5 INJECTION, POWDER, FOR SOLUTION INTRAMUSCULAR; INTRAVENOUS EVERY 6 HOURS
Refills: 0 | Status: DISCONTINUED | OUTPATIENT
Start: 2025-07-30 | End: 2025-07-30

## 2025-07-30 RX ORDER — FUROSEMIDE 10 MG/ML
20 INJECTION INTRAMUSCULAR; INTRAVENOUS DAILY
Refills: 0 | Status: DISCONTINUED | OUTPATIENT
Start: 2025-07-30 | End: 2025-07-30

## 2025-07-30 RX ORDER — BACITRACIN 500 UNIT/G
1 OINTMENT (GRAM) TOPICAL
Qty: 1 | Refills: 0
Start: 2025-07-30 | End: 2025-08-12

## 2025-07-30 RX ORDER — FUROSEMIDE 10 MG/ML
1 INJECTION INTRAMUSCULAR; INTRAVENOUS
Refills: 0 | DISCHARGE

## 2025-07-30 RX ORDER — DONEPEZIL HYDROCHLORIDE 5 MG/1
5 TABLET ORAL AT BEDTIME
Refills: 0 | Status: DISCONTINUED | OUTPATIENT
Start: 2025-07-30 | End: 2025-07-30

## 2025-07-30 RX ORDER — ATORVASTATIN CALCIUM 80 MG/1
80 TABLET, FILM COATED ORAL AT BEDTIME
Refills: 0 | Status: DISCONTINUED | OUTPATIENT
Start: 2025-07-30 | End: 2025-07-30

## 2025-07-30 RX ORDER — CEPHALEXIN 250 MG/1
500 CAPSULE ORAL EVERY 12 HOURS
Refills: 0 | Status: DISCONTINUED | OUTPATIENT
Start: 2025-07-30 | End: 2025-07-30

## 2025-07-30 RX ADMIN — OXYBUTYNIN CHLORIDE 10 MILLIGRAM(S): 5 TABLET, FILM COATED, EXTENDED RELEASE ORAL at 12:30

## 2025-07-30 RX ADMIN — METOPROLOL SUCCINATE 12.5 MILLIGRAM(S): 50 TABLET, EXTENDED RELEASE ORAL at 05:16

## 2025-07-30 RX ADMIN — HALOPERIDOL 2.5 MILLIGRAM(S): 10 TABLET ORAL at 05:02

## 2025-07-30 RX ADMIN — ESCITALOPRAM OXALATE 5 MILLIGRAM(S): 20 TABLET ORAL at 12:30

## 2025-07-30 RX ADMIN — SODIUM CHLORIDE 60 MILLILITER(S): 9 INJECTION, SOLUTION INTRAVENOUS at 09:00

## 2025-07-30 RX ADMIN — INSULIN LISPRO 1: 100 INJECTION, SOLUTION INTRAVENOUS; SUBCUTANEOUS at 12:31

## 2025-07-30 RX ADMIN — Medication 81 MILLIGRAM(S): at 12:30

## 2025-07-30 RX ADMIN — AMPICILLIN SODIUM AND SULBACTAM SODIUM 100 GRAM(S): 1; .5 INJECTION, POWDER, FOR SOLUTION INTRAMUSCULAR; INTRAVENOUS at 12:30

## 2025-07-30 RX ADMIN — FUROSEMIDE 20 MILLIGRAM(S): 10 INJECTION INTRAMUSCULAR; INTRAVENOUS at 05:16

## 2025-07-30 RX ADMIN — APIXABAN 5 MILLIGRAM(S): 5 TABLET, FILM COATED ORAL at 05:15

## 2025-08-03 LAB
CULTURE RESULTS: SIGNIFICANT CHANGE UP
CULTURE RESULTS: SIGNIFICANT CHANGE UP
SPECIMEN SOURCE: SIGNIFICANT CHANGE UP
SPECIMEN SOURCE: SIGNIFICANT CHANGE UP

## 2025-08-06 ENCOUNTER — APPOINTMENT (OUTPATIENT)
Dept: PULMONOLOGY | Facility: CLINIC | Age: 80
End: 2025-08-06
Payer: MEDICARE

## 2025-08-06 VITALS
DIASTOLIC BLOOD PRESSURE: 69 MMHG | HEART RATE: 100 BPM | WEIGHT: 190 LBS | TEMPERATURE: 98.1 F | HEIGHT: 64 IN | BODY MASS INDEX: 32.44 KG/M2 | SYSTOLIC BLOOD PRESSURE: 137 MMHG | OXYGEN SATURATION: 94 %

## 2025-08-06 DIAGNOSIS — J84.9 INTERSTITIAL PULMONARY DISEASE, UNSPECIFIED: ICD-10-CM

## 2025-08-06 DIAGNOSIS — R09.89 OTHER SPECIFIED SYMPTOMS AND SIGNS INVOLVING THE CIRCULATORY AND RESPIRATORY SYSTEMS: ICD-10-CM

## 2025-08-06 PROCEDURE — G2211 COMPLEX E/M VISIT ADD ON: CPT

## 2025-08-06 PROCEDURE — 99214 OFFICE O/P EST MOD 30 MIN: CPT

## 2025-08-06 RX ORDER — PANTOPRAZOLE 40 MG/1
40 TABLET, DELAYED RELEASE ORAL
Qty: 30 | Refills: 5 | Status: ACTIVE | COMMUNITY
Start: 2025-08-06 | End: 1900-01-01

## (undated) DEVICE — DRAPE SPLIT SHEET 77" X 108"

## (undated) DEVICE — GLV 6.5 PROTEXIS (WHITE)

## (undated) DEVICE — D HELP - CLEARVIEW CLEARIFY SYSTEM

## (undated) DEVICE — DRSG OPSITE 2.5 X 2"

## (undated) DEVICE — DRAPE MAYO STAND 23"

## (undated) DEVICE — TUBING STRYKER PNEUMOSURE HI FLOW RTP

## (undated) DEVICE — SUT POLYSORB 2-0 30" V-20 UNDYED

## (undated) DEVICE — GLV 8.5 PROTEXIS (BLUE)

## (undated) DEVICE — SPECIMEN CONTAINER 100ML

## (undated) DEVICE — DRAPE INSTRUMENT POUCH 6.75" X 11"

## (undated) DEVICE — SHOE  POSTOP SOFTIE DARCO M-SM

## (undated) DEVICE — DRSG STERISTRIPS 0.5 X 4"

## (undated) DEVICE — PACK ADVANCED LAPAROSCOPIC NS

## (undated) DEVICE — WARMING BLANKET UPPER ADULT

## (undated) DEVICE — POSITIONER FOAM EGG CRATE ULNAR 2PCS (PINK)

## (undated) DEVICE — DRSG STOCKINETTE TUBULAR COTTON 1PLY 6X72"

## (undated) DEVICE — BUR ARTHREX STR 8X2MM

## (undated) DEVICE — MEDICATION LABELS W MARKER

## (undated) DEVICE — GOWN TRIMAX LG

## (undated) DEVICE — SUT POLYSORB 3-0 18" P-12 UNDYED

## (undated) DEVICE — PACK MINOR

## (undated) DEVICE — ENDOCATCH 10MM SPECIMEN POUCH

## (undated) DEVICE — GLV 8.5 PROTEXIS (WHITE)

## (undated) DEVICE — DRAPE TOWEL BLUE 17" X 24"

## (undated) DEVICE — SAW BLADE STRYKER MED NARROW 18X5.5MM

## (undated) DEVICE — SHOE  POSTOP SOFTIE DARCO M-M

## (undated) DEVICE — TOURNIQUET ESMARK 4"

## (undated) DEVICE — DRAPE 1/2 SHEET 40X57"

## (undated) DEVICE — SHEARS COVIDIEN ENDO SHEAR 5MM X 31CM W UNIPOLAR CAUTERY

## (undated) DEVICE — BUR STRYKER EGG 4MM

## (undated) DEVICE — VENODYNE/SCD SLEEVE CALF MEDIUM

## (undated) DEVICE — APPLICATOR FOR ARISTA XL 38CM

## (undated) DEVICE — DRSG KLING 3"

## (undated) DEVICE — TROCAR APPLIED MEDICAL KII BALLOON BLUNT TIP 12MM X 100MM

## (undated) DEVICE — DRAPE C ARM UNIVERSAL

## (undated) DEVICE — PREP BETADINE 5% STERILE OPTHALMIC SOLUTION

## (undated) DEVICE — BLADE SCALPEL SAFETYLOCK #15

## (undated) DEVICE — SUT BIOSYN 4-0 18" P-12

## (undated) DEVICE — SOL IRR POUR NS 0.9% 500ML

## (undated) DEVICE — SYR LUER LOK 20CC

## (undated) DEVICE — VENODYNE/SCD SLEEVE CALF LARGE

## (undated) DEVICE — DISSECTOR ENDO PEANUT 5MM

## (undated) DEVICE — SOL IRR POUR H2O 250ML

## (undated) DEVICE — DRSG XEROFORM 1 X 8"

## (undated) DEVICE — GLV 8 PROTEXIS (WHITE)

## (undated) DEVICE — SOL IRR POUR H2O 500ML

## (undated) DEVICE — DRSG ACE BANDAGE 3"

## (undated) DEVICE — SAW BLADE STRYKER MED AGGRESSIVE 9X25X0.38MM

## (undated) DEVICE — TOURNIQUET CUFF 18" DUAL PORT DUAL BLADDER W PLC (BLACK)

## (undated) DEVICE — GLV 7 PROTEXIS (WHITE)

## (undated) DEVICE — SUT MONOCRYL 5-0 18" P-3 UNDYED

## (undated) DEVICE — GLV 7.5 PROTEXIS (WHITE)

## (undated) DEVICE — SUT MONOSOF 4-0 18" C-13

## (undated) DEVICE — SUT POLYSORB 0 36" GU-46

## (undated) DEVICE — SHOE  POSTOP SOFTIE DARCO M-LG

## (undated) DEVICE — DRSG COBAN 4"

## (undated) DEVICE — DRAIN CHANNEL 19FR ROUND FULL FLUTED

## (undated) DEVICE — TUBING PLUME AWAY 4.0

## (undated) DEVICE — SUT PDS II 0 18" ENDOLOOP LIGATURE

## (undated) DEVICE — DRAIN RESERVOIR FOR JACKSON PRATT 100CC CARDINAL

## (undated) DEVICE — TROCAR COVIDIEN VERSAPORT BLADELESS OPTICAL 5MM STANDARD